# Patient Record
Sex: FEMALE | Race: WHITE | NOT HISPANIC OR LATINO | Employment: OTHER | URBAN - METROPOLITAN AREA
[De-identification: names, ages, dates, MRNs, and addresses within clinical notes are randomized per-mention and may not be internally consistent; named-entity substitution may affect disease eponyms.]

---

## 2017-02-22 ENCOUNTER — TELEPHONE (OUTPATIENT)
Dept: FAMILY MEDICINE | Facility: CLINIC | Age: 57
End: 2017-02-22

## 2017-02-22 NOTE — TELEPHONE ENCOUNTER
S-(situation): Temp at 101. Taking ibuprofen. Ears hurt, Head stuffed, achy all over. Sx started yesterday. Somewhat of a sore throat. Main sx is hacking cough. No shortness of breath or chest pain.     B-(background): She had to fly yesterday , had cold sx before and then felt worse after trip. Did have flu shot this year.   No chronic lung or immune system problems  Has not travelled outside the country in the last 3 weeks.   No one else in her household sick.     A-(assessment): likely viral and self limiting , possible flu    R-(recommendations): Option given to pt of UC this evening If this is flu starting Tamiflu early may be of help. However home treatment not unreasonable. Rest, fluids, tea with honey, tylenol or ibuprofen, guiafenesin.    If symptoms worsen or change to call back. If after hours to UC/ER.  Emperatriz Fernandez RN

## 2017-03-03 ENCOUNTER — TELEPHONE (OUTPATIENT)
Dept: FAMILY MEDICINE | Facility: CLINIC | Age: 57
End: 2017-03-03

## 2017-03-03 NOTE — TELEPHONE ENCOUNTER
Unavailable to leave voicemail, its full. Will send [employer] message to complete the Questionaire. Pt is active as stated on chart. Thanks.    Maria D Clay MA

## 2017-03-23 ENCOUNTER — OFFICE VISIT (OUTPATIENT)
Dept: FAMILY MEDICINE | Facility: CLINIC | Age: 57
End: 2017-03-23
Payer: COMMERCIAL

## 2017-03-23 VITALS
DIASTOLIC BLOOD PRESSURE: 72 MMHG | HEART RATE: 79 BPM | TEMPERATURE: 98.8 F | WEIGHT: 192 LBS | SYSTOLIC BLOOD PRESSURE: 114 MMHG | BODY MASS INDEX: 32.78 KG/M2 | OXYGEN SATURATION: 99 % | HEIGHT: 64 IN

## 2017-03-23 DIAGNOSIS — R10.13 ABDOMINAL PAIN, EPIGASTRIC: Primary | ICD-10-CM

## 2017-03-23 DIAGNOSIS — R00.2 PALPITATIONS: ICD-10-CM

## 2017-03-23 PROCEDURE — 99214 OFFICE O/P EST MOD 30 MIN: CPT | Performed by: FAMILY MEDICINE

## 2017-03-23 PROCEDURE — 93000 ELECTROCARDIOGRAM COMPLETE: CPT | Performed by: FAMILY MEDICINE

## 2017-03-23 NOTE — NURSING NOTE
"Chief Complaint   Patient presents with     Abdominal Pain     upset stomach       Initial /72 (BP Location: Left arm, Patient Position: Chair, Cuff Size: Adult Large)  Pulse 79  Temp 98.8  F (37.1  C) (Oral)  Ht 5' 4\" (1.626 m)  Wt 192 lb (87.1 kg)  SpO2 99%  Breastfeeding? No  BMI 32.96 kg/m2 Estimated body mass index is 32.96 kg/(m^2) as calculated from the following:    Height as of this encounter: 5' 4\" (1.626 m).    Weight as of this encounter: 192 lb (87.1 kg).  Medication Reconciliation: complete     Maria D Clay MA      "

## 2017-03-23 NOTE — PATIENT INSTRUCTIONS
Try to cut down on coffee and other stomach triggers.      Drink more water.    Take the prescription omeprazole for a month.    If still having symptoms Follow-Up with me in a month.    If having any worsening symptoms (chest pain, shortness of breath, dizziness, palpitations, etc) let me know sooner.

## 2017-03-23 NOTE — MR AVS SNAPSHOT
After Visit Summary   3/23/2017    Gisela Pak    MRN: [id number]           Patient Information     Date Of Birth          1960        Visit Information        Provider Department      3/23/2017 3:40 PM Mel Beavers MD [facility]        Today's Diagnoses     Palpitations    -  1    Abdominal pain, epigastric          Care Instructions    Try to cut down on coffee and other stomach triggers.      Drink more water.    Take the prescription omeprazole for a month.    If still having symptoms Follow-Up with me in a month.    If having any worsening symptoms (chest pain, shortness of breath, dizziness, palpitations, etc) let me know sooner.        Follow-ups after your visit        Who to contact     If you have questions or need follow up information about today's clinic visit or your schedule please contact [facility] [place]note 2[facility] directly at [phone].  Normal or non-critical lab and imaging results will be communicated to you by MyChart, letter or phone within 4 business days after the clinic has received the results. If you do not hear from us within 7 days, please contact the clinic through [employer] or phone. If you have a critical or abnormal lab result, we will notify you by phone as soon as possible.  Submit refill requests through [employer] or call your pharmacy and they will forward the refill request to us. Please allow 3 business days for your refill to be completed.          Additional Information About Your Visit        MyChart Information     [employer] gives you secure access to your electronic health record. If you see a primary care provider, you can also send messages to your care team and make appointments. If you have questions, please call your primary care clinic.  If you do not have a primary care provider, please call [phone] and they will assist you.        Care EveryWhere ID     This is your Care EveryWhere ID. This could be used by other  "organizations to access your [facility] medical records  [id number]        Your Vitals Were     Pulse Temperature Height Pulse Oximetry Breastfeeding? BMI (Body Mass Index)    79 98.8  F (37.1  C) (Oral) 5' 4\" (1.626 m) 99% No 32.96 kg/m2       Blood Pressure from Last 3 Encounters:   03/23/17 114/72   11/23/16 112/76   07/07/16 131/74    Weight from Last 3 Encounters:   03/23/17 192 lb (87.1 kg)   07/07/16 180 lb (81.6 kg)   06/29/16 187 lb (84.8 kg)              We Performed the Following     EKG 12-lead complete w/read - Clinics          Today's Medication Changes          These changes are accurate as of: 3/23/17  5:15 PM.  If you have any questions, ask your nurse or doctor.               Start taking these medicines.        Dose/Directions    omeprazole 20 MG CR capsule   Commonly known as:  priLOSEC   Used for:  Abdominal pain, epigastric   Started by:  Mel Beavers MD        Dose:  20 mg   Take 1 capsule (20 mg) by mouth daily   Quantity:  30 capsule   Refills:  0            Where to get your medicines      These medications were sent to [facility] [place] [id number]  [address] [facility] [address]     Phone:  [phone]     omeprazole 20 MG CR capsule                Primary Care Provider Office Phone # Fax #    Mel Beavers [phone]       [place] [id number]  [facility] [address]        Thank you!     Thank you for choosing [facility]  for your care. Our goal is always to provide you with excellent care. Hearing back from our patients is one way we can continue to improve our services. Please take a few minutes to complete the written survey that you may receive in the mail after your visit with us. Thank you!             Your Updated Medication List - Protect others around you: Learn how to safely use, store and throw away your medicines at www.disposemymeds.org.          This list is accurate as " of: 3/23/17  5:15 PM.  Always use your most recent med list.                   Brand Name Dispense Instructions for use    Butalbital-Acetaminophen  MG Tabs per tablet    PHRENILIN    30 tablet    Take 1-2 tablets by mouth every 6 hours as needed for headaches       clonazePAM 0.5 MG tablet    klonoPIN    30 tablet    Take 0.5-1 tablets (0.25-0.5 mg) by mouth nightly as needed for anxiety       OMEGA-3 CF 1000 MG Caps      1 tab daily       omeprazole 20 MG CR capsule    priLOSEC    30 capsule    Take 1 capsule (20 mg) by mouth daily       VITAMIN D PO      Reported on 3/23/2017

## 2017-03-23 NOTE — PROGRESS NOTES
"  SUBJECTIVE:                                                    Gisela Pak is a 57 year old female who presents to clinic today for the following health issues:      Upset stomach      Duration: 1 month    Description (location/character/radiation): right in the middle of the stomach, radiates to jaw    Intensity:  moderate    Accompanying signs and symptoms: Radiate to jaw    History (similar episodes/previous evaluation): Years and years ago with stomach 30 years ago    Precipitating or alleviating factors: eating    Therapies tried and outcome: omeprazole one in the am and pm,   and pepto pink pill     Outcome: not helping.     Onset while out east visiting daughter in [place].  \"burning\" sensation in chest  Belching  Nausea  No acid brash  Left jaw pain  Worse after eating  Pepto helped after a while  Some RUQ pain, radiating to back  Took omeprazole 10 BID x 1 week - not much relief  Worse after coffee - drinks 3 cups of coffee/day  Overeating this winter and gaining weight.    Some fluttering in heart - lasts seconds.  Some dizziness x 3 months - timing not associated with palpitations.  Mild cough - recent URI.      Problem list and histories reviewed & adjusted, as indicated.  Additional history: as documented    Patient Active Problem List   Diagnosis     Disturbance of skin sensation     Tachycardia     Insomnia     Mixed urge and stress incontinence     Generalized anxiety disorder     HYPERLIPIDEMIA LDL GOAL <160     Classical migraine     Varicose veins of legs     Ganglion of tendon sheath     Lumbago     Hip pain, left     Past Surgical History:   Procedure Laterality Date     COLONOSCOPY  2/5/02    ZAK, Dr. Solomon     COLONOSCOPY  7/9/12    WNL, Dr. Sharma, rpt 10 yrs.     UPPER GI ENDOSCOPY  3/28/02    HH, gastritis, reflux esophagitis, neg bx, Dr. Solomon       Social History   Substance Use Topics     Smoking status: Never Smoker     Smokeless tobacco: Never Used      Comment: a little in " "college     Alcohol use No      Comment: very rare     Family History   Problem Relation Age of Onset     CEREBROVASCULAR DISEASE Maternal Grandmother      Colon Cancer Maternal Grandmother      CEREBROVASCULAR DISEASE Maternal Grandfather      VAMSHI. Paternal Grandfather      VAMSHI. Paternal Grandmother      Breast Cancer Paternal Grandmother      CEREBROVASCULAR DISEASE Mother      Eye Disorder Father      \"going blind\"     Coronary Artery Disease Father      CAB at 78     Breast Cancer Paternal Aunt      Breast Cancer Maternal Aunt      x3 aunts     Colon Cancer Maternal Uncle          BP Readings from Last 3 Encounters:   03/23/17 114/72   11/23/16 112/76   07/07/16 131/74    Wt Readings from Last 3 Encounters:   03/23/17 192 lb (87.1 kg)   07/07/16 180 lb (81.6 kg)   06/29/16 187 lb (84.8 kg)          Reviewed and updated as needed this visit by clinical staff  Tobacco  Allergies  Meds  Problems  Med Hx  Surg Hx  Fam Hx  Soc Hx        Reviewed and updated as needed this visit by Provider  Allergies  Meds  Problems         ROS:  RESP: POSITIVE for some shortness of breath while recently resuming exercise  GI: NEGATIVE for vomiting, POSITIVE for constipation    OBJECTIVE:                                                    /72 (BP Location: Left arm, Patient Position: Chair, Cuff Size: Adult Large)  Pulse 79  Temp 98.8  F (37.1  C) (Oral)  Ht 5' 4\" (1.626 m)  Wt 192 lb (87.1 kg)  SpO2 99%  Breastfeeding? No  BMI 32.96 kg/m2  Body mass index is 32.96 kg/(m^2).  GEN:  no apparent distress  EYES: PERRL, conjunctivae and sclerae clear  NECK:  Supple without adenopathy, mass, or thyromegaly  LUNGS:  normal respiratory effort, and lungs clear to auscultation bilaterally - no rales, rhonchi or wheezes  CV: regular rate and rhythm, normal S1 S2, no S3 or S4 and no murmur, click or rub  ABD:  soft, mild epigastric tenderness, no mass, no hepatosplenomegaly, no hernias   SKIN:  normal to inspection " and palpation, no rashes or abnormal-appearing lesions     Diagnostic Test Results:  EKG: Normal Sinus Rhythm, normal axis, no acute ST/T changes c/w ischemia, no LVH by voltage criteria, Right Bundle Branch Block, unchanged from previous tracing by my interpretation      ASSESSMENT/PLAN:                                                        1. Abdominal pain, epigastric  Most likely GI etiology given constellation of symptoms and history of HH/esophagitis.  We'll treat empirically with omeprazole for 1 month.  I also discussed lifestyle changes to minimize dyspepsia/GERD.  Discussed that if symptoms persist further diagnostic workup may include EGD or possible RUQ ultrasound.    - omeprazole (PRILOSEC) 20 MG CR capsule; Take 1 capsule (20 mg) by mouth daily  Dispense: 30 capsule; Refill: 0    2. Palpitations  Also left jaw pain.  EKG is unchanged.  Consider further workup.    - EKG 12-lead complete w/read - Clinics     FUTURE APPOINTMENTS:       - Follow-up visit in 1 month      Patient Instructions   Try to cut down on coffee and other stomach triggers.      Drink more water.    Take the prescription omeprazole for a month.    If still having symptoms Follow-Up with me in a month.    If having any worsening symptoms (chest pain, shortness of breath, dizziness, palpitations, etc) let me know sooner.      Mel Beavers MD  [facility]

## 2017-05-18 DIAGNOSIS — F51.01 PRIMARY INSOMNIA: ICD-10-CM

## 2017-05-18 DIAGNOSIS — F41.1 GENERALIZED ANXIETY DISORDER: ICD-10-CM

## 2017-05-18 DIAGNOSIS — G43.109 MIGRAINE WITH AURA AND WITHOUT STATUS MIGRAINOSUS, NOT INTRACTABLE: ICD-10-CM

## 2017-05-18 RX ORDER — BUTALBITAL/ACETAMINOPHEN 50MG-325MG
TABLET ORAL
Qty: 30 TABLET | Refills: 0 | Status: CANCELLED | OUTPATIENT
Start: 2017-05-18

## 2017-05-18 NOTE — TELEPHONE ENCOUNTER
clonazePAM (KLONOPIN) 0.5 MG tablet      Last Written Prescription Date:  11/23/16  Last Fill Quantity: 30,   # refills: 0  Last Office Visit with [facility] or [facility] prescribing provider: 3/23/17  Future Office visit:       Butalbital-Acetaminophen (PHRENILIN)  MG TABS      Last Written Prescription Date:  12/14/15  Last Fill Quantity: 30,   # refills: 0  Last Office Visit with [facility] or [facility] prescribing provider: 3/23/17  Future Office visit:       Routing refill request to provider for review/approval because:  Drug not on the [facility], [facility] or [facility] refill protocol or controlled substance

## 2017-05-19 NOTE — TELEPHONE ENCOUNTER
Routing to reception pool -- please reach out to patient to schedule OV to address these refills.    Thank you  KEVIN GoncalvesN, RN  [facility]

## 2017-05-24 ENCOUNTER — TELEPHONE (OUTPATIENT)
Dept: FAMILY MEDICINE | Facility: CLINIC | Age: 57
End: 2017-05-24

## 2017-05-24 DIAGNOSIS — G43.109 MIGRAINE WITH AURA AND WITHOUT STATUS MIGRAINOSUS, NOT INTRACTABLE: ICD-10-CM

## 2017-05-24 RX ORDER — BUTALBITAL/ACETAMINOPHEN 50MG-325MG
1-2 TABLET ORAL EVERY 6 HOURS PRN
Qty: 30 TABLET | Refills: 0 | Status: SHIPPED | OUTPATIENT
Start: 2017-05-24 | End: 2017-12-01

## 2017-05-24 NOTE — TELEPHONE ENCOUNTER
The patient is upset that she is required to come in for an office visit to get a refill of Butalbital-Acetaminophen (PHRENILIN)  MG TABS and clonazePAM (KLONOPIN) 0.5 MG tablet.  The patient stated that she did have an office visit in 3/2017 and doesn't feel it appropriate that she needs to come in again.  Please follow up with the patient.

## 2017-05-24 NOTE — TELEPHONE ENCOUNTER
--Please see previous msg from patient in this encounter.  --Can I call patient and confirm that it is [facility] policy for face to face visits for controlled meds?  --I do not find plan or CSA.  --There is a 5/18/17 encounter that is still open with medication pended.    Hanna Valencia, KEVINN, RN

## 2017-05-24 NOTE — TELEPHONE ENCOUNTER
Yes, she's been informed of this before regarding clonazepam.  I will fill the butalbital as I had previously discussed with her.  Please fax to her pharmacy.  Script is in [facility].   I will do a CSA when I see her.   Mel Beavers MD.      11/10/2016 refill encounter for clonazepam:  Conversation: Refill Request   (Newest Message First)   Maria Antonia De La Rosa, EBEN        11/10/16 4:55 PM   Note      Patient called back and was informed of need for office visit per clinic policy regarding controlled substances.     Patient verbalized understanding and stated she will think about an office visit as she uses this medication rarely for migraines.     LAURA Nation, RN                11/23/2016 office visit for clonazepam:    ASSESSMENT/PLAN:               1. Primary insomnia  2. Generalized anxiety disorder  Improved control.  Uses klonopin sparingly - no more than #30 over 6 months.  Understands that she must be seen in clinic for refills.    - clonazePAM (KLONOPIN) 0.5 MG tablet; Take 0.5-1 tablets (0.25-0.5 mg) by mouth nightly as needed for anxiety  Dispense: 30 tablet; Refill: 0     3. Migraine with aura and without status migrainosus, not intractable  stable/well controlled.  Control has improved significantly since menopause.  Occasionally uses butalbital - still has about 1/3 of #30 supply she received a year ago.  Discussed that with such infrequent use I am ok with refilling prn by phone request.

## 2017-05-30 RX ORDER — CLONAZEPAM 0.5 MG/1
TABLET ORAL
Qty: 30 TABLET | Refills: 0 | OUTPATIENT
Start: 2017-05-30

## 2017-06-04 NOTE — PROGRESS NOTES
SUBJECTIVE:                                                    Gisela Pak is a 57 year old female who presents to clinic today for the following health issues:       Anxiety Follow-Up    Status since last visit: No change    Other associated symptoms:None    Complicating factors:   Significant life event: No   Current substance abuse: None  Depression symptoms: Yes-  sometimes    Uses clonazepam sparingly - #30 last about 6 months.    KIRSTIN-7 SCORE 6/29/2016 11/23/2016 6/5/2017   Total Score - - -   Total Score 15 6 8     PHQ-9 SCORE 6/29/2016 11/23/2016 6/5/2017   Total Score - - -   Total Score 13 3 7         GAD7     Burning tongue  x a year  Just the tongue - not whole mouth.  Mild-moderate - mostly just a nuisance.  Has not mentioned to dentist.  No dental issues.  No associated dry mouth.  Sister has oral lichen planus        Amount of exercise or physical activity: 2-3 days/week for an average of 30-45 minutes    Problems taking medications regularly: No    Medication side effects: none    Diet: regular (no restrictions)        Problem list and histories reviewed & adjusted, as indicated.  Additional history: as documented    Patient Active Problem List   Diagnosis     Disturbance of skin sensation     Tachycardia     Insomnia     Mixed urge and stress incontinence     Generalized anxiety disorder     HYPERLIPIDEMIA LDL GOAL <160     Classical migraine     Varicose veins of legs     Ganglion of tendon sheath     Lumbago     Hip pain, left     Past Surgical History:   Procedure Laterality Date     COLONOSCOPY  2/5/02    ZAK, Dr. Solomon     COLONOSCOPY  7/9/12    WNL, Dr. Sharma, rpt 10 yrs.     UPPER GI ENDOSCOPY  3/28/02    HH, gastritis, reflux esophagitis, neg bx, Dr. Solomon       Social History   Substance Use Topics     Smoking status: Never Smoker     Smokeless tobacco: Never Used      Comment: a little in college     Alcohol use No      Comment: very rare     Family History   Problem Relation  "Age of Onset     CEREBROVASCULAR DISEASE Maternal Grandmother      Colon Cancer Maternal Grandmother      CEREBROVASCULAR DISEASE Maternal Grandfather      C.A.D. Paternal Grandfather      C.A.D. Paternal Grandmother      Breast Cancer Paternal Grandmother      CEREBROVASCULAR DISEASE Mother      Other - See Comments Mother 80     churg adia syndrome     Eye Disorder Father      \"going blind\"     Coronary Artery Disease Father      CAB at 78     Breast Cancer Paternal Aunt      Breast Cancer Maternal Aunt      x3 aunts     Colon Cancer Maternal Uncle          Current Outpatient Prescriptions   Medication Sig Dispense Refill     clonazePAM (KLONOPIN) 0.5 MG tablet Take 0.5-1 tablets (0.25-0.5 mg) by mouth nightly as needed for anxiety 30 tablet 0     Butalbital-Acetaminophen (PHRENILIN)  MG TABS per tablet Take 1-2 tablets by mouth every 6 hours as needed for headaches 30 tablet 0     omeprazole (PRILOSEC) 20 MG CR capsule Take 1 capsule (20 mg) by mouth daily 30 capsule 0     VITAMIN D OR Reported on 3/23/2017       OMEGA-3 CF 1000 MG OR CAPS 1 tab daily  0     [DISCONTINUED] clonazePAM (KLONOPIN) 0.5 MG tablet Take 0.5-1 tablets (0.25-0.5 mg) by mouth nightly as needed for anxiety 30 tablet 0     Allergies   Allergen Reactions     Lexapro [Escitalopram] Palpitations and Nausea     Recent Labs   Lab Test  07/07/16   0903  06/06/16   0811   03/05/12   0838  01/17/11   0953  07/30/09   0944   LDL   --   125*   --   167*  159*  133*   HDL   --   78   --   70  64  52   TRIG   --   96   --   78  117  167*   ALT   --   32   --    --    --   21   CR  0.75  0.82   < >  0.87  0.89  0.82   GFRESTIMATED  81  72   < >  69  67  74   GFRESTBLACK  >90   GFR Calc    87   < >  83  82  >90   POTASSIUM  3.9  3.9   < >  4.3  4.3  4.6    < > = values in this interval not displayed.      BP Readings from Last 3 Encounters:   06/05/17 (!) 130/25   03/23/17 114/72   11/23/16 112/76    Wt Readings from Last 3 " "Encounters:   06/05/17 194 lb 12 oz (88.3 kg)   03/23/17 192 lb (87.1 kg)   07/07/16 180 lb (81.6 kg)         Reviewed and updated as needed this visit by clinical staff  Tobacco  Allergies  Med Hx  Surg Hx  Fam Hx  Soc Hx        ROS:  NEURO: POSITIVE for numbness/tingling of hands and feet  PSYCH: POSITIVE for irritability    OBJECTIVE:                                                    BP (!) 130/25 (BP Location: Left arm, Patient Position: Chair, Cuff Size: Adult Large)  Pulse 87  Temp 98.7  F (37.1  C) (Oral)  Resp 16  Ht 5' 4\" (1.626 m)  Wt 194 lb 12 oz (88.3 kg)  SpO2 98%  Breastfeeding? No  BMI 33.43 kg/m2  Body mass index is 33.43 kg/(m^2).  GEN:  no apparent distress  ENT: oral cavity appears normal with no lesions, tongue appears normal, moist mucus membranes and pharynx with normal landmarks  PSYCH:    Appearance: appropriately and casually dressed    Attitude: cooperative    Speech:  normal rate, rhythm, and tone    Thought Form: organized    Thought Content: no evidence of psychotic thought    Mood: anxious    Affect: mood congruent    Insight: good        ASSESSMENT/PLAN:                                                        1. Primary insomnia  2. Generalized anxiety disorder  Reviewed that clonazepam is a benzo which is a controlled substance.  Reviewed clinic's refill policy regarding controlled substances - must be refilled in person at face-to-face visits.  We did do a formal controlled substance agreement today.  Discussed that I am not concerned about her minimal use of this.  I do have concerns about uncontrolled depression and she is considering a trial of antidepressant.    - clonazePAM (KLONOPIN) 0.5 MG tablet; Take 0.5-1 tablets (0.25-0.5 mg) by mouth nightly as needed for anxiety  Dispense: 30 tablet; Refill: 0    3. Burning tongue  Will start workup (which also includes workup for peripheral neuropathy).  Recommended she see ENT if labs all normal and symptoms persist.    - " Vitamin B12  - Iron and iron binding capacity  - Folate  - Zinc  - Vitamin B6  - TSH with free T4 reflex  - Basic metabolic panel  - OTOLARYNGOLOGY REFERRAL       Mel Beavers MD  [facility]

## 2017-06-05 ENCOUNTER — OFFICE VISIT (OUTPATIENT)
Dept: FAMILY MEDICINE | Facility: CLINIC | Age: 57
End: 2017-06-05
Payer: COMMERCIAL

## 2017-06-05 VITALS
TEMPERATURE: 98.7 F | RESPIRATION RATE: 16 BRPM | WEIGHT: 194.75 LBS | DIASTOLIC BLOOD PRESSURE: 25 MMHG | OXYGEN SATURATION: 98 % | SYSTOLIC BLOOD PRESSURE: 130 MMHG | BODY MASS INDEX: 33.25 KG/M2 | HEART RATE: 87 BPM | HEIGHT: 64 IN

## 2017-06-05 DIAGNOSIS — K14.6 BURNING TONGUE: ICD-10-CM

## 2017-06-05 DIAGNOSIS — F41.1 GENERALIZED ANXIETY DISORDER: ICD-10-CM

## 2017-06-05 DIAGNOSIS — F51.01 PRIMARY INSOMNIA: Primary | ICD-10-CM

## 2017-06-05 PROCEDURE — 84207 ASSAY OF VITAMIN B-6: CPT | Mod: 90 | Performed by: FAMILY MEDICINE

## 2017-06-05 PROCEDURE — 84443 ASSAY THYROID STIM HORMONE: CPT | Performed by: FAMILY MEDICINE

## 2017-06-05 PROCEDURE — 82746 ASSAY OF FOLIC ACID SERUM: CPT | Performed by: FAMILY MEDICINE

## 2017-06-05 PROCEDURE — 80048 BASIC METABOLIC PNL TOTAL CA: CPT | Performed by: FAMILY MEDICINE

## 2017-06-05 PROCEDURE — 83540 ASSAY OF IRON: CPT | Performed by: FAMILY MEDICINE

## 2017-06-05 PROCEDURE — 84630 ASSAY OF ZINC: CPT | Mod: 90 | Performed by: FAMILY MEDICINE

## 2017-06-05 PROCEDURE — 99000 SPECIMEN HANDLING OFFICE-LAB: CPT | Performed by: FAMILY MEDICINE

## 2017-06-05 PROCEDURE — 82607 VITAMIN B-12: CPT | Performed by: FAMILY MEDICINE

## 2017-06-05 PROCEDURE — 36415 COLL VENOUS BLD VENIPUNCTURE: CPT | Performed by: FAMILY MEDICINE

## 2017-06-05 PROCEDURE — 99214 OFFICE O/P EST MOD 30 MIN: CPT | Performed by: FAMILY MEDICINE

## 2017-06-05 PROCEDURE — 83550 IRON BINDING TEST: CPT | Performed by: FAMILY MEDICINE

## 2017-06-05 RX ORDER — CLONAZEPAM 0.5 MG/1
0.25-0.5 TABLET ORAL
Qty: 30 TABLET | Refills: 0 | Status: SHIPPED | OUTPATIENT
Start: 2017-06-05 | End: 2018-01-15

## 2017-06-05 ASSESSMENT — ANXIETY QUESTIONNAIRES
GAD7 TOTAL SCORE: 8
6. BECOMING EASILY ANNOYED OR IRRITABLE: MORE THAN HALF THE DAYS
2. NOT BEING ABLE TO STOP OR CONTROL WORRYING: SEVERAL DAYS
7. FEELING AFRAID AS IF SOMETHING AWFUL MIGHT HAPPEN: MORE THAN HALF THE DAYS
5. BEING SO RESTLESS THAT IT IS HARD TO SIT STILL: NOT AT ALL
3. WORRYING TOO MUCH ABOUT DIFFERENT THINGS: SEVERAL DAYS
1. FEELING NERVOUS, ANXIOUS, OR ON EDGE: SEVERAL DAYS

## 2017-06-05 ASSESSMENT — PATIENT HEALTH QUESTIONNAIRE - PHQ9: 5. POOR APPETITE OR OVEREATING: SEVERAL DAYS

## 2017-06-05 NOTE — MR AVS SNAPSHOT
After Visit Summary   6/5/2017    Gisela Pak    MRN: [id number]           Patient Information     Date Of Birth          1960        Visit Information        Provider Department      6/5/2017 5:20 PM Mel Beavers MD [facility]        Today's Diagnoses     Burning tongue    -  1    Primary insomnia        Generalized anxiety disorder           Follow-ups after your visit        Additional Services     OTOLARYNGOLOGY REFERRAL       Your provider has referred you to:   [facility]: [facility] ([facility] - [place] [phone]    [facility]: [facility] [phone]       Please be aware that coverage of these services is subject to the terms and limitations of your health insurance plan.  Call member services at your health plan with any benefit or coverage questions.      Please bring the following with you to your appointment:    (1) Any X-Rays, CTs or MRIs which have been performed.  Contact the facility where they were done to arrange for  prior to your scheduled appointment.   (2) List of current medications  (3) This referral request   (4) Any documents/labs given to you for this referral                  Who to contact     If you have questions or need follow up information about today's clinic visit or your schedule please contact [facility] directly at [phone].  Normal or non-critical lab and imaging results will be communicated to you by MyChart, letter or phone within 4 business days after the clinic has received the results. If you do not hear from us within 7 days, please contact the clinic through MyChart or phone. If you have a critical or abnormal lab result, we will notify you by phone as soon as possible.  Submit refill requests through Dealentra or call your pharmacy and they will forward the refill request to us. Please allow 3 business days for your refill to  "be completed.          Additional Information About Your Visit        ESTmobhart Information     NileGuide gives you secure access to your electronic health record. If you see a primary care provider, you can also send messages to your care team and make appointments. If you have questions, please call your primary care clinic.  If you do not have a primary care provider, please call [phone] and they will assist you.        Care EveryWhere ID     This is your Care EveryWhere ID. This could be used by other organizations to access your [place] medical records  [id number]        Your Vitals Were     Pulse Temperature Respirations Height Pulse Oximetry Breastfeeding?    87 98.7  F (37.1  C) (Oral) 16 5' 4\" (1.626 m) 98% No    BMI (Body Mass Index)                   33.43 kg/m2            Blood Pressure from Last 3 Encounters:   06/05/17 (!) 130/25   03/23/17 114/72   11/23/16 112/76    Weight from Last 3 Encounters:   06/05/17 194 lb 12 oz (88.3 kg)   03/23/17 192 lb (87.1 kg)   07/07/16 180 lb (81.6 kg)              We Performed the Following     Basic metabolic panel     Folate     Iron and iron binding capacity     OTOLARYNGOLOGY REFERRAL     TSH with free T4 reflex     Vitamin B12     Vitamin B6     Zinc          Where to get your medicines      Some of these will need a paper prescription and others can be bought over the counter.  Ask your nurse if you have questions.     Bring a paper prescription for each of these medications     clonazePAM 0.5 MG tablet          Primary Care Provider Office Phone # Fax #    Mel Beavers [phone]       [facility] [address] [facility] [address]        Thank you!     Thank you for choosing [facility]  for your care. Our goal is always to provide you with excellent care. Hearing back from our patients is one way we can continue to improve our services. Please take a few minutes to complete the written survey that you " may receive in the mail after your visit with us. Thank you!             Your Updated Medication List - Protect others around you: Learn how to safely use, store and throw away your medicines at www.disposemymeds.org.          This list is accurate as of: 6/5/17  6:12 PM.  Always use your most recent med list.                   Brand Name Dispense Instructions for use    Butalbital-Acetaminophen  MG Tabs per tablet    PHRENILIN    30 tablet    Take 1-2 tablets by mouth every 6 hours as needed for headaches       clonazePAM 0.5 MG tablet    klonoPIN    30 tablet    Take 0.5-1 tablets (0.25-0.5 mg) by mouth nightly as needed for anxiety       OMEGA-3 CF 1000 MG Caps      1 tab daily       omeprazole 20 MG CR capsule    priLOSEC    30 capsule    Take 1 capsule (20 mg) by mouth daily       VITAMIN D PO      Reported on 3/23/2017

## 2017-06-05 NOTE — NURSING NOTE
"Chief Complaint   Patient presents with     Anxiety       Initial BP (!) 130/25 (BP Location: Left arm, Patient Position: Chair, Cuff Size: Adult Large)  Pulse 87  Temp 98.7  F (37.1  C) (Oral)  Resp 16  Ht 5' 4\" (1.626 m)  Wt 194 lb 12 oz (88.3 kg)  SpO2 98%  Breastfeeding? No  BMI 33.43 kg/m2 Estimated body mass index is 33.43 kg/(m^2) as calculated from the following:    Height as of this encounter: 5' 4\" (1.626 m).    Weight as of this encounter: 194 lb 12 oz (88.3 kg).  Medication Reconciliation: complete     Maria D Clay MA    "

## 2017-06-06 LAB
ANION GAP SERPL CALCULATED.3IONS-SCNC: 12 MMOL/L (ref 3–14)
BUN SERPL-MCNC: 22 MG/DL (ref 7–30)
CALCIUM SERPL-MCNC: 8.7 MG/DL (ref 8.5–10.1)
CHLORIDE SERPL-SCNC: 109 MMOL/L (ref 94–109)
CO2 SERPL-SCNC: 20 MMOL/L (ref 20–32)
CREAT SERPL-MCNC: 0.73 MG/DL (ref 0.52–1.04)
FOLATE SERPL-MCNC: 13.2 NG/ML
GFR SERPL CREATININE-BSD FRML MDRD: 83 ML/MIN/1.7M2
GLUCOSE SERPL-MCNC: 93 MG/DL (ref 70–99)
IRON SATN MFR SERPL: 18 % (ref 15–46)
IRON SERPL-MCNC: 50 UG/DL (ref 35–180)
POTASSIUM SERPL-SCNC: 4 MMOL/L (ref 3.4–5.3)
SODIUM SERPL-SCNC: 141 MMOL/L (ref 133–144)
TIBC SERPL-MCNC: 273 UG/DL (ref 240–430)
TSH SERPL DL<=0.005 MIU/L-ACNC: 1.18 MU/L (ref 0.4–4)
VIT B12 SERPL-MCNC: 445 PG/ML (ref 193–986)

## 2017-06-06 ASSESSMENT — PATIENT HEALTH QUESTIONNAIRE - PHQ9: SUM OF ALL RESPONSES TO PHQ QUESTIONS 1-9: 7

## 2017-06-06 ASSESSMENT — ANXIETY QUESTIONNAIRES: GAD7 TOTAL SCORE: 8

## 2017-06-08 LAB
VIT B6 SERPL-MCNC: 49.3 NG/ML
ZINC SERPL-MCNC: 75 UG/ML

## 2017-06-09 NOTE — PROGRESS NOTES
Gray Higgins,  This all looks normal.  If you continue to have burning tongue symptoms I think you should schedule an appointment with ENT as we discussed.    I also agree that you should consider trying another daily anti-depressant/anti-anxiety med to see if that helps with anxiety/sleep. Please schedule an appointment with me whenever you'd like to discuss those options further.    Mel Beavers MD

## 2017-08-16 ENCOUNTER — OFFICE VISIT (OUTPATIENT)
Dept: URGENT CARE | Facility: URGENT CARE | Age: 57
End: 2017-08-16
Payer: COMMERCIAL

## 2017-08-16 VITALS
HEIGHT: 64 IN | SYSTOLIC BLOOD PRESSURE: 118 MMHG | OXYGEN SATURATION: 97 % | WEIGHT: 190 LBS | DIASTOLIC BLOOD PRESSURE: 80 MMHG | TEMPERATURE: 97.9 F | BODY MASS INDEX: 32.44 KG/M2 | HEART RATE: 71 BPM

## 2017-08-16 DIAGNOSIS — R30.0 DYSURIA: Primary | ICD-10-CM

## 2017-08-16 LAB
ALBUMIN UR-MCNC: NEGATIVE MG/DL
APPEARANCE UR: CLEAR
BILIRUB UR QL STRIP: NEGATIVE
COLOR UR AUTO: YELLOW
GLUCOSE UR STRIP-MCNC: NEGATIVE MG/DL
HGB UR QL STRIP: ABNORMAL
KETONES UR STRIP-MCNC: NEGATIVE MG/DL
LEUKOCYTE ESTERASE UR QL STRIP: NEGATIVE
NITRATE UR QL: NEGATIVE
PH UR STRIP: 5.5 PH (ref 5–7)
RBC #/AREA URNS AUTO: NORMAL /HPF
SOURCE: ABNORMAL
SP GR UR STRIP: 1.02 (ref 1–1.03)
SPECIMEN SOURCE: NORMAL
UROBILINOGEN UR STRIP-ACNC: 0.2 EU/DL (ref 0.2–1)
WBC #/AREA URNS AUTO: NORMAL /HPF
WET PREP SPEC: NORMAL

## 2017-08-16 PROCEDURE — 81001 URINALYSIS AUTO W/SCOPE: CPT | Performed by: PHYSICIAN ASSISTANT

## 2017-08-16 PROCEDURE — 87210 SMEAR WET MOUNT SALINE/INK: CPT | Performed by: PHYSICIAN ASSISTANT

## 2017-08-16 PROCEDURE — 99213 OFFICE O/P EST LOW 20 MIN: CPT | Performed by: PHYSICIAN ASSISTANT

## 2017-08-16 NOTE — MR AVS SNAPSHOT
"              After Visit Summary   8/16/2017    Gisela Pak    MRN: [id number]           Patient Information     Date Of Birth          1960        Visit Information        Provider Department      8/16/2017 8:35 PM Maria Luisa Gatica PA-C [facility]        Today's Diagnoses     Dysuria    -  1       Follow-ups after your visit        Who to contact     If you have questions or need follow up information about today's clinic visit or your schedule please contact [facility] directly at [phone].  Normal or non-critical lab and imaging results will be communicated to you by [place] PUDhart, letter or phone within 4 business days after the clinic has received the results. If you do not hear from us within 7 days, please contact the clinic through Pixleet or phone. If you have a critical or abnormal lab result, we will notify you by phone as soon as possible.  Submit refill requests through [employer] or call your pharmacy and they will forward the refill request to us. Please allow 3 business days for your refill to be completed.          Additional Information About Your Visit        MyChart Information     C[employer] gives you secure access to your electronic health record. If you see a primary care provider, you can also send messages to your care team and make appointments. If you have questions, please call your primary care clinic.  If you do not have a primary care provider, please call [phone] and they will assist you.        Care EveryWhere ID     This is your Care EveryWhere ID. This could be used by other organizations to access your [place] medical records  [id number]        Your Vitals Were     Pulse Temperature Height Last Period Pulse Oximetry BMI (Body Mass Index)    71 97.9  F (36.6  C) (Oral) 5' 4\" (1.626 m) 09/14/2011 97% 32.61 kg/m2       Blood Pressure from Last 3 Encounters:   08/16/17 118/80   06/05/17 (!) 130/25   03/23/17 114/72    " Weight from Last 3 Encounters:   08/16/17 190 lb (86.2 kg)   06/05/17 194 lb 12 oz (88.3 kg)   03/23/17 192 lb (87.1 kg)              We Performed the Following     *UA reflex to Microscopic and Culture ([facility] (except [place] and [place])     Urine Microscopic     Wet prep        Primary Care Provider Office Phone # Fax #    Mel Beavers [phone]       [address]  [facility] [address]        Equal Access to Services     [facility] CenterISABEL : Hadii aad ku hadasho Soomaali, waaxda luqadaha, qaybta kaalmada adeegyada, waxmj walter haykiara duran . So [facility] [phone].    ATENCIÓN: Si habla español, tiene a craig disposición servicios gratuitos de asistencia lingüística. [facility] [phone].    We comply with applicable federal civil rights laws and [place] laws. We do not discriminate on the basis of race, color, national origin, age, disability sex, sexual orientation or gender identity.            Thank you!     Thank you for choosing [facility]  for your care. Our goal is always to provide you with excellent care. Hearing back from our patients is one way we can continue to improve our services. Please take a few minutes to complete the written survey that you may receive in the mail after your visit with us. Thank you!             Your Updated Medication List - Protect others around you: Learn how to safely use, store and throw away your medicines at www.disposemymeds.org.          This list is accurate as of: 8/16/17 11:24 PM.  Always use your most recent med list.                   Brand Name Dispense Instructions for use Diagnosis    Butalbital-Acetaminophen  MG Tabs per tablet    PHRENILIN    30 tablet    Take 1-2 tablets by mouth every 6 hours as needed for headaches    Migraine with aura and without status migrainosus, not intractable       clonazePAM 0.5 MG tablet    klonoPIN    30 tablet    Take 0.5-1 tablets  (0.25-0.5 mg) by mouth nightly as needed for anxiety    Primary insomnia, Generalized anxiety disorder       OMEGA-3 CF 1000 MG Caps      1 tab daily        omeprazole 20 MG CR capsule    priLOSEC    30 capsule    Take 1 capsule (20 mg) by mouth daily    Abdominal pain, epigastric       VITAMIN D PO      Reported on 3/23/2017    Well woman exam with routine gynecological exam

## 2017-08-17 NOTE — PROGRESS NOTES
HPI:  Gisela is a 58 yo female who presents for dysuria, frequency, and suprapubic pressure and discomfort x 7-10 days.  Denies vaginal d/c but does report some vaginal itching.  Denies blood in urine, fever, or flank pain.    Reports has not had a PAP for approx. 3 years.     ROS:  See HPI    PE:  Vitals & nursing notes reviewed.  B/P: 118/80, T: 97.9, P: 71, R: Data Unavailable  Constitutional:  Alert, well nourished, well-developed, NAD  Lungs:  CTA, no wheezes, rhonchi, or rales  CV:  RRR,  no murmur appreciated  Abdomen:  Soft, NTTP, No HSM, No CVA tenderness, (+) bowel sounds,    Labs:  Wet prep:  Negative  UA:  Trace blood  UA Micro:  WBC: 0-2,  RBC 0-2    ASSESSMENT:  (R30.0) Dysuria  (primary encounter diagnosis)  Comment: Etiology unclear.  Wet prep negative. UA micro unremarkable.  Plan:  FU with PCP or OBGYN.

## 2017-08-17 NOTE — NURSING NOTE
"Chief Complaint   Patient presents with     Urgent Care     Pt in clinic to have eval for dysuria.     Dysuria       Initial /80  Pulse 71  Temp 97.9  F (36.6  C) (Oral)  Ht 5' 4\" (1.626 m)  Wt 190 lb (86.2 kg)  LMP 09/14/2011  SpO2 97%  BMI 32.61 kg/m2 Estimated body mass index is 32.61 kg/(m^2) as calculated from the following:    Height as of this encounter: 5' 4\" (1.626 m).    Weight as of this encounter: 190 lb (86.2 kg).  Medication Reconciliation: complete   Starr Cruz/ MA    "

## 2017-11-29 ENCOUNTER — OFFICE VISIT (OUTPATIENT)
Dept: FAMILY MEDICINE | Facility: CLINIC | Age: 57
End: 2017-11-29
Payer: COMMERCIAL

## 2017-11-29 VITALS
WEIGHT: 186 LBS | BODY MASS INDEX: 31.93 KG/M2 | OXYGEN SATURATION: 99 % | RESPIRATION RATE: 16 BRPM | TEMPERATURE: 98.2 F | SYSTOLIC BLOOD PRESSURE: 122 MMHG | HEART RATE: 72 BPM | DIASTOLIC BLOOD PRESSURE: 82 MMHG

## 2017-11-29 DIAGNOSIS — S23.41XA SPRAIN OF COSTAL CARTILAGE, INITIAL ENCOUNTER: ICD-10-CM

## 2017-11-29 DIAGNOSIS — J20.9 ACUTE BRONCHITIS, UNSPECIFIED ORGANISM: Primary | ICD-10-CM

## 2017-11-29 PROCEDURE — 99214 OFFICE O/P EST MOD 30 MIN: CPT | Performed by: PHYSICIAN ASSISTANT

## 2017-11-29 RX ORDER — BENZONATATE 100 MG/1
100 CAPSULE ORAL 3 TIMES DAILY PRN
Qty: 16 CAPSULE | Refills: 0 | Status: SHIPPED | OUTPATIENT
Start: 2017-11-29 | End: 2018-01-15

## 2017-11-29 ASSESSMENT — ENCOUNTER SYMPTOMS
CHILLS: 0
FOCAL WEAKNESS: 0
HEADACHES: 0
FEVER: 0
VOMITING: 0
SHORTNESS OF BREATH: 0
MYALGIAS: 1
ABDOMINAL PAIN: 0
NAUSEA: 0
DIARRHEA: 0
COUGH: 1

## 2017-11-29 NOTE — PROGRESS NOTES
HPI    SUBJECTIVE:   Gisela Pak is a 57 year old female who presents to clinic today for the following health issues:    Pt comes in clinic today with pain under the left breast x 2.5 weeks. She states that the pain is at a 5/10 at its worst. She states she has had a cough for 1 month and feels like she could have strained a muscle from all of the coughing. Notes malaise, fatigue, congestion, & cough for first 2 weeks of illness; now just the cough which is improving. Pain is constant but worse with coughing or movement. It is aggravated by wearing a bra. She has tried applying a rub she got from her massage therapist without improvement. Also tried Tylenol without relief, and ibuprofen helps but upsets her stomach. Denies rash, warmth, erythema, breast mass, nipple discharge, CP, SOB, hemoptysis, N/V/D, dark/bloody stool, constipation, or urinary sx.    Additional complaints:None    Chart Review:  PHQ-9 SCORE 6/29/2016 11/23/2016 6/5/2017   Total Score - - -   Total Score 13 3 7     KIRSTIN-7 SCORE 6/29/2016 11/23/2016 6/5/2017   Total Score - - -   Total Score 15 6 8     Patient Active Problem List   Diagnosis     Disturbance of skin sensation     Tachycardia     Insomnia     Mixed urge and stress incontinence     Generalized anxiety disorder     HYPERLIPIDEMIA LDL GOAL <160     Classical migraine     Varicose veins of legs     Ganglion of tendon sheath     Lumbago     Hip pain, left     Past Surgical History:   Procedure Laterality Date     COLONOSCOPY  2/5/02    ZAK, Dr. Solomon     COLONOSCOPY  7/9/12    WNL, Dr. Sharma, rpt 10 yrs.     UPPER GI ENDOSCOPY  3/28/02    HH, gastritis, reflux esophagitis, neg bx, Dr. Solomon     Family History   Problem Relation Age of Onset     CEREBROVASCULAR DISEASE Maternal Grandmother      Colon Cancer Maternal Grandmother      CEREBROVASCULAR DISEASE Maternal Grandfather      C.A.D. Paternal Grandfather      C.A.D. Paternal Grandmother      Breast Cancer Paternal  "Grandmother      CEREBROVASCULAR DISEASE Mother      Other - See Comments Mother 80     churg adia syndrome     Eye Disorder Father      \"going blind\"     Coronary Artery Disease Father      CAB at 78     Breast Cancer Paternal Aunt      Breast Cancer Maternal Aunt      x3 aunts     Colon Cancer Maternal Uncle       Social History   Substance Use Topics     Smoking status: Never Smoker     Smokeless tobacco: Never Used      Comment: a little in college     Alcohol use No      Comment: very rare        Problem list, Medication list, Allergies, Medical/Social/Surg hx reviewed in [facility], updated as appropriate.      Review of Systems   Constitutional: Negative for chills and fever.   Respiratory: Positive for cough. Negative for shortness of breath.    Cardiovascular: Negative for chest pain.   Gastrointestinal: Negative for abdominal pain, diarrhea, nausea and vomiting.   Musculoskeletal: Positive for myalgias.   Skin: Negative for rash.   Neurological: Negative for focal weakness and headaches.   All other systems reviewed and are negative.        Physical Exam   Constitutional: She is oriented to person, place, and time and well-developed, well-nourished, and in no distress.   HENT:   Head: Normocephalic and atraumatic.   Right Ear: Tympanic membrane, external ear and ear canal normal.   Left Ear: External ear and ear canal normal.   Nose: Nose normal.   Mouth/Throat: Uvula is midline, oropharynx is clear and moist and mucous membranes are normal.   Cardiovascular: Normal rate, regular rhythm and normal heart sounds.    Pulmonary/Chest: Effort normal and breath sounds normal. She exhibits bony tenderness.       Musculoskeletal: Normal range of motion.   Neurological: She is alert and oriented to person, place, and time. Gait normal.   Skin: Skin is warm and dry.   Nursing note and vitals reviewed.    Vital Signs  /82  Pulse 72  Temp 98.2  F (36.8  C) (Tympanic)  Resp 16  Wt 186 lb (84.4 kg)  LMP " 09/14/2011  SpO2 99%  BMI 31.93 kg/m2   Body mass index is 31.93 kg/(m^2).    Diagnostic Test Results:  none     ASSESSMENT/PLAN:                                                        ICD-10-CM    1. Acute bronchitis, unspecified organism J20.9 benzonatate (TESSALON) 100 MG capsule   2. Sprain of costal cartilage, initial encounter S23.41XA diclofenac (VOLTAREN) 1 % GEL topical gel     DISCONTINUED: diclofenac (VOLTAREN) 1 % GEL topical gel     Lungs CTAB, afebrile. Pt with acute bronchitis that is improving, so likely viral in etiology. Pain c/w sprain of ribs secondary to coughing. Will Rx cough suppressant and Voltaren gel as pt does not tolerate PO NSAIDs and Tylenol is not effective.    I have discussed any lab or imaging results, the patient's diagnosis, and my plan of treatment with the patient and/or family. Patient is aware to come back in if with worsening symptoms or if no relief despite treatment plan.  Patient voiced understanding and had no further questions.       Follow Up: Return if symptoms worsen or fail to improve.    DIEGO DeanA, PA-C  [facility]

## 2017-11-29 NOTE — MR AVS SNAPSHOT
After Visit Summary   11/29/2017    Gisela Pak    MRN: [id number]           Patient Information     Date Of Birth          1960        Visit Information        Provider Department      11/29/2017 8:20 AM Dorene Champagne PA-C [facility]        Today's Diagnoses     Acute bronchitis, unspecified organism    -  1    Sprain of costal cartilage, initial encounter           Follow-ups after your visit        Follow-up notes from your care team     Return if symptoms worsen or fail to improve.      Who to contact     If you have questions or need follow up information about today's clinic visit or your schedule please contact [facility] directly at [phone].  Normal or non-critical lab and imaging results will be communicated to you by MIG Chinahart, letter or phone within 4 business days after the clinic has received the results. If you do not hear from us within 7 days, please contact the clinic through MIG Chinahart or phone. If you have a critical or abnormal lab result, we will notify you by phone as soon as possible.  Submit refill requests through Start[employer]ak or call your pharmacy and they will forward the refill request to us. Please allow 3 business days for your refill to be completed.          Additional Information About Your Visit        MyChart Information     Start[employer] Freak gives you secure access to your electronic health record. If you see a primary care provider, you can also send messages to your care team and make appointments. If you have questions, please call your primary care clinic.  If you do not have a primary care provider, please call [phone] and they will assist you.        Care EveryWhere ID     This is your Care EveryWhere ID. This could be used by other organizations to access your [place] medical records  [id number]        Your Vitals Were     Pulse Temperature Respirations Last Period Pulse Oximetry BMI (Body Mass Index)    72 98.2  F  (36.8  C) (Tympanic) 16 09/14/2011 99% 31.93 kg/m2       Blood Pressure from Last 3 Encounters:   11/29/17 122/82   08/16/17 118/80   06/05/17 (!) 130/25    Weight from Last 3 Encounters:   11/29/17 186 lb (84.4 kg)   08/16/17 190 lb (86.2 kg)   06/05/17 194 lb 12 oz (88.3 kg)              Today, you had the following     No orders found for display         Today's Medication Changes          These changes are accurate as of: 11/29/17  9:00 AM.  If you have any questions, ask your nurse or doctor.               Start taking these medicines.        Dose/Directions    benzonatate 100 MG capsule   Commonly known as:  TESSALON   Used for:  Acute bronchitis, unspecified organism   Started by:  Dorene Champagne PA-C        Dose:  100 mg   Take 1 capsule (100 mg) by mouth 3 times daily as needed for cough   Quantity:  16 capsule   Refills:  0       diclofenac 1 % Gel topical gel   Commonly known as:  VOLTAREN   Used for:  Sprain of costal cartilage, initial encounter   Started by:  Dorene Champagne PA-C        Dose:  2 g   Place 2 g onto the skin 4 times daily Do not apply to broken, infected, or inflamed skin. Avoid heat or occlusive dressings.   Quantity:  100 g   Refills:  1            Where to get your medicines      These medications were sent to [facility] [address]  [address] [facility] [address]     Phone:  [phone]     benzonatate 100 MG capsule    diclofenac 1 % Gel topical gel                Primary Care Provider Office Phone # Fax #    Mel Beavers [phone]       [address]  [facility] [address]        Equal Access to Services     NATALIO PALACIOS AH: Neymar Villarreal, gosia bray, abimbola jin. [facility] [phone].    ATENCIÓN: Si habla español, tiene a craig disposición servicios gratuitos de asistencia lingüística. Llame al [phone].    We  comply with applicable federal civil rights laws and [place] laws. We do not discriminate on the basis of race, color, national origin, age, disability, sex, sexual orientation, or gender identity.            Thank you!     Thank you for choosing [facility]  for your care. Our goal is always to provide you with excellent care. Hearing back from our patients is one way we can continue to improve our services. Please take a few minutes to complete the written survey that you may receive in the mail after your visit with us. Thank you!             Your Updated Medication List - Protect others around you: Learn how to safely use, store and throw away your medicines at www.disposemymeds.org.          This list is accurate as of: 11/29/17  9:00 AM.  Always use your most recent med list.                   Brand Name Dispense Instructions for use Diagnosis    benzonatate 100 MG capsule    TESSALON    16 capsule    Take 1 capsule (100 mg) by mouth 3 times daily as needed for cough    Acute bronchitis, unspecified organism       Butalbital-Acetaminophen  MG Tabs per tablet    PHRENILIN    30 tablet    Take 1-2 tablets by mouth every 6 hours as needed for headaches    Migraine with aura and without status migrainosus, not intractable       clonazePAM 0.5 MG tablet    klonoPIN    30 tablet    Take 0.5-1 tablets (0.25-0.5 mg) by mouth nightly as needed for anxiety    Primary insomnia, Generalized anxiety disorder       diclofenac 1 % Gel topical gel    VOLTAREN    100 g    Place 2 g onto the skin 4 times daily Do not apply to broken, infected, or inflamed skin. Avoid heat or occlusive dressings.    Sprain of costal cartilage, initial encounter       OMEGA-3 CF 1000 MG Caps      1 tab daily        omeprazole 20 MG CR capsule    priLOSEC    30 capsule    Take 1 capsule (20 mg) by mouth daily    Abdominal pain, epigastric       VITAMIN D PO      Reported on 3/23/2017    Well woman exam with routine  gynecological exam

## 2017-11-29 NOTE — PROGRESS NOTES
"  SUBJECTIVE:   Gisela Pak is a 57 year old female who presents to clinic today for the following health issues:      Pt comes in clinic today with pain under the left breast she states that the pains at a 6/10. She states she has had a bad cold and feels like could have strained a muscle from all of the coughing .    {additional problems for provider to add:281699}    Problem list and histories reviewed & adjusted, as indicated.  Additional history: {NONE - AS DOCUMENTED:486359::\"as documented\"}    {HIST REVIEW/ LINKS 2:889470}    Reviewed and updated as needed this visit by clinical staff       Reviewed and updated as needed this visit by Provider         {PROVIDER CHARTING PREFERENCE:761612}    "

## 2017-12-01 DIAGNOSIS — G43.109 MIGRAINE WITH AURA AND WITHOUT STATUS MIGRAINOSUS, NOT INTRACTABLE: ICD-10-CM

## 2017-12-01 NOTE — TELEPHONE ENCOUNTER
Medication Detail      Disp Refills Start End PAMELA   Butalbital-Acetaminophen (PHRENILIN)  MG TABS per tablet 30 tablet 0 5/24/2017  No   Sig: Take 1-2 tablets by mouth every 6 hours as needed for headaches   Patient not taking: Reported on 11/29/2017        Class: Local Print   Route: Oral   Order: [id number]       Last Office Visit: 11/29/2017    Future Office visit:       Routing refill request to provider for review/approval because:  Drug not on the [facility], P or [facility] refill protocol or controlled substance

## 2017-12-04 RX ORDER — BUTALBITAL/ACETAMINOPHEN 50MG-325MG
TABLET ORAL
Qty: 30 TABLET | Refills: 0 | Status: SHIPPED | OUTPATIENT
Start: 2017-12-04 | End: 2018-08-01

## 2018-01-02 ENCOUNTER — ALLIED HEALTH/NURSE VISIT (OUTPATIENT)
Dept: NURSING | Facility: CLINIC | Age: 58
End: 2018-01-02
Payer: COMMERCIAL

## 2018-01-02 DIAGNOSIS — Z23 NEED FOR PROPHYLACTIC VACCINATION AND INOCULATION AGAINST INFLUENZA: Primary | ICD-10-CM

## 2018-01-02 PROCEDURE — 99207 ZZC NO CHARGE NURSE ONLY: CPT

## 2018-01-02 PROCEDURE — 90686 IIV4 VACC NO PRSV 0.5 ML IM: CPT

## 2018-01-02 PROCEDURE — 90471 IMMUNIZATION ADMIN: CPT

## 2018-01-02 NOTE — PROGRESS NOTES

## 2018-01-02 NOTE — MR AVS SNAPSHOT
After Visit Summary   1/2/2018    Gisela Pak    MRN: [id number]           Patient Information     Date Of Birth          1960        Visit Information        Provider Department      1/2/2018 8:30 AM  MEDICAL ASSISTANT [place] [facility]nt[facility]        Today's Diagnoses     Need for prophylactic vaccination and inoculation against influenza    -  1       Follow-ups after your visit        Who to contact     If you have questions or need follow up information about today's clinic visit or your schedule please [facility] directly at [phone].  Normal or non-critical lab and imaging results will be communicated to you by YelloYellohart, letter or phone within 4 business days after the clinic has received the results. If you do not hear from us within 7 days, please contact the clinic through Weddingfult or phone. If you have a critical or abnormal lab result, we will notify you by phone as soon as possible.  Submit refill requests through Incuvo or call your pharmacy and they will forward the refill request to us. Please allow 3 business days for your refill to be completed.          Additional Information About Your Visit        MyChart Information     Incuvo gives you secure access to your electronic health record. If you see a primary care provider, you can also send messages to your care team and make appointments. If you have questions, please call your primary care clinic.  If you do not have a primary care provider, please call [phone] and they will assist you.        Care EveryWhere ID     This is your Care EveryWhere ID. This could be used by other organizations to access your [place] medical records  [id number]        Your Vitals Were     Last Period                   09/14/2011            Blood Pressure from Last 3 Encounters:   11/29/17 122/82   08/16/17 118/80   06/05/17 (!) 130/25    Weight from Last 3 Encounters:   11/29/17 186 lb (84.4 kg)   08/16/17  190 lb (86.2 kg)   06/05/17 194 lb 12 oz (88.3 kg)              We Performed the Following     FLU VAC, SPLIT VIRUS IM > 3 YO (QUADRIVALENT) [88460]     Vaccine Administration, Initial [20717]        Primary Care Provider Office Phone # Fax #    Mel Beavers [phone]       [address]  [facility] [address]        Equal Access to Services     NATALIO PALACIOS : Hadii aad ku hadasho Soomaali, waaxda luqadaha, qaybta kaalmada adeegyada, waxay idiin hayaan adeeg kharash lajanell . So [facility] [phone].    ATENCIÓN: Si stefania bourgeois, tiene a craig disposición servicios gratuitos de asistencia lingüística. Llame al [phone].    We comply with applicable federal civil rights laws and [place] laws. We do not discriminate on the basis of race, color, national origin, age, disability, sex, sexual orientation, or gender identity.            Thank you!     Thank you for choosing [facility]  for your care. Our goal is always to provide you with excellent care. Hearing back from our patients is one way we can continue to improve our services. Please take a few minutes to complete the written survey that you may receive in the mail after your visit with us. Thank you!             Your Updated Medication List - Protect others around you: Learn how to safely use, store and throw away your medicines at www.disposemymeds.org.          This list is accurate as of: 1/2/18  8:36 AM.  Always use your most recent med list.                   Brand Name Dispense Instructions for use Diagnosis    benzonatate 100 MG capsule    TESSALON    16 capsule    Take 1 capsule (100 mg) by mouth 3 times daily as needed for cough    Acute bronchitis, unspecified organism       Butalbital-Acetaminophen  MG Tabs per tablet    PHRENILIN    30 tablet    TAKE ONE OR TWO TABLETS BY MOUTH EVERY SIX HOURS AS NEEDED for headaches    Migraine with aura and without status migrainosus, not intractable       clonazePAM  0.5 MG tablet    klonoPIN    30 tablet    Take 0.5-1 tablets (0.25-0.5 mg) by mouth nightly as needed for anxiety    Primary insomnia, Generalized anxiety disorder       diclofenac 1 % Gel topical gel    VOLTAREN    100 g    Place 2 g onto the skin 4 times daily Do not apply to broken, infected, or inflamed skin. Avoid heat or occlusive dressings.    Sprain of costal cartilage, initial encounter       OMEGA-3 CF 1000 MG Caps      1 tab daily        omeprazole 20 MG CR capsule    priLOSEC    30 capsule    Take 1 capsule (20 mg) by mouth daily    Abdominal pain, epigastric       VITAMIN D PO      Reported on 3/23/2017    Well woman exam with routine gynecological exam

## 2018-01-05 ENCOUNTER — TELEPHONE (OUTPATIENT)
Dept: FAMILY MEDICINE | Facility: CLINIC | Age: 58
End: 2018-01-05

## 2018-01-05 DIAGNOSIS — F51.01 PRIMARY INSOMNIA: ICD-10-CM

## 2018-01-05 DIAGNOSIS — F41.1 GENERALIZED ANXIETY DISORDER: ICD-10-CM

## 2018-01-05 RX ORDER — CLONAZEPAM 0.5 MG/1
0.25-0.5 TABLET ORAL
Qty: 30 TABLET | Refills: 0 | Status: CANCELLED | OUTPATIENT
Start: 2018-01-05

## 2018-01-05 NOTE — TELEPHONE ENCOUNTER
Team coordinators-Please contact patient and inform her Clonazepam will be addressed within office visit.    Thank you!  KEVIN VillafanaN, RN

## 2018-01-05 NOTE — TELEPHONE ENCOUNTER
"Feels like this needs and ov - otherwise can wait until pcp in office on Monday.  Thanks  Melanie \"Kulwinder\" MALICK Lewis   "

## 2018-01-05 NOTE — TELEPHONE ENCOUNTER
Patient would like a refill of clonazePAM (KLONOPIN) 0.5 MG tablet, she last filled in June 2017.   She has scheduled her physical for 1/15/18.    Please send to [facility]    Call patient at [phone] to advise if/when rx is sent.

## 2018-01-05 NOTE — TELEPHONE ENCOUNTER
"Routing refill request to provider for review/approval because:  Drug not on the [facility] refill protocol     Last refill was on 6/5/17 for #30.    Per 6/5/17 office visit note:  \"2. Generalized anxiety disorder  Reviewed that clonazepam is a benzo which is a controlled substance.  Reviewed clinic's refill policy regarding controlled substances - must be refilled in person at face-to-face visits.  We did do a formal controlled substance agreement today.  Discussed that I am not concerned about her minimal use of this.  I do have concerns about uncontrolled depression and she is considering a trial of antidepressant.    - clonazePAM (KLONOPIN) 0.5 MG tablet; Take 0.5-1 tablets (0.25-0.5 mg) by mouth nightly as needed for anxiety  Dispense: 30 tablet; Refill: 0\"    Covering providers-Please review.  Writer planned on having patient informed of clinic policy regarding controlled substances.    Thank you!  KEVIN VillafanaN, RN    "

## 2018-01-15 ENCOUNTER — OFFICE VISIT (OUTPATIENT)
Dept: FAMILY MEDICINE | Facility: CLINIC | Age: 58
End: 2018-01-15
Payer: COMMERCIAL

## 2018-01-15 VITALS
BODY MASS INDEX: 31.84 KG/M2 | RESPIRATION RATE: 14 BRPM | HEART RATE: 70 BPM | TEMPERATURE: 98.1 F | SYSTOLIC BLOOD PRESSURE: 125 MMHG | OXYGEN SATURATION: 99 % | HEIGHT: 64 IN | DIASTOLIC BLOOD PRESSURE: 78 MMHG | WEIGHT: 186.5 LBS

## 2018-01-15 DIAGNOSIS — Z78.0 ASYMPTOMATIC POSTMENOPAUSAL STATUS: ICD-10-CM

## 2018-01-15 DIAGNOSIS — L72.3 SEBACEOUS CYST: ICD-10-CM

## 2018-01-15 DIAGNOSIS — F41.1 GENERALIZED ANXIETY DISORDER: ICD-10-CM

## 2018-01-15 DIAGNOSIS — Z13.220 LIPID SCREENING: ICD-10-CM

## 2018-01-15 DIAGNOSIS — F51.01 PRIMARY INSOMNIA: ICD-10-CM

## 2018-01-15 DIAGNOSIS — K21.9 GASTROESOPHAGEAL REFLUX DISEASE, ESOPHAGITIS PRESENCE NOT SPECIFIED: Primary | ICD-10-CM

## 2018-01-15 PROCEDURE — 99214 OFFICE O/P EST MOD 30 MIN: CPT | Performed by: FAMILY MEDICINE

## 2018-01-15 RX ORDER — CLONAZEPAM 0.5 MG/1
0.25-0.5 TABLET ORAL
Qty: 30 TABLET | Refills: 0 | Status: SHIPPED | OUTPATIENT
Start: 2018-01-15 | End: 2018-08-01

## 2018-01-15 ASSESSMENT — PATIENT HEALTH QUESTIONNAIRE - PHQ9
SUM OF ALL RESPONSES TO PHQ QUESTIONS 1-9: 5
5. POOR APPETITE OR OVEREATING: SEVERAL DAYS

## 2018-01-15 ASSESSMENT — ANXIETY QUESTIONNAIRES
5. BEING SO RESTLESS THAT IT IS HARD TO SIT STILL: SEVERAL DAYS
2. NOT BEING ABLE TO STOP OR CONTROL WORRYING: SEVERAL DAYS
7. FEELING AFRAID AS IF SOMETHING AWFUL MIGHT HAPPEN: SEVERAL DAYS
3. WORRYING TOO MUCH ABOUT DIFFERENT THINGS: SEVERAL DAYS
6. BECOMING EASILY ANNOYED OR IRRITABLE: NOT AT ALL
1. FEELING NERVOUS, ANXIOUS, OR ON EDGE: SEVERAL DAYS
GAD7 TOTAL SCORE: 6

## 2018-01-15 NOTE — MR AVS SNAPSHOT
After Visit Summary   1/15/2018    Gisela Pak    MRN: [id number]           Patient Information     Date Of Birth          1960        Visit Information        Provider Department      1/15/2018 2:20 PM Mel Beavers MD [facility]        Today's Diagnoses     Gastroesophageal reflux disease, esophagitis presence not specified    -  1    Primary insomnia        Generalized anxiety disorder        Asymptomatic postmenopausal status        Lipid screening        Sebaceous cyst          Care Instructions    Schedule a fasting lab-only appointment.  Fast for 10 hours prior to labs: nothing to eat or drink except plain water and your pills for ten hours prior to appointment.  In addition, avoid fatty foods and alcohol for 24 hours prior to appointment.    Schedule a DEXA (bone density) scan at [facility] at [phone].      You can alternate between ranitidine (ZANTAC) an H2 blocker and omeprazole (PRILOSEC) a proton pump inhibitor.  The H2 blockers are safer long term.              Follow-ups after your visit        Future tests that were ordered for you today     Open Future Orders        Priority Expected Expires Ordered    Lipid panel reflex to direct LDL Fasting Routine 1/15/2018 1/15/2019 1/15/2018    DX Hip/Pelvis/Spine Routine  1/15/2019 1/15/2018            Who to contact     If you have questions or need follow up information about today's clinic visit or your schedule please contact [facility] directly at [phone].  Normal or non-critical lab and imaging results will be communicated to you by MyChart, letter or phone within 4 business days after the clinic has received the results. If you do not hear from us within 7 days, please contact the clinic through MyChart or phone. If you have a critical or abnormal lab result, we will notify you by phone as soon as possible.  Submit refill requests through MoneyMenttor or call your pharmacy and  "they will forward the refill request to us. Please allow 3 business days for your refill to be completed.          Additional Information About Your Visit        Palyon Medicalhart Information     CadenceMD gives you secure access to your electronic health record. If you see a primary care provider, you can also send messages to your care team and make appointments. If you have questions, please call your primary care clinic.  If you do not have a primary care provider, please call [phone] and they will assist you.        Care EveryWhere ID     This is your Care EveryWhere ID. This could be used by other organizations to access your [place] medical records  [id number]        Your Vitals Were     Pulse Temperature Respirations Height Last Period Pulse Oximetry    70 98.1  F (36.7  C) (Oral) 14 5' 3.5\" (1.613 m) 09/14/2011 99%    BMI (Body Mass Index)                   32.52 kg/m2            Blood Pressure from Last 3 Encounters:   01/15/18 125/78   11/29/17 122/82   08/16/17 118/80    Weight from Last 3 Encounters:   01/15/18 186 lb 8 oz (84.6 kg)   11/29/17 186 lb (84.4 kg)   08/16/17 190 lb (86.2 kg)                 Today's Medication Changes          These changes are accurate as of: 1/15/18  3:31 PM.  If you have any questions, ask your nurse or doctor.               Start taking these medicines.        Dose/Directions    ranitidine 150 MG tablet   Commonly known as:  ZANTAC   Used for:  Gastroesophageal reflux disease, esophagitis presence not specified   Started by:  Mel Beavers MD        Dose:  150 mg   Take 1 tablet (150 mg) by mouth 2 times daily as needed for heartburn   Quantity:  60 tablet   Refills:  1         Stop taking these medicines if you haven't already. Please contact your care team if you have questions.     benzonatate 100 MG capsule   Commonly known as:  TESSALON   Stopped by:  Mel Beavers MD           diclofenac 1 % Gel topical gel   Commonly known as:  VOLTAREN   Stopped by:  " Mel Beavers MD                Where to get your medicines      These medications were sent to [facility] [place], [place] - [address]  [address], [facility] [address]     Phone:  [phone]     omeprazole 20 MG CR capsule    ranitidine 150 MG tablet         Some of these will need a paper prescription and others can be bought over the counter.  Ask your nurse if you have questions.     Bring a paper prescription for each of these medications     clonazePAM 0.5 MG tablet                Primary Care Provider Office Phone # Fax #    Mel Beavers [phone]       [address]  [facility] [address]        Equal Access to Services     NATALIO PALACIOS : Neymar Villarreal, wasalinasda katarina, qaybta kaalmada jakub, abimbola crocker. So [facility] [phone].    ATENCIÓN: Si habla español, tiene a craig disposición servicios gratuitos de asistencia lingüística. Llame al [phone].    We comply with applicable federal civil rights laws and [place] laws. We do not discriminate on the basis of race, color, national origin, age, disability, sex, sexual orientation, or gender identity.            Thank you!     Thank you for choosing [facility]  for your care. Our goal is always to provide you with excellent care. Hearing back from our patients is one way we can continue to improve our services. Please take a few minutes to complete the written survey that you may receive in the mail after your visit with us. Thank you!             Your Updated Medication List - Protect others around you: Learn how to safely use, store and throw away your medicines at www.disposemymeds.org.          This list is accurate as of: 1/15/18  3:31 PM.  Always use your most recent med list.                   Brand Name Dispense Instructions for use Diagnosis    Butalbital-Acetaminophen  MG Tabs per tablet    PHRENILIN    30 tablet     TAKE ONE OR TWO TABLETS BY MOUTH EVERY SIX HOURS AS NEEDED for headaches    Migraine with aura and without status migrainosus, not intractable       clonazePAM 0.5 MG tablet    klonoPIN    30 tablet    Take 0.5-1 tablets (0.25-0.5 mg) by mouth nightly as needed for anxiety    Primary insomnia, Generalized anxiety disorder       OMEGA-3 CF 1000 MG Caps      1 tab daily        omeprazole 20 MG CR capsule    priLOSEC    30 capsule    Take 1 capsule (20 mg) by mouth daily    Gastroesophageal reflux disease, esophagitis presence not specified       ranitidine 150 MG tablet    ZANTAC    60 tablet    Take 1 tablet (150 mg) by mouth 2 times daily as needed for heartburn    Gastroesophageal reflux disease, esophagitis presence not specified       VITAMIN D PO      Reported on 3/23/2017    Well woman exam with routine gynecological exam

## 2018-01-15 NOTE — PROGRESS NOTES
SUBJECTIVE:  Gisela Pak, a 57 year old female, is here to discuss the following issues:       Migraine Follow-Up    Headaches symptoms:  Improved since menopause - mostly just in fall and spring now.  She gets  headache with nausea - no photo- or phono-sensitivity     Frequency: 20-30 per year     Duration of headaches: days    Able to do normal daily activities/work with migraines: Yes    Rescue/Relief medication: butalbital-acetaminophen               Effectiveness: good relief    Preventative medication: None    Neurologic complications: No new stroke-like symptoms, loss of vision or speech, numbness or weakness    In the past 4 weeks, how often have you gone to Urgent Care or the emergency room because of your headaches?  0      Anxiety Follow-Up  She continues to take clonazepam as needed (mostly related to travel) with no noted side effects.    She also used recently when her mother .  She describes her mood as stable.    PHQ-9 SCORE 2016 2017 1/15/2018   Total Score - - -   Total Score 3 7 5     KIRSTIN-7 SCORE 2016 2017 1/15/2018   Total Score - - -   Total Score 6 8 6       GERD Follow-Up  Current/recent symptoms: nausea, heartburn, belching    Heartburn?: Yes     Acid taste in throat?: No    Belching?: Yes    Difficult or painful swallowing?: No  She gets this intermittently and has used prilosec episodically.  Symptoms have been worse for the past couple months.  She started OTC omeprazole a week ago but she has concerns about taking this due to side effect concerns.       Also: Lump on abdomen  Feels like it is just under the skin.  Changes in size (grows and shrinks)  Sometimes tender to the touch.  Never red or swollen.      Problem list and histories reviewed & updated, as indicated.  Patient Active Problem List   Diagnosis     Disturbance of skin sensation     Tachycardia     Insomnia     Mixed urge and stress incontinence     Generalized anxiety disorder     Classical  "migraine     Varicose veins of legs     Ganglion of tendon sheath     Lumbago     Hip pain, left       BP Readings from Last 3 Encounters:   01/15/18 125/78   11/29/17 122/82   08/16/17 118/80    Wt Readings from Last 3 Encounters:   01/15/18 186 lb 8 oz (84.6 kg)   11/29/17 186 lb (84.4 kg)   08/16/17 190 lb (86.2 kg)          ROS:  ENT: NEGATIVE for hoarseness  RESP: NEGATIVE for cough       OBJECTIVE:    /78  Pulse 70  Temp 98.1  F (36.7  C) (Oral)  Resp 14  Ht 5' 3.5\" (1.613 m)  Wt 186 lb 8 oz (84.6 kg)  LMP 09/14/2011  SpO2 99%  BMI 32.52 kg/m2  GEN:  no apparent distress  ABD:  soft, mild epigastric/LUQ tenderness to palpation, no mass, no hepatosplenomegaly, no hernias  SKIN:  Small (lentil-sized) firm cutaneous lump of epigastric region of abdomen - no redness    ASSESSMENT/PLAN:  1. Gastroesophageal reflux disease, esophagitis presence not specified  With periodic flares.  Discussed H2 blockers vs PPI's - she'll keep both on hand with goal of using minimally.  We have previously discussed lifestyle changes for management.    - omeprazole (PRILOSEC) 20 MG CR capsule; Take 1 capsule (20 mg) by mouth daily  Dispense: 30 capsule; Refill: 0  - ranitidine (ZANTAC) 150 MG tablet; Take 1 tablet (150 mg) by mouth 2 times daily as needed for heartburn  Dispense: 60 tablet; Refill: 1    2. Primary insomnia  3. Generalized anxiety disorder  stable/well controlled   - clonazePAM (KLONOPIN) 0.5 MG tablet; Take 0.5-1 tablets (0.25-0.5 mg) by mouth nightly as needed for anxiety  Dispense: 30 tablet; Refill: 0    4. Asymptomatic postmenopausal status  Mother with osteoporosis - we'll screen now  - DX Hip/Pelvis/Spine; Future    5. Lipid screening  - Lipid panel reflex to direct LDL Fasting; Future    6. Sebaceous cyst  reassured     Return to Clinic in 6 months    Patient Instructions   Schedule a fasting lab-only appointment.  Fast for 10 hours prior to labs: nothing to eat or drink except plain water and " your pills for ten hours prior to appointment.  In addition, avoid fatty foods and alcohol for 24 hours prior to appointment.    Schedule a DEXA (bone density) scan at [facility] at [phone].      You can alternate between ranitidine (ZANTAC) an H2 blocker and omeprazole (PRILOSEC) a proton pump inhibitor.  The H2 blockers are safer long term.      Mel Beavers MD   [facility]

## 2018-01-15 NOTE — PATIENT INSTRUCTIONS
Schedule a fasting lab-only appointment.  Fast for 10 hours prior to labs: nothing to eat or drink except plain water and your pills for ten hours prior to appointment.  In addition, avoid fatty foods and alcohol for 24 hours prior to appointment.    Schedule a DEXA (bone density) scan at [facility] at [phone].      You can alternate between ranitidine (ZANTAC) an H2 blocker and omeprazole (PRILOSEC) a proton pump inhibitor.  The H2 blockers are safer long term.

## 2018-01-16 ASSESSMENT — ANXIETY QUESTIONNAIRES: GAD7 TOTAL SCORE: 6

## 2018-03-15 ENCOUNTER — OFFICE VISIT (OUTPATIENT)
Dept: FAMILY MEDICINE | Facility: CLINIC | Age: 58
End: 2018-03-15
Payer: COMMERCIAL

## 2018-03-15 ENCOUNTER — ALLIED HEALTH/NURSE VISIT (OUTPATIENT)
Dept: NURSING | Facility: CLINIC | Age: 58
End: 2018-03-15
Payer: COMMERCIAL

## 2018-03-15 VITALS
WEIGHT: 187 LBS | DIASTOLIC BLOOD PRESSURE: 73 MMHG | HEART RATE: 65 BPM | BODY MASS INDEX: 31.92 KG/M2 | RESPIRATION RATE: 15 BRPM | SYSTOLIC BLOOD PRESSURE: 107 MMHG | TEMPERATURE: 97.8 F | HEIGHT: 64 IN

## 2018-03-15 VITALS — HEART RATE: 66 BPM | SYSTOLIC BLOOD PRESSURE: 122 MMHG | DIASTOLIC BLOOD PRESSURE: 78 MMHG

## 2018-03-15 DIAGNOSIS — E78.00 PURE HYPERCHOLESTEROLEMIA: ICD-10-CM

## 2018-03-15 DIAGNOSIS — R00.2 PALPITATIONS: Primary | ICD-10-CM

## 2018-03-15 DIAGNOSIS — I10 BENIGN ESSENTIAL HYPERTENSION: Primary | ICD-10-CM

## 2018-03-15 DIAGNOSIS — R68.89 TEMPERATURE INTOLERANCE: ICD-10-CM

## 2018-03-15 DIAGNOSIS — Z13.220 LIPID SCREENING: ICD-10-CM

## 2018-03-15 LAB
BASOPHILS # BLD AUTO: 0 10E9/L (ref 0–0.2)
BASOPHILS NFR BLD AUTO: 0.4 %
CHOLEST SERPL-MCNC: 261 MG/DL
DIFFERENTIAL METHOD BLD: NORMAL
EOSINOPHIL # BLD AUTO: 0.1 10E9/L (ref 0–0.7)
EOSINOPHIL NFR BLD AUTO: 1.9 %
ERYTHROCYTE [DISTWIDTH] IN BLOOD BY AUTOMATED COUNT: 12.7 % (ref 10–15)
HCT VFR BLD AUTO: 39.5 % (ref 35–47)
HDLC SERPL-MCNC: 85 MG/DL
HGB BLD-MCNC: 13.3 G/DL (ref 11.7–15.7)
LDLC SERPL CALC-MCNC: 158 MG/DL
LYMPHOCYTES # BLD AUTO: 2 10E9/L (ref 0.8–5.3)
LYMPHOCYTES NFR BLD AUTO: 27.7 %
MCH RBC QN AUTO: 31.8 PG (ref 26.5–33)
MCHC RBC AUTO-ENTMCNC: 33.7 G/DL (ref 31.5–36.5)
MCV RBC AUTO: 95 FL (ref 78–100)
MONOCYTES # BLD AUTO: 0.6 10E9/L (ref 0–1.3)
MONOCYTES NFR BLD AUTO: 8 %
NEUTROPHILS # BLD AUTO: 4.5 10E9/L (ref 1.6–8.3)
NEUTROPHILS NFR BLD AUTO: 62 %
NONHDLC SERPL-MCNC: 176 MG/DL
PLATELET # BLD AUTO: 247 10E9/L (ref 150–450)
RBC # BLD AUTO: 4.18 10E12/L (ref 3.8–5.2)
TRIGL SERPL-MCNC: 91 MG/DL
WBC # BLD AUTO: 7.3 10E9/L (ref 4–11)

## 2018-03-15 PROCEDURE — 80048 BASIC METABOLIC PNL TOTAL CA: CPT | Performed by: FAMILY MEDICINE

## 2018-03-15 PROCEDURE — 84443 ASSAY THYROID STIM HORMONE: CPT | Performed by: FAMILY MEDICINE

## 2018-03-15 PROCEDURE — 93000 ELECTROCARDIOGRAM COMPLETE: CPT | Performed by: FAMILY MEDICINE

## 2018-03-15 PROCEDURE — 99214 OFFICE O/P EST MOD 30 MIN: CPT | Performed by: FAMILY MEDICINE

## 2018-03-15 PROCEDURE — 99207 ZZC NO CHARGE NURSE ONLY: CPT

## 2018-03-15 PROCEDURE — 85025 COMPLETE CBC W/AUTO DIFF WBC: CPT | Performed by: FAMILY MEDICINE

## 2018-03-15 PROCEDURE — 80061 LIPID PANEL: CPT | Performed by: FAMILY MEDICINE

## 2018-03-15 PROCEDURE — 36415 COLL VENOUS BLD VENIPUNCTURE: CPT | Performed by: FAMILY MEDICINE

## 2018-03-15 NOTE — MR AVS SNAPSHOT
After Visit Summary   3/15/2018    Gisela Pak    MRN: [id number]           Patient Information     Date Of Birth          1960        Visit Information        Provider Department      3/15/2018 3:20 PM Mel Beavers MD [facility]        Today's Diagnoses     Temperature intolerance    -  1    Palpitations          Care Instructions    Results for orders placed or performed in visit on 03/15/18   Lipid panel reflex to direct LDL Fasting   Result Value Ref Range    Cholesterol 261 (H) <200 mg/dL    Triglycerides 91 <150 mg/dL    HDL Cholesterol 85 >49 mg/dL    LDL Cholesterol Calculated 158 (H) <100 mg/dL    Non HDL Cholesterol 176 (H) <130 mg/dL        If your palpitations worsen or become associated with chest pain, dizziness, shortness of breath let us know.  Otherwise observe.  We'll consider cardiology consult at [facility] in April.          Follow-ups after your visit        Who to contact     If you have questions or need follow up information about today's clinic visit or your schedule please contact [facility] directly at [phone].  Normal or non-critical lab and imaging results will be communicated to you by Kitara Mediahart, letter or phone within 4 business days after the clinic has received the results. If you do not hear from us within 7 days, please contact the clinic through [email or web address] or phone. If you have a critical or abnormal lab result, we will notify you by phone as soon as possible.  Submit refill requests through Grid2020 or call your pharmacy and they will forward the refill request to us. Please allow 3 business days for your refill to be completed.          Additional Information About Your Visit        Kitara Mediahart Information     Grid2020 gives you secure access to your electronic health record. If you see a primary care provider, you can also send messages to your care team and make appointments. If you have questions, please  "call your primary care clinic.  If you do not have a primary care provider, please call [phone] and they will assist you.        Care EveryWhere ID     This is your Care EveryWhere ID. This could be used by other organizations to access your [place] medical records  [id number]        Your Vitals Were     Pulse Temperature Respirations Height Last Period BMI (Body Mass Index)    65 97.8  F (36.6  C) (Oral) 15 5' 3.5\" (1.613 m) 09/14/2011 32.61 kg/m2       Blood Pressure from Last 3 Encounters:   03/15/18 107/73   03/15/18 122/78   01/15/18 125/78    Weight from Last 3 Encounters:   03/15/18 187 lb (84.8 kg)   01/15/18 186 lb 8 oz (84.6 kg)   11/29/17 186 lb (84.4 kg)              We Performed the Following     Basic metabolic panel     CBC with platelets differential     EKG 12-lead complete w/read - Clinics     EKG 12-lead, tracing only     EKG 12-lead, tracing only     TSH with free T4 reflex          Today's Medication Changes          These changes are accurate as of 3/15/18  4:30 PM.  If you have any questions, ask your nurse or doctor.               Stop taking these medicines if you haven't already. Please contact your care team if you have questions.     OMEGA-3 CF 1000 MG Caps   Stopped by:  Mel Beavers MD           omeprazole 20 MG CR capsule   Commonly known as:  priLOSEC   Stopped by:  Mel Beavers MD           ranitidine 150 MG tablet   Commonly known as:  ZANTAC   Stopped by:  Mel Beavers MD           VITAMIN D PO   Stopped by:  Mel Beavers MD                    Primary Care Provider Office Phone # Fax #    Mel Beavers [phone] [address] [place] [address]        Equal Access to Services     REINALDO PALACIOS : Neymar Villarreal, wachance luqadaha, qaybta kaalmada jakub, abimbola crocker. So [facility] [phone].    ATENCIÓN: Si habla español, tiene a craig disposición servicios gratuitos de " asistencia lingüística. Milena al [phone].    We comply with applicable federal civil rights laws and [place] laws. We do not discriminate on the basis of race, color, national origin, age, disability, sex, sexual orientation, or gender identity.            Thank you!     Thank you for choosing [facility]  for your care. Our goal is always to provide you with excellent care. Hearing back from our patients is one way we can continue to improve our services. Please take a few minutes to complete the written survey that you may receive in the mail after your visit with us. Thank you!             Your Updated Medication List - Protect others around you: Learn how to safely use, store and throw away your medicines at www.disposemymeds.org.          This list is accurate as of 3/15/18  4:30 PM.  Always use your most recent med list.                   Brand Name Dispense Instructions for use Diagnosis    Butalbital-Acetaminophen  MG Tabs per tablet    PHRENILIN    30 tablet    TAKE ONE OR TWO TABLETS BY MOUTH EVERY SIX HOURS AS NEEDED for headaches    Migraine with aura and without status migrainosus, not intractable       clonazePAM 0.5 MG tablet    klonoPIN    30 tablet    Take 0.5-1 tablets (0.25-0.5 mg) by mouth nightly as needed for anxiety    Primary insomnia, Generalized anxiety disorder

## 2018-03-15 NOTE — PROGRESS NOTES
"  SUBJECTIVE:   Gisela Pak is a 57 year old female who presents to clinic today for the following health issues:      Heart palpitations   Has had this in the past but it has been happening more often for past 2 weeks.  Feels a catch, then several quick beats..  Can occur at rest or with exertion.  Occurring daily for a couple weeks.  Similar to symptoms same time last year  Drinks coffee but not more than usual.  No associated chest pain or shortness of breath.  Some brief dizziness that is not associated with the palpitations.    Temperature dysregulation - sweaty or cold.  Does worry about her heart due to FHX CAD.  Had fasting lipids drawn this morning.      Problem list and histories reviewed & adjusted, as indicated.  Additional history: as documented    BP Readings from Last 3 Encounters:   03/15/18 107/73   03/15/18 122/78   01/15/18 125/78    Wt Readings from Last 3 Encounters:   03/15/18 187 lb (84.8 kg)   01/15/18 186 lb 8 oz (84.6 kg)   11/29/17 186 lb (84.4 kg)           Reviewed and updated as needed this visit by clinical staff  Tobacco  Allergies  Meds  Problems       Reviewed and updated as needed this visit by Provider  Meds  Problems         ROS:  GI: NEGATIVE for abdominal pain or nausea.  PSYCH: NEGATIVE for increase in anxiety or stress    OBJECTIVE:     /73  Pulse 65  Temp 97.8  F (36.6  C) (Oral)  Resp 15  Ht 5' 3.5\" (1.613 m)  Wt 187 lb (84.8 kg)  LMP 09/14/2011  BMI 32.61 kg/m2  Body mass index is 32.61 kg/(m^2).  GEN:  no apparent distress  EYES: conjunctivae and sclerae clear  NECK:  Supple without adenopathy, mass, or thyromegaly  LUNGS:  normal respiratory effort, and lungs clear to auscultation bilaterally - no rales, rhonchi or wheezes  CV: regular rate and rhythm, normal S1 S2, no S3 or S4 and no murmur, click or rub  ABD:  soft, nontender, no mass, no hepatosplenomegaly, no hernias     Diagnostic Test Results:  EKG: NSR with RBBB, normal axis, no acute ST/T " changes c/w ischemia, no LVH by voltage criteria, unchanged from previous tracings     Results for orders placed or performed in visit on 03/15/18   CBC with platelets differential   Result Value Ref Range    WBC 7.3 4.0 - 11.0 10e9/L    RBC Count 4.18 3.8 - 5.2 10e12/L    Hemoglobin 13.3 11.7 - 15.7 g/dL    Hematocrit 39.5 35.0 - 47.0 %    MCV 95 78 - 100 fl    MCH 31.8 26.5 - 33.0 pg    MCHC 33.7 31.5 - 36.5 g/dL    RDW 12.7 10.0 - 15.0 %    Platelet Count 247 150 - 450 10e9/L    Diff Method Automated Method     % Neutrophils 62.0 %    % Lymphocytes 27.7 %    % Monocytes 8.0 %    % Eosinophils 1.9 %    % Basophils 0.4 %    Absolute Neutrophil 4.5 1.6 - 8.3 10e9/L    Absolute Lymphocytes 2.0 0.8 - 5.3 10e9/L    Absolute Monocytes 0.6 0.0 - 1.3 10e9/L    Absolute Eosinophils 0.1 0.0 - 0.7 10e9/L    Absolute Basophils 0.0 0.0 - 0.2 10e9/L        ASSESSMENT/PLAN:       1. Palpitations  Unclear etiology/diagnosis with uncertain prognosis requiring further workup - labs and EKG today.  Most likely benign.  Discussed worrisome signs and symptoms and she will monitor.  If symptoms persist/worsen or if she develops associated symptoms will consider event monitor or referral to cardiology.    - TSH with free T4 reflex  - EKG 12-lead complete w/read - Clinics  - CBC with platelets differential  - Basic metabolic panel  - EKG 12-lead, tracing only  - EKG 12-lead, tracing only    2. Temperature intolerance  - TSH with free T4 reflex    3. Pure hypercholesterolemia  Reviewed lipid results from this morning.  Total cholesterol and LDL (bad) cholesterol have increased since last checked in June 2017.  She will work on diet/exercise.  The 10-year ASCVD risk score (Sofy TRACIE Jr, et al., 2013) is: 1.4%    Values used to calculate the score:      Age: 57 years      Sex: Female      Is Non- : No      Diabetic: No      Tobacco smoker: No      Systolic Blood Pressure: 107 mmHg      Is BP treated: No      HDL  Cholesterol: 85 mg/dL      Total Cholesterol: 261 mg/dL        Patient Instructions     Results for orders placed or performed in visit on 03/15/18   Lipid panel reflex to direct LDL Fasting   Result Value Ref Range    Cholesterol 261 (H) <200 mg/dL    Triglycerides 91 <150 mg/dL    HDL Cholesterol 85 >49 mg/dL    LDL Cholesterol Calculated 158 (H) <100 mg/dL    Non HDL Cholesterol 176 (H) <130 mg/dL        If your palpitations worsen or become associated with chest pain, dizziness, shortness of breath let us know.  Otherwise observe.  We'll consider cardiology consult at [facility] in April.       Mel Beavers MD  [facility]

## 2018-03-15 NOTE — PATIENT INSTRUCTIONS
Results for orders placed or performed in visit on 03/15/18   Lipid panel reflex to direct LDL Fasting   Result Value Ref Range    Cholesterol 261 (H) <200 mg/dL    Triglycerides 91 <150 mg/dL    HDL Cholesterol 85 >49 mg/dL    LDL Cholesterol Calculated 158 (H) <100 mg/dL    Non HDL Cholesterol 176 (H) <130 mg/dL        If your palpitations worsen or become associated with chest pain, dizziness, shortness of breath let us know.  Otherwise observe.  We'll consider cardiology consult at [facility] in April.

## 2018-03-15 NOTE — MR AVS SNAPSHOT
After Visit Summary   3/15/2018    Gisela Pak    MRN: [id number]           Patient Information     Date Of Birth          1960        Visit Information        Provider Department      3/15/2018 8:00 AM  [facility]        Today's Diagnoses     Benign essential hypertension    -  1       Follow-ups after your visit        Follow-up notes from your care team     Return for BP Recheck.      Your next 10 appointments already scheduled     Mar 15, 2018  3:20 PM CDT   Office Visit with Mel Beavers MD   [facility] ([facility])    [address] [place] [address] [phone]           Bring a current list of meds and any records pertaining to this visit. For Physicals, please bring immunization records and any forms needing to be filled out. Please arrive 10 minutes early to complete paperwork.              Who to contact     If you have questions or need follow up information about today's clinic visit or your schedule please contact [facility] directly at [phone].  Normal or non-critical lab and imaging results will be communicated to you by Anaphorehart, letter or phone within 4 business days after the clinic has received the results. If you do not hear from us within 7 days, please contact the clinic through Needlet or phone. If you have a critical or abnormal lab result, we will notify you by phone as soon as possible.  Submit refill requests through [employer] or call your pharmacy and they will forward the refill request to us. Please allow 3 business days for your refill to be completed.          Additional Information About Your Visit        MyChart Information     [employer] gives you secure access to your electronic health record. If you see a primary care provider, you can also send messages to your care team and make appointments. If you have questions, please call your primary care  clinic.  If you do not have a primary care provider, please call [phone] and they will assist you.        Care EveryWhere ID     This is your Care EveryWhere ID. This could be used by other organizations to access your [facility] medical records  [id number]        Your Vitals Were     Pulse Last Period                66 09/14/2011           Blood Pressure from Last 3 Encounters:   03/15/18 122/78   01/15/18 125/78   11/29/17 122/82    Weight from Last 3 Encounters:   01/15/18 186 lb 8 oz (84.6 kg)   11/29/17 186 lb (84.4 kg)   08/16/17 190 lb (86.2 kg)              Today, you had the following     No orders found for display       Primary Care Provider Office Phone # Fax #    Mel Beavers [phone] [address]  [facility] [address]        Equal Access to Services     REINALDO [facility] : Hadii aad ku hadasho Soomaali, waaxda luqadaha, qaybta kaalmada adeegyada, waxay anhin hayfridan aleyda duran . So [facility] [phone].    ATENCIÓN: Si habla español, tiene a craig disposición servicios gratuitos de asistencia lingüística. Ashleighradha al [phone].    We comply with applicable federal civil rights laws and [place] laws. We do not discriminate on the basis of race, color, national origin, age, disability, sex, sexual orientation, or gender identity.            Thank you!     Thank you for choosing [facility]  for your care. Our goal is always to provide you with excellent care. Hearing back from our patients is one way we can continue to improve our services. Please take a few minutes to complete the written survey that you may receive in the mail after your visit with us. Thank you!             Your Updated Medication List - Protect others around you: Learn how to safely use, store and throw away your medicines at www.disposemymeds.org.          This list is accurate as of 3/15/18  8:42 AM.  Always use your most recent med list.                   Brand Name Dispense  Instructions for use Diagnosis    Butalbital-Acetaminophen  MG Tabs per tablet    PHRENILIN    30 tablet    TAKE ONE OR TWO TABLETS BY MOUTH EVERY SIX HOURS AS NEEDED for headaches    Migraine with aura and without status migrainosus, not intractable       clonazePAM 0.5 MG tablet    klonoPIN    30 tablet    Take 0.5-1 tablets (0.25-0.5 mg) by mouth nightly as needed for anxiety    Primary insomnia, Generalized anxiety disorder       OMEGA-3 CF 1000 MG Caps      1 tab daily        omeprazole 20 MG CR capsule    priLOSEC    30 capsule    Take 1 capsule (20 mg) by mouth daily    Gastroesophageal reflux disease, esophagitis presence not specified       ranitidine 150 MG tablet    ZANTAC    60 tablet    Take 1 tablet (150 mg) by mouth 2 times daily as needed for heartburn    Gastroesophageal reflux disease, esophagitis presence not specified       VITAMIN D PO      Reported on 3/23/2017    Well woman exam with routine gynecological exam

## 2018-03-15 NOTE — NURSING NOTE
"Chief Complaint   Patient presents with     Allied Health Visit     bp       Initial /78 (BP Location: Left arm, Patient Position: Chair, Cuff Size: Adult Large)  Pulse 66  LMP 09/14/2011 Estimated body mass index is 32.52 kg/(m^2) as calculated from the following:    Height as of 1/15/18: 5' 3.5\" (1.613 m).    Weight as of 1/15/18: 186 lb 8 oz (84.6 kg).  Medication Reconciliation: unable or not appropriate to perform     Pt Blood Pressure was in Normal Range No Action Require.    Myles Lin MA     "

## 2018-03-15 NOTE — NURSING NOTE
"Chief Complaint   Patient presents with     Palpitations     x 2 weeks       Initial /73  Pulse 65  Temp 97.8  F (36.6  C) (Oral)  Resp 15  Ht 5' 3.5\" (1.613 m)  Wt 187 lb (84.8 kg)  LMP 09/14/2011  BMI 32.61 kg/m2 Estimated body mass index is 32.61 kg/(m^2) as calculated from the following:    Height as of this encounter: 5' 3.5\" (1.613 m).    Weight as of this encounter: 187 lb (84.8 kg).  Medication Reconciliation: complete  "

## 2018-03-16 LAB
ANION GAP SERPL CALCULATED.3IONS-SCNC: 12 MMOL/L (ref 3–14)
BUN SERPL-MCNC: 19 MG/DL (ref 7–30)
CALCIUM SERPL-MCNC: 9 MG/DL (ref 8.5–10.1)
CHLORIDE SERPL-SCNC: 106 MMOL/L (ref 94–109)
CO2 SERPL-SCNC: 22 MMOL/L (ref 20–32)
CREAT SERPL-MCNC: 0.76 MG/DL (ref 0.52–1.04)
GFR SERPL CREATININE-BSD FRML MDRD: 78 ML/MIN/1.7M2
GLUCOSE SERPL-MCNC: 76 MG/DL (ref 70–99)
POTASSIUM SERPL-SCNC: 3.9 MMOL/L (ref 3.4–5.3)
SODIUM SERPL-SCNC: 140 MMOL/L (ref 133–144)
TSH SERPL DL<=0.005 MIU/L-ACNC: 1.04 MU/L (ref 0.4–4)

## 2018-03-16 NOTE — PROGRESS NOTES
NIKOLE Waters - TSH (thyroid function test) and basic metabolic panel results (blood salts, blood sugar, and kidney function) just came back and are normal too.  Monitor your palpitations and let me know if they persist, worsen, or become associated with other symptoms (like chest pain, dizziness, shortness of breath, nausea).     Mel Beavers MD

## 2018-07-12 ENCOUNTER — TRANSFERRED RECORDS (OUTPATIENT)
Dept: HEALTH INFORMATION MANAGEMENT | Facility: CLINIC | Age: 58
End: 2018-07-12

## 2018-07-29 NOTE — PROGRESS NOTES
SUBJECTIVE:  Gisela Pak, a 58 year old female, is here to discuss the following issues:     Migraine follow-up   Headaches symptoms:  Stable   Frequency: few a month   Triggers: barometric pressure changes  Rescue/Relief medication:ibuprofen (Advil, Motrin) and if that fails she takes Butalbital              Effectiveness: moderate relief  She uses the butalbital sparingly - last received #30 tabs 8 months ago.  Preventative medication: None  Recently was at high altitude and noticed this really triggered her migraines.    Was at [facility] in [place] at 11,000 FT    Anxiety/Insomnia follow-up   Uses clonazepam prn - sparingly.    Last got #30 tabs 7 months ago and still has a few left.  Feels this is effective and primarily uses it for anxiety.  PHQ-9 SCORE 6/5/2017 1/15/2018 8/1/2018   Total Score - - -   Total Score MyChart - - 5 (Mild depression)   Total Score 7 5 5      KIRSTIN-7 SCORE 6/5/2017 1/15/2018 8/1/2018   Total Score - - -   Total Score - - 6 (mild anxiety)   Total Score 8 6 6        Varicose veins of RLE  These have worsened and are sometimes painful, especially near her medial ankle.  Associated with swelling of ankle.  Dermatologist recommended that she wear compression stocking or see vascular surgeon    Bilateral hip pain and thigh pain  Saw Dr. Dahl in April 2016 for left thigh and lateral knee pain and he was concerned about her spine.  He referred her for lumbar MRI but she did not schedule it.    Now having bilateral hip and thigh pain and has an upcoming appointment with Sports Med.      Muscle spasms of left thigh  Intermittent.  Sometimes very severe pain  Had a couple episodes a few weeks ago.    Problem list and histories reviewed & updated, as indicated.  Patient Active Problem List   Diagnosis     Disturbance of skin sensation     Tachycardia     Insomnia     Mixed urge and stress incontinence     Generalized anxiety disorder     Classical migraine     Varicose veins of legs      Ganglion of tendon sheath     Lumbago     Hip pain, left     Palpitations     Pure hypercholesterolemia       BP Readings from Last 3 Encounters:   08/01/18 114/77   03/15/18 107/73   03/15/18 122/78    Wt Readings from Last 3 Encounters:   08/01/18 188 lb (85.3 kg)   03/15/18 187 lb (84.8 kg)   01/15/18 186 lb 8 oz (84.6 kg)           ROS:  RESP: NEGATIVE for shortness of breath  CV: NEGATIVE for chest pain    OBJECTIVE:    /77 (BP Location: Right arm, Patient Position: Chair, Cuff Size: Adult Large)  Pulse 56  Temp 98.9  F (37.2  C) (Oral)  Resp 14  Wt 188 lb (85.3 kg)  LMP 09/14/2011  SpO2 97%  BMI 32.78 kg/m2  GENERAL: No apparent distress   HEAD: Normocephalic.  EYES:  No discharge or erythema.   NOSE: Normal without discharge.  SKIN: Clear. No significant rash, abnormal pigmentation or lesions.  She does have prominent varicose veins of right lower extremity with trace ankle edema  NEUROLOGIC: No focal findings. Cranial nerves grossly intact. Normal gait, strength and tone    PSYCH:    Appearance: appropriately and casually dressed    Eye Contact: good  Behavior: calm  Attitude: cooperative    Speech:  normal rate, rhythm, and tone    Thought Form: organized    Thought Content: no evidence of psychotic thought    Mood: euthymic and anxious    Affect: mood congruent    Insight: good     ASSESSMENT/PLAN:  1. Migraine with aura and without status migrainosus, not intractable  stable/well controlled   - Butalbital-Acetaminophen (PHRENILIN)  MG TABS per tablet; TAKE ONE OR TWO TABLETS BY MOUTH EVERY SIX HOURS AS NEEDED for headaches  Dispense: 30 tablet; Refill: 0    2. Varicose veins of right lower extremity  Symptomatic.  Discussed that if these progress she is at risk for venous stasis ulcers of medial ankle.    - VASCULAR SURGERY REFERRAL    3. Primary insomnia  4. Generalized anxiety disorder  stable/well controlled   - clonazePAM (KLONOPIN) 0.5 MG tablet; Take 0.5-1 tablets (0.25-0.5 mg) by  mouth nightly as needed for anxiety  Dispense: 30 tablet; Refill: 0     Return to Clinic in 6 months.       Mel Beavers MD   [facility]

## 2018-08-01 ENCOUNTER — OFFICE VISIT (OUTPATIENT)
Dept: FAMILY MEDICINE | Facility: CLINIC | Age: 58
End: 2018-08-01
Payer: COMMERCIAL

## 2018-08-01 ENCOUNTER — TELEPHONE (OUTPATIENT)
Dept: OTHER | Facility: CLINIC | Age: 58
End: 2018-08-01

## 2018-08-01 VITALS
HEART RATE: 56 BPM | TEMPERATURE: 98.9 F | DIASTOLIC BLOOD PRESSURE: 77 MMHG | RESPIRATION RATE: 14 BRPM | WEIGHT: 188 LBS | OXYGEN SATURATION: 97 % | BODY MASS INDEX: 32.78 KG/M2 | SYSTOLIC BLOOD PRESSURE: 114 MMHG

## 2018-08-01 DIAGNOSIS — I83.91 VARICOSE VEINS OF RIGHT LOWER EXTREMITY: ICD-10-CM

## 2018-08-01 DIAGNOSIS — F41.1 GENERALIZED ANXIETY DISORDER: ICD-10-CM

## 2018-08-01 DIAGNOSIS — G43.109 MIGRAINE WITH AURA AND WITHOUT STATUS MIGRAINOSUS, NOT INTRACTABLE: Primary | ICD-10-CM

## 2018-08-01 DIAGNOSIS — F51.01 PRIMARY INSOMNIA: ICD-10-CM

## 2018-08-01 PROCEDURE — 99214 OFFICE O/P EST MOD 30 MIN: CPT | Performed by: FAMILY MEDICINE

## 2018-08-01 RX ORDER — BUTALBITAL/ACETAMINOPHEN 50MG-325MG
TABLET ORAL
Qty: 30 TABLET | Refills: 0 | Status: SHIPPED | OUTPATIENT
Start: 2018-08-01 | End: 2019-06-17

## 2018-08-01 RX ORDER — CLONAZEPAM 0.5 MG/1
0.25-0.5 TABLET ORAL
Qty: 30 TABLET | Refills: 0 | Status: SHIPPED | OUTPATIENT
Start: 2018-08-01 | End: 2019-03-25

## 2018-08-01 ASSESSMENT — PATIENT HEALTH QUESTIONNAIRE - PHQ9
SUM OF ALL RESPONSES TO PHQ QUESTIONS 1-9: 5
10. IF YOU CHECKED OFF ANY PROBLEMS, HOW DIFFICULT HAVE THESE PROBLEMS MADE IT FOR YOU TO DO YOUR WORK, TAKE CARE OF THINGS AT HOME, OR GET ALONG WITH OTHER PEOPLE: NOT DIFFICULT AT ALL
SUM OF ALL RESPONSES TO PHQ QUESTIONS 1-9: 5

## 2018-08-01 ASSESSMENT — ANXIETY QUESTIONNAIRES
6. BECOMING EASILY ANNOYED OR IRRITABLE: SEVERAL DAYS
GAD7 TOTAL SCORE: 6
1. FEELING NERVOUS, ANXIOUS, OR ON EDGE: SEVERAL DAYS
4. TROUBLE RELAXING: SEVERAL DAYS
GAD7 TOTAL SCORE: 6
3. WORRYING TOO MUCH ABOUT DIFFERENT THINGS: SEVERAL DAYS
7. FEELING AFRAID AS IF SOMETHING AWFUL MIGHT HAPPEN: SEVERAL DAYS
2. NOT BEING ABLE TO STOP OR CONTROL WORRYING: SEVERAL DAYS
5. BEING SO RESTLESS THAT IT IS HARD TO SIT STILL: NOT AT ALL
7. FEELING AFRAID AS IF SOMETHING AWFUL MIGHT HAPPEN: SEVERAL DAYS
GAD7 TOTAL SCORE: 6

## 2018-08-01 NOTE — MR AVS SNAPSHOT
After Visit Summary   8/1/2018    Gisela Pak    MRN: [id number]           Patient Information     Date Of Birth          1960        Visit Information        Provider Department      8/1/2018 9:40 AM Mel Beavers MD [facility]        Today's Diagnoses     Varicose veins of right lower extremity    -  1    Primary insomnia        Generalized anxiety disorder        Migraine with aura and without status migrainosus, not intractable           Follow-ups after your visit        Additional Services     VASCULAR SURGERY REFERRAL       Your provider has referred you to: [facility]: [facility] [place] - [phone] or Vascular  Services - Varicose Veins & None - Please Order Appropriate Testing   [email or web address]    Please be aware that coverage of these services is subject to the terms and limitations of your health insurance plan.  Call member services at your health plan with any benefit or coverage questions.      Please bring the following with you to your appointment:    (1) Any X-Rays, CTs or MRIs which have been performed.  Contact the facility where they were done to arrange for  prior to your scheduled appointment.    (2) List of current medications   (3) This referral request   (4) Any documents/labs given to you for this referral                  Your next 10 appointments already scheduled     Aug 10, 2018  9:15 AM CDT   MA SCREENING DIGITAL BILATERAL with [id number]   [facility] ([facility])    [address] [place] [address], 2nd Floor  [facility] [address] [phone]           Do not use any powder, lotion or deodorant under your arms or on your breast. If you do, we will ask you to remove it before your exam.  Wear comfortable, two-piece clothing.  If you have any allergies, tell your care team.  Bring any previous mammograms from other facilities or have them mailed to the  "breast center. Three-dimensional (3D) mammograms are available at [place] locations in [place], [place], [place], [place], [facility], [place], [place], and [place]. [facility] locations include [place] and [facility] in [place]. Benefits of 3D mammograms include: - Improved rate of cancer detection - Decreases your chance of having to go back for more tests, which means fewer: - \"False-positive\" results (This means that there is an abnormal area but it isn't cancer.) - Invasive testing procedures, such as a biopsy or surgery - Can provide clearer images of the breast if you have dense breast tissue. 3D mammography is an optional exam that anyone can have with a 2D mammogram. It doesn't replace or take the place of a 2D mammogram. 2D mammograms remain an effective screening test for all women.  Not all insurance companies cover the cost of a 3D mammogram. Check with your insurance.            Aug 14, 2018  3:40 PM CDT   (Arrive by 3:25 PM)   New Patient Visit with Kapil Davies MD   [facility] ([facility])    [address] [address] [phone]            Aug 15, 2018  9:00 AM CDT   DX HIP/PELVIS/SPINE with [id number]   [facility] ([facility])    [address] [place] [address] [phone]           Please do not take any of the following 24 hours prior to the day of your exam: vitamins, calcium tablets, antacids.  If possible, please wear clothes without metal (snaps, zippers). A sweatsuit works well.              Who to contact     If you have questions or need follow up information about today's clinic visit or your schedule please contact [facility] directly at [phone].  Normal or non-critical lab and imaging results will be communicated to you by MyChart, letter or phone within 4 business days after the clinic has received the " results. If you do not hear from us within 7 days, please contact the clinic through [employer] or phone. If you have a critical or abnormal lab result, we will notify you by phone as soon as possible.  Submit refill requests through [employer] or call your pharmacy and they will forward the refill request to us. Please allow 3 business days for your refill to be completed.          Additional Information About Your Visit        [employer] Information     [employer] gives you secure access to your electronic health record. If you see a primary care provider, you can also send messages to your care team and make appointments. If you have questions, please call your primary care clinic.  If you do not have a primary care provider, please call [phone] and they will assist you.        Care EveryWhere ID     This is your Care EveryWhere ID. This could be used by other organizations to access your [facility] medical records  [id number]        Your Vitals Were     Pulse Temperature Respirations Last Period Pulse Oximetry BMI (Body Mass Index)    56 98.9  F (37.2  C) (Oral) 14 09/14/2011 97% 32.78 kg/m2       Blood Pressure from Last 3 Encounters:   08/01/18 114/77   03/15/18 107/73   03/15/18 122/78    Weight from Last 3 Encounters:   08/01/18 188 lb (85.3 kg)   03/15/18 187 lb (84.8 kg)   01/15/18 186 lb 8 oz (84.6 kg)              We Performed the Following     VASCULAR SURGERY REFERRAL          Where to get your medicines      Some of these will need a paper prescription and others can be bought over the counter.  Ask your nurse if you have questions.     Bring a paper prescription for each of these medications     Butalbital-Acetaminophen  MG Tabs per tablet    clonazePAM 0.5 MG tablet          Primary Care Provider Office Phone # Fax #    Mel Beavers [phone] [address] [place] [address]        Equal Access to Services     NATALIO PALACIOS AH: gosia Velazquez  alvaro brayleslie daquanabimbola napoles ah. So [facility] [phone].    ATENCIÓN: Si stefania bourgeois, tiene a craig disposición servicios gratuitos de asistencia lingüística. Ashleighame al [phone].    We comply with applicable federal civil rights laws and [place] laws. We do not discriminate on the basis of race, color, national origin, age, disability, sex, sexual orientation, or gender identity.            Thank you!     Thank you for choosing [facility]  for your care. Our goal is always to provide you with excellent care. Hearing back from our patients is one way we can continue to improve our services. Please take a few minutes to complete the written survey that you may receive in the mail after your visit with us. Thank you!             Your Updated Medication List - Protect others around you: Learn how to safely use, store and throw away your medicines at www.disposemymeds.org.          This list is accurate as of 8/1/18 10:23 AM.  Always use your most recent med list.                   Brand Name Dispense Instructions for use Diagnosis    Butalbital-Acetaminophen  MG Tabs per tablet    PHRENILIN    30 tablet    TAKE ONE OR TWO TABLETS BY MOUTH EVERY SIX HOURS AS NEEDED for headaches    Migraine with aura and without status migrainosus, not intractable       clonazePAM 0.5 MG tablet    klonoPIN    30 tablet    Take 0.5-1 tablets (0.25-0.5 mg) by mouth nightly as needed for anxiety    Primary insomnia, Generalized anxiety disorder

## 2018-08-01 NOTE — TELEPHONE ENCOUNTER
"Referral received via EPIC \"in box\", per  guidelines referral forwarded to [facility].     Elba Aldrich, KEVINN, RN    "

## 2018-08-02 ASSESSMENT — ANXIETY QUESTIONNAIRES: GAD7 TOTAL SCORE: 6

## 2018-08-02 ASSESSMENT — PATIENT HEALTH QUESTIONNAIRE - PHQ9: SUM OF ALL RESPONSES TO PHQ QUESTIONS 1-9: 5

## 2018-08-07 NOTE — TELEPHONE ENCOUNTER
FUTURE VISIT INFORMATION      FUTURE VISIT INFORMATION:    Date: 8/14/18    Time:     Location: [facility]  REFERRAL INFORMATION:    Referring provider:  self    Referring providers clinic:      Reason for visit/diagnosis  L leg injury/ pain from 2 years ago, some records at [facility], appt per pt.    RECORDS REQUESTED FROM:       Clinic name Comments Records Status Imaging Status     internal internal     Care Everywhere Care Everywhere                             RECORDS STATUS

## 2018-08-10 ENCOUNTER — RADIANT APPOINTMENT (OUTPATIENT)
Dept: MAMMOGRAPHY | Facility: CLINIC | Age: 58
End: 2018-08-10
Payer: COMMERCIAL

## 2018-08-10 DIAGNOSIS — Z12.31 VISIT FOR SCREENING MAMMOGRAM: ICD-10-CM

## 2018-08-14 ENCOUNTER — PRE VISIT (OUTPATIENT)
Dept: ORTHOPEDICS | Facility: CLINIC | Age: 58
End: 2018-08-14

## 2018-08-14 ENCOUNTER — OFFICE VISIT (OUTPATIENT)
Dept: ORTHOPEDICS | Facility: CLINIC | Age: 58
End: 2018-08-14
Payer: COMMERCIAL

## 2018-08-14 ENCOUNTER — RADIANT APPOINTMENT (OUTPATIENT)
Dept: GENERAL RADIOLOGY | Facility: CLINIC | Age: 58
End: 2018-08-14
Attending: PREVENTIVE MEDICINE
Payer: COMMERCIAL

## 2018-08-14 VITALS — BODY MASS INDEX: 32.1 KG/M2 | WEIGHT: 188 LBS | HEIGHT: 64 IN

## 2018-08-14 DIAGNOSIS — M54.16 LUMBAR RADICULAR PAIN: Primary | ICD-10-CM

## 2018-08-14 DIAGNOSIS — M54.16 LUMBAR RADICULAR PAIN: ICD-10-CM

## 2018-08-14 RX ORDER — DICLOFENAC SODIUM 75 MG/1
75 TABLET, DELAYED RELEASE ORAL 2 TIMES DAILY PRN
Qty: 30 TABLET | Refills: 1 | Status: SHIPPED | OUTPATIENT
Start: 2018-08-14 | End: 2019-03-25

## 2018-08-14 ASSESSMENT — ENCOUNTER SYMPTOMS
CHILLS: 0
HYPOTENSION: 0
WEAKNESS: 1
NUMBNESS: 0
FEVER: 0
TASTE DISTURBANCE: 0
MUSCLE WEAKNESS: 1
DIARRHEA: 0
BLOATING: 0
HEMOPTYSIS: 0
WEIGHT LOSS: 0
ORTHOPNEA: 0
SEIZURES: 0
MUSCLE CRAMPS: 1
HALLUCINATIONS: 0
SPEECH CHANGE: 0
MYALGIAS: 1
PARALYSIS: 0
SHORTNESS OF BREATH: 0
EXERCISE INTOLERANCE: 0
LOSS OF CONSCIOUSNESS: 0
MEMORY LOSS: 0
TROUBLE SWALLOWING: 0
COUGH DISTURBING SLEEP: 0
LIGHT-HEADEDNESS: 0
LEG PAIN: 1
INCREASED ENERGY: 0
NAIL CHANGES: 1
NECK MASS: 0
RECTAL PAIN: 0
SINUS CONGESTION: 1
WEIGHT GAIN: 1
HEARTBURN: 1
NIGHT SWEATS: 0
ALTERED TEMPERATURE REGULATION: 1
SYNCOPE: 0
TINGLING: 0
SKIN CHANGES: 1
PALPITATIONS: 1
POOR WOUND HEALING: 0
DECREASED APPETITE: 0
DIZZINESS: 1
JOINT SWELLING: 0
NAUSEA: 0
COUGH: 0
SLEEP DISTURBANCES DUE TO BREATHING: 0
SPUTUM PRODUCTION: 0
CONSTIPATION: 0
VOMITING: 0
WHEEZING: 0
SMELL DISTURBANCE: 0
DYSPNEA ON EXERTION: 1
JAUNDICE: 0
HOARSE VOICE: 1
DISTURBANCES IN COORDINATION: 1
BACK PAIN: 0
TREMORS: 0
NECK PAIN: 0
FATIGUE: 1
BLOOD IN STOOL: 0
SORE THROAT: 0
ARTHRALGIAS: 1
SNORES LOUDLY: 1
SINUS PAIN: 1
STIFFNESS: 1
POLYPHAGIA: 0
POLYDIPSIA: 0
BOWEL INCONTINENCE: 0
HEADACHES: 1
POSTURAL DYSPNEA: 0
HYPERTENSION: 0
ABDOMINAL PAIN: 0

## 2018-08-14 NOTE — MR AVS SNAPSHOT
"              After Visit Summary   8/14/2018    Gisela Pak    MRN: [id number]           Patient Information     Date Of Birth          1960        Visit Information        Provider Department      8/14/2018 3:40 PM Kapil Davies MD [facility]        Today's Diagnoses     Lumbar radicular pain    -  1       Follow-ups after your visit        Who to contact     Please call your clinic at [phone] to:    Ask questions about your health    Make or cancel appointments    Discuss your medicines    Learn about your test results    Speak to your doctor            Additional Information About Your Visit        MyChart Information     [employer] gives you secure access to your electronic health record. If you see a primary care provider, you can also send messages to your care team and make appointments. If you have questions, please call your primary care clinic.  If you do not have a primary care provider, please call [phone] and they will assist you.      [employer] is an electronic gateway that provides easy, online access to your medical records. With [employer], you can request a clinic appointment, read your test results, renew a prescription or communicate with your care team.     To access your existing account, please contact your [facility] or call [phone] for assistance.        Care EveryWhere ID     This is your Care EveryWhere ID. This could be used by other organizations to access your [place] medical records  [id number]        Your Vitals Were     Height Last Period BMI (Body Mass Index)             1.613 m (5' 3.5\") 09/14/2011 32.78 kg/m2          Blood Pressure from Last 3 Encounters:   08/01/18 114/77   03/15/18 107/73   03/15/18 122/78    Weight from Last 3 Encounters:   08/14/18 85.3 kg (188 lb)   08/01/18 85.3 kg (188 lb)   03/15/18 84.8 kg (187 lb)                 Today's Medication Changes          These changes are accurate as " of 8/14/18 11:59 PM.  If you have any questions, ask your nurse or doctor.               Start taking these medicines.        Dose/Directions    diclofenac 75 MG EC tablet   Commonly known as:  VOLTAREN   Used for:  Lumbar radicular pain   Started by:  Kapil Davies MD        Dose:  75 mg   Take 1 tablet (75 mg) by mouth 2 times daily as needed for moderate pain   Quantity:  30 tablet   Refills:  1       tiZANidine 4 MG tablet   Commonly known as:  ZANAFLEX   Used for:  Lumbar radicular pain   Started by:  Kapil Davies MD        Dose:  4-8 mg   Take 1-2 tablets (4-8 mg) by mouth nightly as needed   Quantity:  30 tablet   Refills:  1            Where to get your medicines      These medications were sent to [facility][id number] - [place], [place] - [address]  [address] [facility] [address]     Phone:  [phone]     diclofenac 75 MG EC tablet    tiZANidine 4 MG tablet                Primary Care Provider Office Phone # Fax #    Mel Beavers [phone] [address] [facility] [address]        Equal Access to Services     [facility] AH: Hadii pamela cerda hadasho Soomaali, waaxda luqadaha, qaybta kaalmada adeegyada, abimbola duran . So [facility] [phone].    ATENCIÓN: Si habla español, tiene a craig disposición servicios gratuitos de asistencia lingüística. [facility] [phone].    We comply with applicable federal civil rights laws and [place] laws. We do not discriminate on the basis of race, color, national origin, age, disability, sex, sexual orientation, or gender identity.            Thank you!     Thank you for choosing [facility]  for your care. Our goal is always to provide you with excellent care. Hearing back from our patients is one way we can continue to improve our services. Please take a few minutes to complete the written survey that you may receive in the mail after your visit with us. Thank you!              Your Updated Medication List - Protect others around you: Learn how to safely use, store and throw away your medicines at www.disposemymeds.org.          This list is accurate as of 8/14/18 11:59 PM.  Always use your most recent med list.                   Brand Name Dispense Instructions for use Diagnosis    Butalbital-Acetaminophen  MG Tabs per tablet    PHRENILIN    30 tablet    TAKE ONE OR TWO TABLETS BY MOUTH EVERY SIX HOURS AS NEEDED for headaches    Migraine with aura and without status migrainosus, not intractable       clonazePAM 0.5 MG tablet    klonoPIN    30 tablet    Take 0.5-1 tablets (0.25-0.5 mg) by mouth nightly as needed for anxiety    Primary insomnia, Generalized anxiety disorder       diclofenac 75 MG EC tablet    VOLTAREN    30 tablet    Take 1 tablet (75 mg) by mouth 2 times daily as needed for moderate pain    Lumbar radicular pain       tiZANidine 4 MG tablet    ZANAFLEX    30 tablet    Take 1-2 tablets (4-8 mg) by mouth nightly as needed    Lumbar radicular pain

## 2018-08-14 NOTE — PROGRESS NOTES
"HISTORY OF PRESENT ILLNESS  Ms. Pak is a pleasant 58 year old year old female who presents to clinic today with left leg pain  Gisela explains that he has not had mri  Location: low back  Quality:  achy pain    Severity: 5 of 10  Timing: occurs intermittently  Context: occurs while exercising and lifting  Modifying factors:  resting and non-use makes it better, movement and use makes it worse  Associated signs & symptoms: none  Previous similar pain: yes  Exercise: lifting weights and cardiovascular on machine  Additional history: as documented    MEDICAL HISTORY  Patient Active Problem List   Diagnosis     Disturbance of skin sensation     Tachycardia     Insomnia     Mixed urge and stress incontinence     Generalized anxiety disorder     Classical migraine     Varicose veins of legs     Ganglion of tendon sheath     Lumbago     Hip pain, left     Palpitations     Pure hypercholesterolemia       Current Outpatient Prescriptions   Medication Sig Dispense Refill     Butalbital-Acetaminophen (PHRENILIN)  MG TABS per tablet TAKE ONE OR TWO TABLETS BY MOUTH EVERY SIX HOURS AS NEEDED for headaches 30 tablet 0     clonazePAM (KLONOPIN) 0.5 MG tablet Take 0.5-1 tablets (0.25-0.5 mg) by mouth nightly as needed for anxiety 30 tablet 0       Allergies   Allergen Reactions     Lexapro [Escitalopram] Palpitations and Nausea       Family History   Problem Relation Age of Onset     Cerebrovascular Disease Maternal Grandmother      Colon Cancer Maternal Grandmother      Cerebrovascular Disease Maternal Grandfather      C.A.D. Paternal Grandfather      C.A.D. Paternal Grandmother      Breast Cancer Paternal Grandmother      Cerebrovascular Disease Mother      Other - See Comments Mother 80     churg adia syndrome     Eye Disorder Father      \"going blind\"     Coronary Artery Disease Father      CAB at 78     Breast Cancer Paternal Aunt      Breast Cancer Maternal Aunt      x3 aunts     Colon Cancer Maternal Uncle  " "      Additional medical/Social/Surgical histories reviewed in [facility] and updated as appropriate.     REVIEW OF SYSTEMS (8/14/2018)  10 point ROS of systems including Constitutional, Eyes, Respiratory, Cardiovascular, Gastroenterology, Genitourinary, Integumentary, Musculoskeletal, Psychiatric were all negative except for pertinent positives noted in my HPI.     PHYSICAL EXAM  Vitals:    08/14/18 1536   Weight: 85.3 kg (188 lb)   Height: 1.613 m (5' 3.5\")     Vital Signs: Ht 1.613 m (5' 3.5\")  Wt 85.3 kg (188 lb)  LMP 09/14/2011  BMI 32.78 kg/m2 Patient declined being weighed. Body mass index is 32.78 kg/(m^2).    General  - normal appearance, in no obvious distress  CV  - normal peripheral perfusion  Pulm  - normal respiratory pattern, non-labored  Musculoskeletal - lumbar spine  - stance: normal gait without limp, no obvious leg length discrepancy, normal heel and toe walk  - inspection: normal bone and joint alignment, no obvious scoliosis  - palpation: no paravertebral or bony tenderness  - ROM: flexion exacerbates pain, normal extension, sidebending, rotation  - strength: lower extremities 5/5 in all planes  - special tests:  (+) straight leg raise  (+) slump test  Neuro  - patellar and Achilles DTRs 2+ bilaterally, lower extremity sensory deficit throughout L5 distribution, grossly normal coordination, normal muscle tone  Skin  - no ecchymosis, erythema, warmth, or induration, no obvious rash  Psych  - interactive, appropriate, normal mood and affect    ASSESSMENT & PLAN  59 yo male with lumbar disc herniation  Reviewed xrays: shows ddd  orfered mri  F/u after mri  Consider oumar       Kapil Davies MD, [id number]  "

## 2018-08-14 NOTE — LETTER
8/14/2018       RE: Gisela Pak  [address]  [facility] [address]     Dear Colleague,    Thank you for referring your patient, Gisela Pak, to the [facility]. Please see a copy of my visit note below.    HISTORY OF PRESENT ILLNESS  Ms. Pak is a pleasant 58 year old year old female who presents to clinic today with left leg pain  Gisela explains that he has not had mri  Location: low back  Quality:  achy pain    Severity: 5 of 10  Timing: occurs intermittently  Context: occurs while exercising and lifting  Modifying factors:  resting and non-use makes it better, movement and use makes it worse  Associated signs & symptoms: none  Previous similar pain: yes  Exercise: lifting weights and cardiovascular on machine  Additional history: as documented    MEDICAL HISTORY  Patient Active Problem List   Diagnosis     Disturbance of skin sensation     Tachycardia     Insomnia     Mixed urge and stress incontinence     Generalized anxiety disorder     Classical migraine     Varicose veins of legs     Ganglion of tendon sheath     Lumbago     Hip pain, left     Palpitations     Pure hypercholesterolemia       Current Outpatient Prescriptions   Medication Sig Dispense Refill     Butalbital-Acetaminophen (PHRENILIN)  MG TABS per tablet TAKE ONE OR TWO TABLETS BY MOUTH EVERY SIX HOURS AS NEEDED for headaches 30 tablet 0     clonazePAM (KLONOPIN) 0.5 MG tablet Take 0.5-1 tablets (0.25-0.5 mg) by mouth nightly as needed for anxiety 30 tablet 0       Allergies   Allergen Reactions     Lexapro [Escitalopram] Palpitations and Nausea       Family History   Problem Relation Age of Onset     Cerebrovascular Disease Maternal Grandmother      Colon Cancer Maternal Grandmother      Cerebrovascular Disease Maternal Grandfather      VONADAMION. Paternal Grandfather      VAMSHI. Paternal Grandmother      Breast Cancer Paternal Grandmother       "Cerebrovascular Disease Mother      Other - See Comments Mother 80     churg adia syndrome     Eye Disorder Father      \"going blind\"     Coronary Artery Disease Father      CAB at 78     Breast Cancer Paternal Aunt      Breast Cancer Maternal Aunt      x3 aunts     Colon Cancer Maternal Uncle        Additional medical/Social/Surgical histories reviewed in [facility] and updated as appropriate.     REVIEW OF SYSTEMS (8/14/2018)  10 point ROS of systems including Constitutional, Eyes, Respiratory, Cardiovascular, Gastroenterology, Genitourinary, Integumentary, Musculoskeletal, Psychiatric were all negative except for pertinent positives noted in my HPI.     PHYSICAL EXAM  Vitals:    08/14/18 1536   Weight: 85.3 kg (188 lb)   Height: 1.613 m (5' 3.5\")     Vital Signs: Ht 1.613 m (5' 3.5\")  Wt 85.3 kg (188 lb)  LMP 09/14/2011  BMI 32.78 kg/m2 Patient declined being weighed. Body mass index is 32.78 kg/(m^2).    General  - normal appearance, in no obvious distress  CV  - normal peripheral perfusion  Pulm  - normal respiratory pattern, non-labored  Musculoskeletal - lumbar spine  - stance: normal gait without limp, no obvious leg length discrepancy, normal heel and toe walk  - inspection: normal bone and joint alignment, no obvious scoliosis  - palpation: no paravertebral or bony tenderness  - ROM: flexion exacerbates pain, normal extension, sidebending, rotation  - strength: lower extremities 5/5 in all planes  - special tests:  (+) straight leg raise  (+) slump test  Neuro  - patellar and Achilles DTRs 2+ bilaterally, lower extremity sensory deficit throughout L5 distribution, grossly normal coordination, normal muscle tone  Skin  - no ecchymosis, erythema, warmth, or induration, no obvious rash  Psych  - interactive, appropriate, normal mood and affect    ASSESSMENT & PLAN  57 yo male with lumbar disc herniation  Reviewed xrays: shows ddd  orfered mri  F/u after mri  Consider oumar       Again, thank you for allowing me " to participate in the care of your patient.      Sincerely,    Kapil Davies MD

## 2018-08-16 ENCOUNTER — TELEPHONE (OUTPATIENT)
Dept: ORTHOPEDICS | Facility: CLINIC | Age: 58
End: 2018-08-16

## 2018-08-16 NOTE — TELEPHONE ENCOUNTER
Katie from [facility] called and said they have initiated the PA and need the referring Provider to complete it.  [employer] [facility] sent a fax.  Requesting a call to [facility] to complete.   Gisela is scheduled for Saturday.  [phone] if questions.  Thank you.

## 2018-08-21 NOTE — TELEPHONE ENCOUNTER
Ange from [facility] is calling stating that patients insurance is requesting additional clinical information for PA for imaging MR Lumbar Spine to covered. Please contact [phone]- nia ([employer]) Patient is scheduled on 08/23/18 for appointment with [facility].    Please call Ange for any question on once complete [phone]

## 2018-08-22 NOTE — TELEPHONE ENCOUNTER
Ange is calling to follow up on status of Prior Auth. Ange states that patient has appointment tomorrow and needs to know status today. Please advise.

## 2018-08-22 NOTE — TELEPHONE ENCOUNTER
Ange was unable to be reached re: pt's prior auth. The [employer] was contacted to provide further information on the case as per Ange's request.     Familia Fletcher

## 2018-08-23 ENCOUNTER — TELEPHONE (OUTPATIENT)
Dept: ORTHOPEDICS | Facility: CLINIC | Age: 58
End: 2018-08-23

## 2018-08-23 NOTE — TELEPHONE ENCOUNTER
Called back to [facility] re: PA for MR Lumbar Spine. Message is out to Ange of [facility] that PA info was sent to [employer] (see previous phone encounter).    Familia Fletcher

## 2018-08-28 ENCOUNTER — OFFICE VISIT (OUTPATIENT)
Dept: VASCULAR SURGERY | Facility: CLINIC | Age: 58
End: 2018-08-28
Payer: COMMERCIAL

## 2018-08-28 DIAGNOSIS — Z53.9 ERRONEOUS ENCOUNTER--DISREGARD: Primary | ICD-10-CM

## 2018-08-28 PROCEDURE — 99203 OFFICE O/P NEW LOW 30 MIN: CPT | Performed by: SURGERY

## 2018-08-28 NOTE — MR AVS SNAPSHOT
After Visit Summary   8/28/2018    Gisela Pak    MRN: [id number]           Patient Information     Date Of Birth          1960        Visit Information        Provider Department      8/28/2018 3:30 PM Terrell Knight MD [facility]      Today's Diagnoses     ERRONEOUS ENCOUNTER--DISREGARD    -  1       Follow-ups after your visit        Who to contact     If you have questions or need follow up information about today's clinic visit or your schedule please contact [facility] directly at [phone].  Normal or non-critical lab and imaging results will be communicated to you by MyChart, letter or phone within 4 business days after the clinic has received the results. If you do not hear from us within 7 days, please contact the clinic through [employer] or phone. If you have a critical or abnormal lab result, we will notify you by phone as soon as possible.  Submit refill requests through [employer] or call your pharmacy and they will forward the refill request to us. Please allow 3 business days for your refill to be completed.          Additional Information About Your Visit        MyChart Information     [employer] gives you secure access to your electronic health record. If you see a primary care provider, you can also send messages to your care team and make appointments. If you have questions, please call your primary care clinic.  If you do not have a primary care provider, please call [phone] and they will assist you.        Care EveryWhere ID     This is your Care EveryWhere ID. This could be used by other organizations to access your [place] medical records  [id number]        Your Vitals Were     Last Period                   09/14/2011            Blood Pressure from Last 3 Encounters:   08/01/18 114/77   03/15/18 107/73   03/15/18 122/78    Weight from Last 3 Encounters:   08/14/18 85.3 kg  (188 lb)   08/01/18 85.3 kg (188 lb)   03/15/18 84.8 kg (187 lb)              Today, you had the following     No orders found for display       Primary Care Provider Office Phone # Fax #    Mel Beavers [phone] [address]  [facility] [address]        Equal Access to Services     [facility]: Hadii aad ku hadasho Soomaali, waaxda luqadaha, qaybta kaalmada adeegyada, waxay idiin hayaan adeeg ishan lavandanan ah. So [facility] [phone].    ATENCIÓN: Si stefania bourgeois, tiene a craig disposición servicios gratuitos de asistencia lingüística. Milena al [phone].    We comply with applicable federal civil rights laws and [place] laws. We do not discriminate on the basis of race, color, national origin, age, disability, sex, sexual orientation, or gender identity.            Thank you!     Thank you for choosing [facility]  for your care. Our goal is always to provide you with excellent care. Hearing back from our patients is one way we can continue to improve our services. Please take a few minutes to complete the written survey that you may receive in the mail after your visit with us. Thank you!             Your Updated Medication List - Protect others around you: Learn how to safely use, store and throw away your medicines at www.disposemymeds.org.          This list is accurate as of 8/28/18 11:59 PM.  Always use your most recent med list.                   Brand Name Dispense Instructions for use Diagnosis    Butalbital-Acetaminophen  MG Tabs per tablet    PHRENILIN    30 tablet    TAKE ONE OR TWO TABLETS BY MOUTH EVERY SIX HOURS AS NEEDED for headaches    Migraine with aura and without status migrainosus, not intractable       clonazePAM 0.5 MG tablet    klonoPIN    30 tablet    Take 0.5-1 tablets (0.25-0.5 mg) by mouth nightly as needed for anxiety    Primary insomnia, Generalized anxiety disorder       diclofenac 75 MG EC tablet    VOLTAREN    30  tablet    Take 1 tablet (75 mg) by mouth 2 times daily as needed for moderate pain    Lumbar radicular pain       tiZANidine 4 MG tablet    ZANAFLEX    30 tablet    Take 1-2 tablets (4-8 mg) by mouth nightly as needed    Lumbar radicular pain
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
## 2018-09-08 ENCOUNTER — TRANSFERRED RECORDS (OUTPATIENT)
Dept: HEALTH INFORMATION MANAGEMENT | Facility: CLINIC | Age: 58
End: 2018-09-08

## 2018-09-18 ENCOUNTER — OFFICE VISIT (OUTPATIENT)
Dept: VASCULAR SURGERY | Facility: CLINIC | Age: 58
End: 2018-09-18
Payer: COMMERCIAL

## 2018-09-18 ENCOUNTER — APPOINTMENT (OUTPATIENT)
Dept: VASCULAR SURGERY | Facility: CLINIC | Age: 58
End: 2018-09-18
Payer: COMMERCIAL

## 2018-09-18 DIAGNOSIS — Z53.9 ERRONEOUS ENCOUNTER--DISREGARD: Primary | ICD-10-CM

## 2018-09-18 PROCEDURE — 99214 OFFICE O/P EST MOD 30 MIN: CPT | Performed by: SURGERY

## 2018-09-18 PROCEDURE — 93971 EXTREMITY STUDY: CPT | Performed by: SURGERY

## 2018-09-18 NOTE — MR AVS SNAPSHOT
After Visit Summary   9/18/2018    Gisela Pak    MRN: [id number]           Patient Information     Date Of Birth          1960        Visit Information        Provider Department      9/18/2018 3:00 PM Terrell Knight MD [facility]      Today's Diagnoses     ERRONEOUS ENCOUNTER--DISREGARD    -  1       Follow-ups after your visit        Your next 10 appointments already scheduled     Nov 20, 2018  1:30 PM CST   Return Visit with Terrell Knight MD   [facility] ([facility])    [address]  [facility] [address] [phone]              Who to contact     If you have questions or need follow up information about today's clinic visit or your schedule please contact [facility] directly at [phone].  Normal or non-critical lab and imaging results will be communicated to you by MyChart, letter or phone within 4 business days after the clinic has received the results. If you do not hear from us within 7 days, please contact the clinic through [employer] or phone. If you have a critical or abnormal lab result, we will notify you by phone as soon as possible.  Submit refill requests through [employer] or call your pharmacy and they will forward the refill request to us. Please allow 3 business days for your refill to be completed.          Additional Information About Your Visit        MyChart Information     [employer] gives you secure access to your electronic health record. If you see a primary care provider, you can also send messages to your care team and make appointments. If you have questions, please call your primary care clinic.  If you do not have a primary care provider, please call [phone] and they will assist you.        Care EveryWhere ID     This is your Care EveryWhere ID. This could be used by other organizations  to access your [facility] medical records  [id number]        Your Vitals Were     Last Period                   09/14/2011            Blood Pressure from Last 3 Encounters:   08/01/18 114/77   03/15/18 107/73   03/15/18 122/78    Weight from Last 3 Encounters:   08/14/18 85.3 kg (188 lb)   08/01/18 85.3 kg (188 lb)   03/15/18 84.8 kg (187 lb)              Today, you had the following     No orders found for display       Primary Care Provider Office Phone # Fax #    Mel Beavers [phone]       [address]  [facility] [address]        Equal Access to Services     NATALIO PALACIOS : Neymar Villarreal, gosia bray, alvaro call, abimbola crocker. So [facility] [phone].    ATENCIÓN: Si habla español, tiene a craig disposición servicios gratuitos de asistencia lingüística. Llame al [phone].    We comply with applicable federal civil rights laws and [place] laws. We do not discriminate on the basis of race, color, national origin, age, disability, sex, sexual orientation, or gender identity.            Thank you!     Thank you for choosing [facility]  for your care. Our goal is always to provide you with excellent care. Hearing back from our patients is one way we can continue to improve our services. Please take a few minutes to complete the written survey that you may receive in the mail after your visit with us. Thank you!             Your Updated Medication List - Protect others around you: Learn how to safely use, store and throw away your medicines at www.disposemymeds.org.          This list is accurate as of 9/18/18 11:59 PM.  Always use your most recent med list.                   Brand Name Dispense Instructions for use Diagnosis    Butalbital-Acetaminophen  MG Tabs per tablet    PHRENILIN    30 tablet    TAKE ONE OR TWO TABLETS BY MOUTH EVERY SIX HOURS AS NEEDED for headaches    Migraine with aura and  without status migrainosus, not intractable       clonazePAM 0.5 MG tablet    klonoPIN    30 tablet    Take 0.5-1 tablets (0.25-0.5 mg) by mouth nightly as needed for anxiety    Primary insomnia, Generalized anxiety disorder       diclofenac 75 MG EC tablet    VOLTAREN    30 tablet    Take 1 tablet (75 mg) by mouth 2 times daily as needed for moderate pain    Lumbar radicular pain       tiZANidine 4 MG tablet    ZANAFLEX    30 tablet    Take 1-2 tablets (4-8 mg) by mouth nightly as needed    Lumbar radicular pain

## 2018-09-19 NOTE — PROGRESS NOTES
[facility] Office Note    Gisela Pak returns in follow-up of right leg pain, varicose veins and significant pruritus of her right leg.  Please see my consultation of 8/28/2018 for details.  She returned today for right lower extremity venous duplex ultrasound.    Ultrasound of her right lower extremity deep veins reveals no evidence of deep vein thrombosis.  Her right common femoral vein is incompetent but the remaining deep vein valves are competent.  Her right great saphenous vein is incompetent from the saphenofemoral junction to the proximal thigh and again at the level of the knee with reflux time of 8.3 seconds.  The vein measures 4.5 mm in diameter and is the source of 5.1 mm and 4.6 mm incompetent vein branches with reflux times greater than 500 ms from the knee to the ankle.  The right small saphenous vein, anterior accessory saphenous vein are competent.  The Vein of Giacomini is not visualized.  There is a 3.1 mm diameter incompetent perforating vein communicating with the small saphenous vein.    Physical exam  General: Pleasant female in no acute distress.  Extremities: She has varicosities measuring between 4 and 6 mm coursing down the medial right calf, right pretibial area to the distal leg and ankle.  There is a non-erythematous dermatitis/irritation of the skin overlying these veins in the right pretibial area, the area of her intense pruritus.    Impression  CEAP 2 chronic venous insufficiency with a mild dermatitis which should classify her as a CEAP 4 chronic venous insufficiency.  This is likely related to her right great saphenous vein incompetence and sizable incompetent tributaries on her right anterior leg.    We discussed options of continued conservative management with use of compression hose, leg elevation, exercise, dietary measures and oral analgesics on an as-needed basis.  She has been pursuing these measures with compression hose over the last month but is not seeing  significant change in her symptoms.  She will  continue conservative management for an appropriate period of time and then we will reassess her.    If conservative management does not adequately control symptoms, she is a candidate for endovenous ablation of the right great saphenous vein with or without phlebectomies.  Details of procedure including risks of bleeding, infection, nerve injury, scarring, hyperpigmentation, deep vein thrombosis, recanalization of the great saphenous veins were all discussed.  She knows that pursuing delayed phlebectomy or sclerotherapy for the tributaries is also an option for her.  She would lean toward having it all treated at the same setting.  Given her dermatitis, phlebectomies would be considered medically necessary and and covered by insurance.    We will see her in about 2 months in follow-up.    RHIANNON Knight MD

## 2018-09-21 ENCOUNTER — TELEPHONE (OUTPATIENT)
Dept: ORTHOPEDICS | Facility: CLINIC | Age: 58
End: 2018-09-21

## 2018-09-26 NOTE — TELEPHONE ENCOUNTER
[facility]    Phone Message: Second Call    May a detailed message be left on voicemail: yes    Reason for Call: Requesting Results   Name/type of test: MRI LUMBAR SPINE  IMAGES IN PACS NOW  Date of test:  9/8/2018  Was test done at a location other than [facility] (Please fill in the location if not [facility])?: Yes: [facility]    At visit with Dr Davies, she was told just to let him know when MRI was done and he'd review it.  She is checking in to see if he has had a chance to review MRI images and thoughts on plan of care. She does continue to have right leg pain- back pain is doing well. She appreciates his time and care.      Action Taken: Message routed to:  [facility] ([facility] Orthopedics

## 2018-10-08 ENCOUNTER — TELEPHONE (OUTPATIENT)
Dept: FAMILY MEDICINE | Facility: CLINIC | Age: 58
End: 2018-10-08

## 2018-10-08 NOTE — TELEPHONE ENCOUNTER
[facility]    Phone Message    May a detailed message be left on voicemail: yes    Reason for Call: Other: Pt still awaiting results of MRI. It has been more than two weeks and no contact. Please call pt ASAP to discuss.     Action Taken: Message routed to:  [facility] Orthopedics

## 2018-10-08 NOTE — TELEPHONE ENCOUNTER
Reason for Call:  Other call back    Detailed comments: Pt called and would like Dr Beavers to take a look at the MRI. The provider that ordered it wont get back to her. She is in a lot of pain and would like to know what the next steps are. And what the diagnosis was. Dr. Davies was the provider who ordered it but he never got back to her and wont return her phone calls. Please Advise thank you    Phone Number Patient can be reached at: Home number on file [phone] (home)    Best Time: anytime    Can we leave a detailed message on this number? YES    Call taken on 10/8/2018 at 1:15 PM by Adina Martins

## 2018-10-08 NOTE — TELEPHONE ENCOUNTER
Writer called [facility] and spoke with Byron who stated he will make sure staff will contact patient regarding MRI results and follow up plan.    Writer left detailed message for patient informing her [facility] will follow up with her regarding MRI results and follow up plan. Encouraged patient to call orthopedic clinic tomorrow if has not heard from by end of day today.    KEVIN VillafanaN, RN

## 2018-10-23 ENCOUNTER — TRANSFERRED RECORDS (OUTPATIENT)
Dept: HEALTH INFORMATION MANAGEMENT | Facility: CLINIC | Age: 58
End: 2018-10-23

## 2018-12-11 ENCOUNTER — ANCILLARY PROCEDURE (OUTPATIENT)
Dept: BONE DENSITY | Facility: CLINIC | Age: 58
End: 2018-12-11
Attending: FAMILY MEDICINE
Payer: COMMERCIAL

## 2018-12-11 DIAGNOSIS — Z78.0 ASYMPTOMATIC POSTMENOPAUSAL STATUS: ICD-10-CM

## 2018-12-11 PROCEDURE — 77080 DXA BONE DENSITY AXIAL: CPT | Performed by: INTERNAL MEDICINE

## 2018-12-14 PROBLEM — M85.80 OSTEOPENIA: Status: ACTIVE | Noted: 2018-12-14

## 2018-12-14 NOTE — RESULT ENCOUNTER NOTE
Gray Higgins,  The result of your recent DEXA scan is abnormal.  The density of your bones is thinner than expected. This is called osteopenia when there is mild to moderate thinning and osteoporosis when there  is moderate to severe thinning. This may put you at risk for a fracture.    You have mild osteopenia.    To help prevent further loss of bone, the following is recommended:    Take in adequate amounts of Calcium and Vitamin D. These are the building blocks for bones. Most women will need 1500mg of Calcium and 1000 international units of Vitamin D daily.  It is best to get your calcium through your diet; if you get 3 servings of dairy products daily you should not need calcium supplements.      Exercise daily to keep your bones strong. Thirty minutes of moderate walking or other aerobic activity is recommended.    If you are a smoker, make an appointment to discuss smoking cessation.    I do not recommend prescription bone-preserving medication at this time, but we should continue to monitor this with a repeat DEXA scan every 5 years.    If you have any questions or concerns, please schedule an appointment with me to further discuss these results.    Mel Beavers MD

## 2018-12-27 ENCOUNTER — TRANSFERRED RECORDS (OUTPATIENT)
Dept: HEALTH INFORMATION MANAGEMENT | Facility: CLINIC | Age: 58
End: 2018-12-27

## 2019-03-23 NOTE — PROGRESS NOTES
SUBJECTIVE:  Gisela Pak, a 59 year old female, is here to discuss the following issues:     Anxiety/Insomnia Follow-Up    She continues to take clonazepam prn with no problems or noted side effects.  She uses this sparingly.  She last received #30 tabs nearly 9 months ago.     Status since last visit: No change    Other associated symptoms:None    PHQ 6/5/2017 1/15/2018 8/1/2018   PHQ-9 Total Score 7 5 5   Q9: Suicide Ideation Not at all Not at all Not at all     KIRSTIN-7 SCORE 1/15/2018 8/1/2018 3/25/2019   Total Score - - -   Total Score - 6 (mild anxiety) -   Total Score 6 6 9     Left Hip and Thigh Pain Follow-Up   Persistent posterior and lateral thigh pain.  Saw Dr. Dahl who felt pain was arising from back - not hip - and referred her to his spine colleague.  She then saw Dr. Arango who reviewed her lumbar spine MRI from June 2018 and felt the pain was unlikely to be arising from her back.  He did not feel it was radicular and his impression was that the pain was arising from her hip - myofascial pain and left hip flexor tightness.  She became very frustrated by this.       Abdominal Pain  Chronic, dull ache - usually mild  Usually LUQ but sometimes in RUQ  Not usually related to meals but one time after eating a rich meal the RUQ pain radiated into right scapular region  No associated nausea, eructation.  Not positional.    Problem list and histories reviewed & updated, as indicated.  Patient Active Problem List   Diagnosis     Disturbance of skin sensation     Tachycardia     Insomnia     Mixed urge and stress incontinence     Generalized anxiety disorder     Classical migraine     Varicose veins of legs     Ganglion of tendon sheath     Lumbago     Hip pain, left     Palpitations     Pure hypercholesterolemia     Osteopenia       BP Readings from Last 3 Encounters:   03/25/19 118/82   08/01/18 114/77   03/15/18 107/73    Wt Readings from Last 3 Encounters:   03/25/19 86.3 kg (190 lb 4 oz)   08/14/18  "85.3 kg (188 lb)   08/01/18 85.3 kg (188 lb)           ROS:  CONST: NEGATIVE for fevers/chills, unexplained weight loss/gain, and fatigue and POSITIVE for sweats  EYES: NEGATIVE for change in vision  ENT: NEGATIVE for difficulty hearing/tinnitus, and problems with teeth/gums  BREAST: NEGATIVE for breast lump/discharge  CV: NEGATIVE for chest pain/discomfort, leg pain with exercise, and palpitations  RESP: NEGATIVE for cough/wheeze, and difficulty breathing  GI: NEGATIVE for blood in bowel movement, and nausea/vomiting/diarrhea  : NEGATIVE for nighttime urination, leaking urine, unusual vaginal bleeding, vaginal discharge, and concerns about sexual function  SKIN: NEGATIVE for rash or mole change  NEURO: POSITIVE for headaches (stable) and numbness in right upper back (chronic), NEGATIVE for dizziness/lightheadedness, memory loss, and loss of coordination  PSYCH: POSITIVE for anxiety/stress, problems with sleep, and NEGATIVE for depression  HEME: NEGATIVE for unexplained lumps, and easy bruising/bleeding  ENDO: NEGATIVE for excessive thirst or urination  ALL: NEGATIVE for hay fever/allergies     OBJECTIVE:    /82 (BP Location: Right arm, Patient Position: Sitting, Cuff Size: Adult Large)   Pulse 86   Temp 99.1  F (37.3  C) (Oral)   Resp 15   Ht 1.613 m (5' 3.5\")   Wt 86.3 kg (190 lb 4 oz)   LMP 09/14/2011   SpO2 97%   Breastfeeding? No   BMI 33.17 kg/m    GEN:  no apparent distress  EYES: sclerae and conjunctivae clear with no discharge   LUNGS:  normal respiratory effort, and lungs clear to auscultation bilaterally - no rales, rhonchi or wheezes  CV: regular rate and rhythm, normal S1 S2, no S3 or S4 and no murmur, click or rub  ABD:  soft, nontender, no mass, no hepatosplenomegaly, no hernias   SKIN: Clear. No significant rash, abnormal pigmentation or lesions     ASSESSMENT/PLAN:  1. Primary insomnia  2. Generalized anxiety disorder  I renewed her clonazepam and reviewed goal for ongoing sparing " use given risk of tolerance/dependence if used on a regular basis.   - clonazePAM (KLONOPIN) 0.5 MG tablet; Take 0.5-1 tablets (0.25-0.5 mg) by mouth nightly as needed for anxiety  Dispense: 30 tablet; Refill: 0    3. Upper abdominal pain  Unclear etiology with relatively mild symptoms.  I recommended she monitor/track pain pattern including any precipitating or alleviating factors.  Notify me of any worsening.  I am somewhat concerned about possible cholelithiasis given the one episode of post-prandial RUQ pain radiating to right scapula.  I discussed biliary colic and advised her to notify me of any recurrence of this type of pain.      Return to Clinic in 6 months      Mel Beavers MD   [facility]

## 2019-03-25 ENCOUNTER — OFFICE VISIT (OUTPATIENT)
Dept: FAMILY MEDICINE | Facility: CLINIC | Age: 59
End: 2019-03-25
Payer: COMMERCIAL

## 2019-03-25 VITALS
SYSTOLIC BLOOD PRESSURE: 118 MMHG | DIASTOLIC BLOOD PRESSURE: 82 MMHG | OXYGEN SATURATION: 97 % | HEART RATE: 86 BPM | TEMPERATURE: 99.1 F | RESPIRATION RATE: 15 BRPM | HEIGHT: 64 IN | BODY MASS INDEX: 32.48 KG/M2 | WEIGHT: 190.25 LBS

## 2019-03-25 DIAGNOSIS — F41.1 GENERALIZED ANXIETY DISORDER: ICD-10-CM

## 2019-03-25 DIAGNOSIS — R10.10 UPPER ABDOMINAL PAIN: Primary | ICD-10-CM

## 2019-03-25 DIAGNOSIS — F51.01 PRIMARY INSOMNIA: ICD-10-CM

## 2019-03-25 PROCEDURE — 99214 OFFICE O/P EST MOD 30 MIN: CPT | Performed by: FAMILY MEDICINE

## 2019-03-25 RX ORDER — OMEGA-3 FATTY ACIDS/FISH OIL 300-1000MG
CAPSULE ORAL
COMMUNITY
End: 2021-01-12

## 2019-03-25 RX ORDER — CALCIUM CARBONATE 750 MG/1
750 TABLET, CHEWABLE ORAL DAILY
COMMUNITY
End: 2021-01-12

## 2019-03-25 RX ORDER — MULTIVITAMIN WITH IRON
1 TABLET ORAL DAILY
COMMUNITY

## 2019-03-25 RX ORDER — CLONAZEPAM 0.5 MG/1
0.25-0.5 TABLET ORAL
Qty: 30 TABLET | Refills: 0 | Status: SHIPPED | OUTPATIENT
Start: 2019-03-25 | End: 2020-01-23

## 2019-03-25 ASSESSMENT — ANXIETY QUESTIONNAIRES
1. FEELING NERVOUS, ANXIOUS, OR ON EDGE: SEVERAL DAYS
2. NOT BEING ABLE TO STOP OR CONTROL WORRYING: SEVERAL DAYS
IF YOU CHECKED OFF ANY PROBLEMS ON THIS QUESTIONNAIRE, HOW DIFFICULT HAVE THESE PROBLEMS MADE IT FOR YOU TO DO YOUR WORK, TAKE CARE OF THINGS AT HOME, OR GET ALONG WITH OTHER PEOPLE: NOT DIFFICULT AT ALL
5. BEING SO RESTLESS THAT IT IS HARD TO SIT STILL: SEVERAL DAYS
6. BECOMING EASILY ANNOYED OR IRRITABLE: MORE THAN HALF THE DAYS
7. FEELING AFRAID AS IF SOMETHING AWFUL MIGHT HAPPEN: MORE THAN HALF THE DAYS
GAD7 TOTAL SCORE: 9
3. WORRYING TOO MUCH ABOUT DIFFERENT THINGS: SEVERAL DAYS

## 2019-03-25 ASSESSMENT — PATIENT HEALTH QUESTIONNAIRE - PHQ9: 5. POOR APPETITE OR OVEREATING: SEVERAL DAYS

## 2019-03-25 ASSESSMENT — MIFFLIN-ST. JEOR: SCORE: 1420.03

## 2019-03-26 ASSESSMENT — ANXIETY QUESTIONNAIRES: GAD7 TOTAL SCORE: 9

## 2019-05-16 ENCOUNTER — HEALTH MAINTENANCE LETTER (OUTPATIENT)
Age: 59
End: 2019-05-16

## 2019-06-04 ENCOUNTER — OFFICE VISIT (OUTPATIENT)
Dept: FAMILY MEDICINE | Facility: CLINIC | Age: 59
End: 2019-06-04
Payer: COMMERCIAL

## 2019-06-04 VITALS
HEIGHT: 63 IN | WEIGHT: 187.5 LBS | DIASTOLIC BLOOD PRESSURE: 86 MMHG | TEMPERATURE: 99.6 F | SYSTOLIC BLOOD PRESSURE: 132 MMHG | HEART RATE: 90 BPM | OXYGEN SATURATION: 96 % | BODY MASS INDEX: 33.22 KG/M2

## 2019-06-04 DIAGNOSIS — J06.9 UPPER RESPIRATORY TRACT INFECTION, UNSPECIFIED TYPE: Primary | ICD-10-CM

## 2019-06-04 PROCEDURE — 99213 OFFICE O/P EST LOW 20 MIN: CPT | Performed by: FAMILY MEDICINE

## 2019-06-04 ASSESSMENT — MIFFLIN-ST. JEOR: SCORE: 1399.62

## 2019-06-04 NOTE — PATIENT INSTRUCTIONS
-Afrin nasal spray (oxymetazolin) twice per day for 5 days.      - plenty of water.     If symptoms are getting worse - follow up.     Allergy meds - generic version of allegra, zyrtec.

## 2019-06-04 NOTE — PROGRESS NOTES
"Subjective     Gisela Pak is a 59 year old female who presents to clinic today for the following health issues:    HPI   Eye(s) Problem  Onset: 1 day     Description:   Location: right  Pain: no   Redness: no     Accompanying Signs & Symptoms:  Discharge/mattering: no   Swelling: YES  Visual changes: YES  Fever: no   Nasal Congestion: YES  Bothered by bright lights: YES    History:   Trauma: no   Foreign body exposure: no     Precipitating factors:   Wearing contacts: no     Alleviating factors:  Improved by: Nothing     Therapies Tried and outcome: ice no help     Last time similar issue and had shingles.  First symptoms was swelling on left side at that time.   No throbbing pain right now which was present last time.   No contacts.   Started with cold symptoms and throat discomfort.       Social History     Occupational History     Occupation: law   Tobacco Use     Smoking status: Never Smoker     Smokeless tobacco: Never Used     Tobacco comment: a little in college   Substance and Sexual Activity     Alcohol use: Yes     Alcohol/week: 0.0 oz     Comment: very rare     Drug use: No     Sexual activity: Yes     Partners: Male     Birth control/protection: Condom     Allergies   Allergen Reactions     Lexapro [Escitalopram] Palpitations and Nausea     Patient Active Problem List   Diagnosis     Disturbance of skin sensation     Tachycardia     Insomnia     Mixed urge and stress incontinence     Generalized anxiety disorder     Classical migraine     Varicose veins of legs     Ganglion of tendon sheath     Lumbago     Hip pain, left     Palpitations     Pure hypercholesterolemia     Osteopenia     Reviewed medications, social history and  past medical and surgical history.    Review of system: for general, respiratory, CVS, GI and psychiatry negative except for noted above.     EXAM:  /86   Pulse 90   Temp 99.6  F (37.6  C) (Oral)   Ht 1.6 m (5' 3\")   Wt 85 kg (187 lb 8 oz)   LMP 09/14/2011   SpO2 " 96%   BMI 33.21 kg/m    Constitutional: healthy, alert and no distress   Psychiatric: mentation appears normal and affect normal/bright  Cardiovascular: RRR. No murmurs,  Respiratory: negative, Lungs clear. No crackles or wheezing. No tachypnea.   Head: Normocephalic. No masses, lesions, tenderness or abnormalities  Neck: Neck supple. No adenopathy.    ENT: Both TM exam normal, mild frontal sinus tenderness, mild nasal turbinate hypertrophy, throat clear  Both eyes examined: PERRLA.  No conjunctival erythema.  Right upper external eyelid area mild fullness without any skin color changes.    ASSESSMENT / PLAN:  (J06.9) Upper respiratory tract infection, unspecified type  (primary encounter diagnosis)  Comment: Viral versus allergic.  No history of allergy.  Some sinus symptoms.  She has a history of shingles and worried about it.  Current exam and symptoms are not consistent with the same.  Plan: Continue symptomatic treatment.  We discussed about keeping a low threshold and follow-up if swelling is not improving.  Apply cold compression.  She is planning to get shingles vaccine.      Return for follow up - as needed and yearly..      The above note was dictated using voice recognition. Although reviewed after completion, some word and grammatical error may remain .      Patient Instructions   -Afrin nasal spray (oxymetazolin) twice per day for 5 days.      - plenty of water.     If symptoms are getting worse - follow up.     Allergy meds - generic version of allegra, zyrtec.

## 2019-06-17 DIAGNOSIS — G43.109 MIGRAINE WITH AURA AND WITHOUT STATUS MIGRAINOSUS, NOT INTRACTABLE: ICD-10-CM

## 2019-06-17 NOTE — TELEPHONE ENCOUNTER
BUTALBITAL-ACETAMINOPHE  TABS      Last Written Prescription Date:  8/1/2018  Last Fill Quantity: 30 tablet,   # refills: 0  Last Office Visit: 6/4/2019  Future Office visit:       Routing refill request to provider for review/approval because:  Drug not on the [facility], [facility] or [facility] refill protocol or controlled substance

## 2019-06-19 RX ORDER — BUTALBITAL/ACETAMINOPHEN 50MG-325MG
TABLET ORAL
Qty: 30 TABLET | Refills: 0 | Status: SHIPPED | OUTPATIENT
Start: 2019-06-19 | End: 2019-09-04

## 2019-06-19 NOTE — TELEPHONE ENCOUNTER
Walked rx to [facility] pharmacy.  LM to inform pt that this rx is available for  this afternoon.  EBEN Reyna

## 2019-06-19 NOTE — TELEPHONE ENCOUNTER
Patient calling to find out status of this refill.  Has 2 pills left.  Going out of town tomorrow.  Please call when done or  if you have any questions.  OK to LM on VM

## 2019-06-19 NOTE — TELEPHONE ENCOUNTER
I refilled.  Script is in [facility].  Please walk to pharmacy and let her know as she is going to leave town.  Mel Beavers MD

## 2019-06-19 NOTE — TELEPHONE ENCOUNTER
Sending to  PCP.  Would you want an Evist with pt?  8/1/18 was last rx.    Nothing mentioned in last OV dated 6/4/19 (acute) and 3/25/19.  EBEN Reyna

## 2019-08-30 ENCOUNTER — ALLIED HEALTH/NURSE VISIT (OUTPATIENT)
Dept: NURSING | Facility: CLINIC | Age: 59
End: 2019-08-30
Payer: COMMERCIAL

## 2019-08-30 DIAGNOSIS — Z23 NEED FOR TD VACCINE: Primary | ICD-10-CM

## 2019-08-30 PROCEDURE — 90714 TD VACC NO PRESV 7 YRS+ IM: CPT

## 2019-08-30 PROCEDURE — 90471 IMMUNIZATION ADMIN: CPT

## 2019-08-30 NOTE — NURSING NOTE
Prior to immunization administration, verified patients identity using patient s name and date of birth. Please see Immunization Activity for additional information.     Screening Questionnaire for Adult Immunization    Are you sick today?   No   Do you have allergies to medications, food, a vaccine component or latex?   No   Have you ever had a serious reaction after receiving a vaccination?   No   Do you have a long-term health problem with heart disease, lung disease, asthma, kidney disease, metabolic disease (e.g. diabetes), anemia, or other blood disorder?   No   Do you have cancer, leukemia, HIV/AIDS, or any other immune system problem?   No   In the past 3 months, have you taken medications that affect  your immune system, such as prednisone, other steroids, or anticancer drugs; drugs for the treatment of rheumatoid arthritis, Crohn s disease, or psoriasis; or have you had radiation treatments?   No   Have you had a seizure, or a brain or other nervous system problem?   No   During the past year, have you received a transfusion of blood or blood     products, or been given immune (gamma) globulin or antiviral drug?   No   For women: Are you pregnant or is there a chance you could become        pregnant during the next month?   No   Have you received any vaccinations in the past 4 weeks?   Yes     Immunization questionnaire was positive for at least one answer.  Notified [facility].        Per orders of Dr. Rayr injection of Tenvac given by Jenifer Antonio CMA. Patient instructed to remain in clinic for 15 minutes afterwards, and to report any adverse reaction to me immediately.       Screening performed by Jenifer Antonio CMA on 8/30/2019 at 2:18 PM.

## 2019-09-03 ENCOUNTER — ANCILLARY PROCEDURE (OUTPATIENT)
Dept: MAMMOGRAPHY | Facility: CLINIC | Age: 59
End: 2019-09-03
Attending: FAMILY MEDICINE
Payer: COMMERCIAL

## 2019-09-03 DIAGNOSIS — Z12.31 VISIT FOR SCREENING MAMMOGRAM: ICD-10-CM

## 2019-09-04 ENCOUNTER — OFFICE VISIT (OUTPATIENT)
Dept: FAMILY MEDICINE | Facility: CLINIC | Age: 59
End: 2019-09-04
Payer: COMMERCIAL

## 2019-09-04 VITALS
TEMPERATURE: 98.7 F | OXYGEN SATURATION: 99 % | WEIGHT: 183 LBS | SYSTOLIC BLOOD PRESSURE: 124 MMHG | RESPIRATION RATE: 14 BRPM | DIASTOLIC BLOOD PRESSURE: 82 MMHG | HEART RATE: 78 BPM | BODY MASS INDEX: 32.42 KG/M2

## 2019-09-04 DIAGNOSIS — R22.9 LOCALIZED SUPERFICIAL SWELLING, MASS, OR LUMP: Primary | ICD-10-CM

## 2019-09-04 DIAGNOSIS — G43.009 MIGRAINE WITHOUT AURA AND WITHOUT STATUS MIGRAINOSUS, NOT INTRACTABLE: ICD-10-CM

## 2019-09-04 PROCEDURE — 99214 OFFICE O/P EST MOD 30 MIN: CPT | Performed by: FAMILY MEDICINE

## 2019-09-04 RX ORDER — BUTALBITAL, ACETAMINOPHEN AND CAFFEINE 50; 325; 40 MG/1; MG/1; MG/1
1 TABLET ORAL EVERY 4 HOURS PRN
Qty: 30 TABLET | Refills: 0 | Status: SHIPPED | OUTPATIENT
Start: 2019-09-04 | End: 2020-05-22

## 2019-09-04 NOTE — PATIENT INSTRUCTIONS
Monitor the lump and if it increases in size schedule the ultrasound.  Otherwise wait a couple weeks.  If it starts to shrink follow it and see if it resolves.  If it does not shrink or improve after a couple weeks go ahead and schedule the ultrasound.      Call [facility] [phone] to schedule ultrasound.

## 2019-09-04 NOTE — PROGRESS NOTES
Subjective     Gisela Pak is a 59 year old female who presents to clinic today for the following health issues:    HPI   Lump      Duration: x 1 week    Description (location/character/radiation): right clavicle     Intensity:  4/10    Accompanying signs and symptoms: Moves under skin, somewhat tender, no redness    History (similar episodes/previous evaluation): Lumps on legs before    Precipitating or alleviating factors: Sleeping on right side irritates    Therapies tried and outcome: None     She had Adacel injection in right deltoid a week ago.  She noticed the lump overlying her right clavicle around that time.  No breast changes.  She just had normal mammogram yesterday.      BP Readings from Last 3 Encounters:   09/04/19 124/82   06/04/19 132/86   03/25/19 118/82    Wt Readings from Last 3 Encounters:   09/04/19 83 kg (183 lb)   06/04/19 85 kg (187 lb 8 oz)   03/25/19 86.3 kg (190 lb 4 oz)              Reviewed and updated as needed this visit by Provider  Meds  Problems         Review of Systems   Breast/Skin: negative       Objective    /82 (BP Location: Right arm, Patient Position: Sitting, Cuff Size: Adult Regular)   Pulse 78   Temp 98.7  F (37.1  C) (Tympanic)   Resp 14   Wt 83 kg (183 lb)   LMP 09/14/2011   SpO2 99%   Breastfeeding? No   BMI 32.42 kg/m    Body mass index is 32.42 kg/m .  Physical Exam   GEN:  no apparent distress  EYES: sclerae and conjunctivae clear with no discharge   ENT: external ears and nose without lesions or scars   BREASTS: normal without masses, tenderness or nipple discharge and no palpable axillary masses or adenopathy  SKIN: small, firm, mobile subcutaneous lump approximately pea-sized which overlies the middle of her right clavicle.  No erythema or warmth.  LYMPH: No supraclavicular or cervical nodes.      Diagnostic Test Results:  Labs reviewed in Epic        Assessment & Plan     1. Localized superficial swelling, mass, or lump  Possibly a dermal  inclusion cyst but I suspect this may be a reactive node. Given the location we will need to monitor this closely.  I did put in order for soft tissue ultrasound of the area to be scheduled if lump enlarges or does not start to diminish in size in the next 2 weeks.    - US Extremity Non Vascular Right; Future    2. Migraine without aura and without status migrainosus, not intractable  She'd like to go back to butalbital-acetaminophen with codeine.  We discussed that migraine management is best if caffeine is avoided on a regular basis and used only at onset of headaches.  - butalbital-acetaminophen-caffeine (FIORICET/ESGIC) -40 MG tablet; Take 1 tablet by mouth every 4 hours as needed for headaches  Dispense: 30 tablet; Refill: 0        Patient Instructions   Monitor the lump and if it increases in size schedule the ultrasound.  Otherwise wait a couple weeks.  If it starts to shrink follow it and see if it resolves.  If it does not shrink or improve after a couple weeks go ahead and schedule the ultrasound.      Call [facility] Scheduling [phone] to schedule ultrasound.        No follow-ups on file.    Mel Beavers MD  [facility]

## 2019-10-01 ENCOUNTER — HEALTH MAINTENANCE LETTER (OUTPATIENT)
Age: 59
End: 2019-10-01

## 2019-10-28 ENCOUNTER — ALLIED HEALTH/NURSE VISIT (OUTPATIENT)
Dept: NURSING | Facility: CLINIC | Age: 59
End: 2019-10-28
Payer: COMMERCIAL

## 2019-10-28 DIAGNOSIS — Z23 NEED FOR PROPHYLACTIC VACCINATION AND INOCULATION AGAINST INFLUENZA: Primary | ICD-10-CM

## 2019-10-28 PROCEDURE — 90471 IMMUNIZATION ADMIN: CPT

## 2019-10-28 PROCEDURE — 90682 RIV4 VACC RECOMBINANT DNA IM: CPT

## 2020-01-21 NOTE — PROGRESS NOTES
Subjective     Gisela Pak is a 59 year old female who presents to clinic today for the following health issues:    HPI   Anxiety Follow-Up    How are you doing with your anxiety since your last visit? No change    Are you having other symptoms that might be associated with anxiety? No    Have you had a significant life event? No  Father's health is failing.    Are you feeling depressed? No    Do you have any concerns with your use of alcohol or other drugs? No    Social History     Tobacco Use     Smoking status: Never Smoker     Smokeless tobacco: Never Used     Tobacco comment: a little in college   Substance Use Topics     Alcohol use: Yes     Alcohol/week: 0.0 standard drinks     Comment: very rare     Drug use: No     KIRSTIN-7 SCORE 8/1/2018 3/25/2019 1/23/2020   Total Score - - -   Total Score 6 (mild anxiety) - 6 (mild anxiety)   Total Score 6 9 6     PHQ 1/15/2018 8/1/2018 1/23/2020   PHQ-9 Total Score 5 5 4   Q9: Thoughts of better off dead/self-harm past 2 weeks Not at all Not at all Not at all       Migraine     Since your last clinic visit, how have your headaches changed?  No change    How often are you getting headaches or migraines? dependant on weather     Are you able to do normal daily activities when you have a migraine? Yes    Are you taking rescue/relief medications? (Select all that apply) Other: Fioricet    How helpful is your rescue/relief medication?  I get some relief    Are you taking any medications to prevent migraines? (Select all that apply)  No    In the past 4 weeks, how often have you gone to urgent care or the emergency room because of your headaches?  0     She'd like a refill of the fioricet without caffeine.  She likes to have fioricet both with and without caffeine on hand.        How many servings of fruits and vegetables do you eat daily?  2-3    On average, how many sweetened beverages do you drink each day (Examples: soda, juice, sweet tea, etc.  Do NOT count diet or  "artificially sweetened beverages)?   0    How many days per week do you exercise enough to make your heart beat faster? 5    How many minutes a day do you exercise enough to make your heart beat faster? 20 - 29    How many days per week do you miss taking your medication? 0    PROBLEMS TO ADD ON...  Right foot always feels cold.  No numbness/tingling.  Worse in cold temperatures.  No history of injury or frostbite.      BP Readings from Last 3 Encounters:   01/23/20 122/84   09/04/19 124/82   06/04/19 132/86    Wt Readings from Last 3 Encounters:   01/23/20 84.8 kg (187 lb)   09/04/19 83 kg (183 lb)   06/04/19 85 kg (187 lb 8 oz)            Reviewed and updated as needed this visit by Provider  Meds  Problems         Review of Systems   ROS COMP: Constitutional, cardiovascular, neuro systems are negative, except as otherwise noted.      Objective    /84 (BP Location: Left arm, Patient Position: Sitting, Cuff Size: Adult Large)   Pulse 71   Temp 98.4  F (36.9  C) (Oral)   Resp 14   Ht 1.6 m (5' 3\")   Wt 84.8 kg (187 lb)   LMP 09/14/2011   SpO2 96%   Breastfeeding No   BMI 33.13 kg/m    Body mass index is 33.13 kg/m .  Physical Exam   GEN:  no apparent distress  EYES: sclerae and conjunctivae clear with no discharge   CV:  pedal pulses strong and symmetric  SKIN: feet without edema and skin is warm, dry, and intact with no lesions or color change  NEURO:  No focal deficits noted, No facial asymmetry, Equal movement of all 4 extremities  PSYCH:  Appearance/Behavior: patient is appropriately and casually dressed.  Speech:  normal  rate, rhythm, and tone.  Mood/Affect: Bright/congruent.  Insight: good       Diagnostic Test Results:  Labs reviewed in Epic        Assessment & Plan     1. Generalized anxiety disorder  2. Primary insomnia  Uses clonazepam sparingly - last script for #30 has lasted 10 months.    - clonazePAM (KLONOPIN) 0.5 MG tablet; Take 0.5-1 tablets (0.25-0.5 mg) by mouth nightly as needed " for anxiety  Dispense: 30 tablet; Refill: 0    3. Migraine with aura and without status migrainosus, not intractable  stable/well controlled   - Butalbital-Acetaminophen (PHRENILIN)  MG TABS per tablet; TAKE ONE OR TWO TABLETS BY MOUTH EVERY SIX HOURS AS NEEDED FOR HEADACHES  Dispense: 30 tablet; Refill: 0    4. Altered sensation of foot  Unclear etiology.  Reassured patient that vasculature is intact and no other abnormalities are noted on exam.  This could be Raynaud's although it is odd to be unilateral.  It could be neurologic in etiology - a form of dysesthesia or even possibly RSD.  I gave her a referral to Neurology if she'd like to pursue further evaluation.     - NEUROLOGY ADULT REFERRAL        There are no Patient Instructions on file for this visit.    No follow-ups on file.    Mel Beavers MD  [facility]

## 2020-01-23 ENCOUNTER — OFFICE VISIT (OUTPATIENT)
Dept: FAMILY MEDICINE | Facility: CLINIC | Age: 60
End: 2020-01-23
Payer: COMMERCIAL

## 2020-01-23 VITALS
OXYGEN SATURATION: 96 % | HEART RATE: 71 BPM | HEIGHT: 63 IN | WEIGHT: 187 LBS | RESPIRATION RATE: 14 BRPM | TEMPERATURE: 98.4 F | BODY MASS INDEX: 33.13 KG/M2 | DIASTOLIC BLOOD PRESSURE: 84 MMHG | SYSTOLIC BLOOD PRESSURE: 122 MMHG

## 2020-01-23 DIAGNOSIS — G43.109 MIGRAINE WITH AURA AND WITHOUT STATUS MIGRAINOSUS, NOT INTRACTABLE: ICD-10-CM

## 2020-01-23 DIAGNOSIS — R20.9 ALTERED SENSATION OF FOOT: ICD-10-CM

## 2020-01-23 DIAGNOSIS — F41.1 GENERALIZED ANXIETY DISORDER: Primary | ICD-10-CM

## 2020-01-23 DIAGNOSIS — F51.01 PRIMARY INSOMNIA: ICD-10-CM

## 2020-01-23 PROCEDURE — 99214 OFFICE O/P EST MOD 30 MIN: CPT | Performed by: FAMILY MEDICINE

## 2020-01-23 RX ORDER — CLONAZEPAM 0.5 MG/1
0.25-0.5 TABLET ORAL
Qty: 30 TABLET | Refills: 0 | Status: SHIPPED | OUTPATIENT
Start: 2020-01-23 | End: 2020-06-30

## 2020-01-23 RX ORDER — BUTALBITAL/ACETAMINOPHEN 50MG-325MG
TABLET ORAL
Qty: 30 TABLET | Refills: 0 | Status: SHIPPED | OUTPATIENT
Start: 2020-01-23 | End: 2020-10-15

## 2020-01-23 ASSESSMENT — ANXIETY QUESTIONNAIRES
2. NOT BEING ABLE TO STOP OR CONTROL WORRYING: SEVERAL DAYS
7. FEELING AFRAID AS IF SOMETHING AWFUL MIGHT HAPPEN: SEVERAL DAYS
1. FEELING NERVOUS, ANXIOUS, OR ON EDGE: SEVERAL DAYS
GAD7 TOTAL SCORE: 6
5. BEING SO RESTLESS THAT IT IS HARD TO SIT STILL: NOT AT ALL
3. WORRYING TOO MUCH ABOUT DIFFERENT THINGS: SEVERAL DAYS
GAD7 TOTAL SCORE: 6
4. TROUBLE RELAXING: SEVERAL DAYS
7. FEELING AFRAID AS IF SOMETHING AWFUL MIGHT HAPPEN: SEVERAL DAYS
GAD7 TOTAL SCORE: 6
6. BECOMING EASILY ANNOYED OR IRRITABLE: SEVERAL DAYS

## 2020-01-23 ASSESSMENT — PATIENT HEALTH QUESTIONNAIRE - PHQ9
SUM OF ALL RESPONSES TO PHQ QUESTIONS 1-9: 4
SUM OF ALL RESPONSES TO PHQ QUESTIONS 1-9: 4
10. IF YOU CHECKED OFF ANY PROBLEMS, HOW DIFFICULT HAVE THESE PROBLEMS MADE IT FOR YOU TO DO YOUR WORK, TAKE CARE OF THINGS AT HOME, OR GET ALONG WITH OTHER PEOPLE: NOT DIFFICULT AT ALL

## 2020-01-23 ASSESSMENT — MIFFLIN-ST. JEOR: SCORE: 1397.36

## 2020-01-24 ASSESSMENT — PATIENT HEALTH QUESTIONNAIRE - PHQ9: SUM OF ALL RESPONSES TO PHQ QUESTIONS 1-9: 4

## 2020-01-24 ASSESSMENT — ANXIETY QUESTIONNAIRES: GAD7 TOTAL SCORE: 6

## 2020-03-22 ENCOUNTER — HEALTH MAINTENANCE LETTER (OUTPATIENT)
Age: 60
End: 2020-03-22

## 2020-05-21 ENCOUNTER — NURSE TRIAGE (OUTPATIENT)
Dept: NURSING | Facility: CLINIC | Age: 60
End: 2020-05-21

## 2020-05-22 ENCOUNTER — VIRTUAL VISIT (OUTPATIENT)
Dept: FAMILY MEDICINE | Facility: CLINIC | Age: 60
End: 2020-05-22
Payer: COMMERCIAL

## 2020-05-22 VITALS — HEIGHT: 63 IN | BODY MASS INDEX: 31.89 KG/M2 | WEIGHT: 180 LBS

## 2020-05-22 DIAGNOSIS — N39.46 MIXED URGE AND STRESS INCONTINENCE: ICD-10-CM

## 2020-05-22 DIAGNOSIS — R39.15 URINARY URGENCY: Primary | ICD-10-CM

## 2020-05-22 DIAGNOSIS — R35.0 URINARY FREQUENCY: ICD-10-CM

## 2020-05-22 DIAGNOSIS — F41.1 GENERALIZED ANXIETY DISORDER: ICD-10-CM

## 2020-05-22 PROCEDURE — 99213 OFFICE O/P EST LOW 20 MIN: CPT | Mod: TEL | Performed by: FAMILY MEDICINE

## 2020-05-22 ASSESSMENT — MIFFLIN-ST. JEOR: SCORE: 1364.56

## 2020-05-22 NOTE — TELEPHONE ENCOUNTER
"\"I think I have a UTI. My sx started last week, but they come and go. Tonight I did one of those OTC tests and it came back that I have an infection.\" Pt sx are urgency and frequency and some \"mild low back pain\" Pt denies burning with urination, blood in urine, fever or other sx. Triaged and advised a telephone appt in am with PCP. Transferred to scheduling and advised to call back if needed.  Venecia Perez RN [facility]  COVID 19 Nurse Triage Plan/Patient Instructions    Please be aware that novel coronavirus (COVID-19) may be circulating in the community. If you develop symptoms such as fever, cough, or SOB or if you have concerns about the presence of another infection including coronavirus (COVID-19), please contact your health care provider or visit [email or web address].     Disposition/Instructions    Patient to have scheduled Telephone Visit with a provider. Follow System Ambulatory Workflow for COVID 19.     The clinic staff will assist you to schedule an appointment to complete the Telephone Visit with a provider during normal clinic hours.       Call Back If: Your symptoms worsen before you are able to complete your Telephone Visit with a provider.    Thank you for limiting contact with others, wearing a simple mask to cover your cough, practice good hand hygiene habits and accessing our virtual services where possible to limit the spread of this virus.    For more information about COVID19 and options for caring for yourself at home, please visit the CDC website at https://www.cdc.gov/coronavirus/2019-ncov/about/steps-when-sick.html  For more options for care at [facility], please visit our website at [email or web address]    For more information, please use the [place] Department of Health COVID-19 Website: https://www.health.state.[place]/diseases/coronavirus/index.html  [place] Department of Health ([facility]) COVID-19 Hotlines (Interpreters available): "      Health questions: Phone Number: [phone] or [phone] and Hours: 7 a.m. to 7 p.m.    Schools and  questions: Phone Number: [phone] or [phone] and Hours 7 a.m. to 7 p.m.                    Additional Information    Negative: [1] Unable to urinate (or only a few drops) > 4 hours AND     [2] bladder feels very full (e.g., palpable bladder or strong urge to urinate)    Negative: [1] Decreased urination and [2] drinking very little AND [2] dehydration suspected (e.g., dark urine, no urine > 12 hours, very dry mouth, very lightheaded)    Negative: Patient sounds very sick or weak to the triager    Negative: Fever > 100.5 F (38.1 C)    Negative: Side (flank) or lower back pain present    Negative: [1] Can't control passage of urine (i.e., urinary incontinence) AND [2] new onset (< 2 weeks) or worsening    Negative: Urinating more frequently than usual (i.e., frequency)    Bad or foul-smelling urine    Protocols used: URINARY SYMPTOMS-A-AH

## 2020-05-22 NOTE — PROGRESS NOTES
"Gisela Pak is a 60 year old female who is being evaluated via a billable telephone visit.      The patient has been notified of following:     \"This telephone visit will be conducted via a call between you and your physician/provider. We have found that certain health care needs can be provided without the need for a physical exam.  This service lets us provide the care you need with a short phone conversation.  If a prescription is necessary we can send it directly to your pharmacy.  If lab work is needed we can place an order for that and you can then stop by our lab to have the test done at a later time.    Telephone visits are billed at different rates depending on your insurance coverage. During this emergency period, for some insurers they may be billed the same as an in-person visit.  Please reach out to your insurance provider with any questions.    If during the course of the call the physician/provider feels a telephone visit is not appropriate, you will not be charged for this service.\"    Patient has given verbal consent for Telephone visit?  Yes    What phone number would you like to be contacted at? [phone]    How would you like to obtain your AVS? Leatha    Subjective     Gisela Pak is a 60 year old female who presents via phone visit today for the following health issues:    HPI  URINARY TRACT SYMPTOMS      Duration: intermittent 2 weeks     Description  frequency    Intensity:  mild    Accompanying signs and symptoms:  Fever/chills: no   Flank pain no   Nausea and vomiting: YES- nausea  Vaginal symptoms: none  Abdominal/Pelvic Pain: YES- pressure in pelvic    History  History of frequent UTI's: no   History of kidney stones: no   Sexually Active: no   Possibility of pregnancy: No    Precipitating or alleviating factors: None    Therapies tried and outcome: Tylenol   Outcome: unsure    History of migraines on pre-Phrenilin, hyperlipidemia on no meds, KIRSTIN on clonazepam managed by " Dr. Beavers, hx of tachycardia, palpitations, insomnia, osteopenia, paresthesias, lumbago, left hip pain, varicose veins, ganglion cyst, history of microscopic hematuria in the past in 2002 seen by urology declined cystoscopy history of known mixed urge and stress incontinence, prior EGD and colonoscopy, on calcium, magnesium, omega, potassium and probiotic, intolerant to Lexapro, under care of PCP Dr. Beavers,   Seen by PCP on 1/23/2021 and continued clonazepam 0.5 mg given #30 and refilled her migraine medication and referred to neurology for altered sensation in foot but never seen them.  Here today for urinary frequency and urgency off and on past 1 week with mild low back pain no dysuria or fever.  Home test suggestive of a UTI and appointment made by triage.  New to this writer.    Doing well. Put in a call only because did home test possible UTI. Not super uncomfortable in lower abdomen, going more frequently and no new lifestyle changes  Getting it last 1 month. Third time felt this. Is working at home  &drinking too much caffeine. Hesitant to come in for labs. Only because due to covid pandemic. Symptoms mild. Did home test and it was positive for wbc but negative nitrites. Repeated this am and wbc test not as pronounced as last night, urine less dark and nitrites still negative.   No fever  Feels mildly  Cruddy otherwise well  Hesitant to take antibiotic unnecessarily    Patient Active Problem List   Diagnosis     Insomnia     Mixed urge and stress incontinence     Generalized anxiety disorder     Classical migraine     Varicose veins of legs     Ganglion of tendon sheath     Palpitations     Pure hypercholesterolemia     Osteopenia     Past Surgical History:   Procedure Laterality Date     COLONOSCOPY  2/5/02    ZAK, Dr. Solomon     COLONOSCOPY  7/9/12    WNL, Dr. Sharma, rpt 10 yrs.     UPPER GI ENDOSCOPY  3/28/02    HH, gastritis, reflux esophagitis, neg bx, Dr. Solomon       Social History     Tobacco  "Use     Smoking status: Never Smoker     Smokeless tobacco: Never Used     Tobacco comment: a little in college   Substance Use Topics     Alcohol use: Yes     Alcohol/week: 0.0 standard drinks     Comment: very rare     Family History   Problem Relation Age of Onset     Cerebrovascular Disease Maternal Grandmother      Colon Cancer Maternal Grandmother      Cerebrovascular Disease Maternal Grandfather      C.A.D. Paternal Grandfather      C.A.D. Paternal Grandmother      Breast Cancer Paternal Grandmother      Cerebrovascular Disease Mother      Other - See Comments Mother 80        churg adia syndrome     Eye Disorder Father         \"going blind\"     Coronary Artery Disease Father         CAB at 78     Breast Cancer Paternal Aunt      Breast Cancer Maternal Aunt         x3 aunts     Colon Cancer Maternal Uncle          Current Outpatient Medications   Medication Sig Dispense Refill     Butalbital-Acetaminophen (PHRENILIN)  MG TABS per tablet TAKE ONE OR TWO TABLETS BY MOUTH EVERY SIX HOURS AS NEEDED FOR HEADACHES 30 tablet 0     clonazePAM (KLONOPIN) 0.5 MG tablet Take 0.5-1 tablets (0.25-0.5 mg) by mouth nightly as needed for anxiety 30 tablet 0     calcium carbonate 750 MG CHEW Take 750 mg by mouth daily       magnesium 250 MG tablet Take 1 tablet by mouth daily       omega 3 1000 MG CAPS        potassium aminobenzoate 500 MG CAPS capsule        Probiotic Product (PROBIOTIC-10 PO)        Allergies   Allergen Reactions     Lexapro [Escitalopram] Palpitations and Nausea     Recent Labs   Lab Test 03/15/18  1612 03/15/18  0816 06/05/17  1821  06/06/16  0811  03/05/12  0838   LDL  --  158*  --   --  125*  --  167*   HDL  --  85  --   --  78  --  70   TRIG  --  91  --   --  96  --  78   ALT  --   --   --   --  32  --   --    CR 0.76  --  0.73   < > 0.82   < > 0.87   GFRESTIMATED 78  --  83   < > 72   < > 69   GFRESTBLACK >90  --  >90  African American GFR Calc     < > 87   < > 83   POTASSIUM 3.9  --  4.0   < " "> 3.9   < > 4.3   TSH 1.04  --  1.18  --   --   --   --     < > = values in this interval not displayed.      BP Readings from Last 3 Encounters:   01/23/20 122/84   09/04/19 124/82   06/04/19 132/86    Wt Readings from Last 3 Encounters:   05/22/20 81.6 kg (180 lb)   01/23/20 84.8 kg (187 lb)   09/04/19 83 kg (183 lb)                    Reviewed and updated as needed this visit by Provider  Tobacco  Allergies  Meds  Problems  Med Hx  Surg Hx  Fam Hx  Soc Hx          Review of Systems   Constitutional, HEENT, cardiovascular, pulmonary, GI, , musculoskeletal, neuro, skin, endocrine and psych systems are negative, except as otherwise noted.       Objective   Reported vitals:  Ht 1.607 m (5' 3.25\")   Wt 81.6 kg (180 lb)   LMP 09/14/2011   BMI 31.63 kg/m     healthy, alert, no distress and cooperative  PSYCH: Alert and oriented times 3; coherent speech, normal   rate and volume, able to articulate logical thoughts, able   to abstract reason, no tangential thoughts, no hallucinations   or delusions  Her affect is normal and pleasant  RESP: No cough, no audible wheezing, able to talk in full sentences  Remainder of exam unable to be completed due to telephone visits    Diagnostic Test Results:  Labs reviewed in Epic  none         Assessment/Plan:  1. Urinary urgency  Symptoms of urinary frequency and urgency could be suggestive of bladder wall irritation due t, too much caffeine use versus cystitis versus atrophic vaginitis from low estrogen versus a urine infection.  In the absence of fever and nitrites in your home urine test a UTI seems less likely.  Given symptoms are mild and improving and hesitant to come in due to the covid pandemic & would not prescribe antibiotics unnecessarily but recommend to monitor, push fluids, cut back on caffeine, try Tums over-the-counter up to 4 times a day to alkalinize your urine to see if it will make a difference, avoid acidic food and may take Azo over-the-counter 3 " times a day to help with any discomfort.  Avoid tight clothing,.  If condition worsens or you have a fever, you notice blood in your urine or burning or pain call us right away we can get you in with urgent care or do a lab appointment follow-up urine testing.  - UA reflex to Microscopic and Culture; Future  - Wet prep; Future    2. Urinary frequency  - UA reflex to Microscopic and Culture; Future  - Wet prep; Future    3. Mixed urge and stress incontinence  Known hx per chart.    4. Generalized anxiety disorder  Reassured with plan in place      Return in about 4 weeks (around 6/19/2020), or if symptoms worsen or fail to improve.      Phone call duration:  6 minutes    Kelly Baird MD

## 2020-05-22 NOTE — PATIENT INSTRUCTIONS
Symptoms of urinary frequency and urgency could be suggestive of bladder wall irritation due t, too much caffeine use versus cystitis versus atrophic vaginitis from low estrogen versus a urine infection.  In the absence of fever and nitrites in your home urine test a UTI seems less likely.  Given symptoms are mild and improving and hesitant to come in due to the covid pandemic & would not prescribe antibiotics unnecessarily but recommend to monitor, push fluids, cut back on caffeine, try Tums over-the-counter up to 4 times a day to alkalinize your urine to see if it will make a difference, avoid acidic food and may take Azo over-the-counter 3 times a day to help with any discomfort.  Avoid tight clothing,.  If condition worsens or you have a fever, you notice blood in your urine or burning or pain call us right away we can get you in with urgent care or do a lab appointment follow-up urine testing. i'll leave a U And wet prep order on file.

## 2020-06-30 ENCOUNTER — E-VISIT (OUTPATIENT)
Dept: FAMILY MEDICINE | Facility: CLINIC | Age: 60
End: 2020-06-30
Payer: COMMERCIAL

## 2020-06-30 ENCOUNTER — MYC REFILL (OUTPATIENT)
Dept: FAMILY MEDICINE | Facility: CLINIC | Age: 60
End: 2020-06-30

## 2020-06-30 DIAGNOSIS — F51.01 PRIMARY INSOMNIA: ICD-10-CM

## 2020-06-30 DIAGNOSIS — F41.1 GENERALIZED ANXIETY DISORDER: ICD-10-CM

## 2020-06-30 PROCEDURE — 99421 OL DIG E/M SVC 5-10 MIN: CPT | Performed by: FAMILY MEDICINE

## 2020-06-30 RX ORDER — CLONAZEPAM 0.5 MG/1
0.25-0.5 TABLET ORAL
Qty: 30 TABLET | Refills: 0 | Status: SHIPPED | OUTPATIENT
Start: 2020-06-30 | End: 2021-01-12

## 2020-06-30 RX ORDER — CLONAZEPAM 0.5 MG/1
0.25-0.5 TABLET ORAL
Qty: 30 TABLET | Refills: 0 | Status: CANCELLED | OUTPATIENT
Start: 2020-06-30

## 2020-10-13 DIAGNOSIS — G43.109 MIGRAINE WITH AURA AND WITHOUT STATUS MIGRAINOSUS, NOT INTRACTABLE: ICD-10-CM

## 2020-10-15 RX ORDER — BUTALBITAL/ACETAMINOPHEN 50MG-325MG
TABLET ORAL
Qty: 30 TABLET | Refills: 0 | Status: SHIPPED | OUTPATIENT
Start: 2020-10-15 | End: 2021-01-12

## 2020-10-15 RX ORDER — BUTALBITAL, ACETAMINOPHEN AND CAFFEINE 50; 325; 40 MG/1; MG/1; MG/1
CAPSULE ORAL
Qty: 30 CAPSULE | Refills: 0 | Status: SHIPPED | OUTPATIENT
Start: 2020-10-15 | End: 2021-01-12

## 2020-10-15 NOTE — TELEPHONE ENCOUNTER
last visit 6/20  Last refill 1/20 for #30 0 refills   For the phrenilin- but not the esgic  Both are being requested    Thanks!     Aida Wright RN

## 2021-01-08 ENCOUNTER — MYC MEDICAL ADVICE (OUTPATIENT)
Dept: FAMILY MEDICINE | Facility: CLINIC | Age: 61
End: 2021-01-08

## 2021-01-11 NOTE — TELEPHONE ENCOUNTER
Writer responded via UTILICASE.  KEVIN VillafanaN, RN  [facility]

## 2021-01-11 NOTE — PATIENT INSTRUCTIONS
Did you know?   I do telehealth (video) visits exclusively on  and .  I still do in-person visits at [facility] on  and .  You can schedule a video visit for follow-up appointments as well as future appointments for certain conditions.  Please see the below link.  [email or web address]    If you have not already done so,  I encourage you to sign up for [employer] ([email or web address]).  This will allow you to review your results, securely communicate with a provider, and schedule virtual visits as well.    If you are due for a mammogram or colonoscopy please call the [facility] scheduling number: [phone].     To schedule any ordered imaging studies you can call [facility] [phone].

## 2021-01-12 ENCOUNTER — MYC MEDICAL ADVICE (OUTPATIENT)
Dept: FAMILY MEDICINE | Facility: CLINIC | Age: 61
End: 2021-01-12

## 2021-01-12 ENCOUNTER — VIRTUAL VISIT (OUTPATIENT)
Dept: FAMILY MEDICINE | Facility: CLINIC | Age: 61
End: 2021-01-12
Payer: COMMERCIAL

## 2021-01-12 DIAGNOSIS — F51.01 PRIMARY INSOMNIA: ICD-10-CM

## 2021-01-12 DIAGNOSIS — F41.1 GENERALIZED ANXIETY DISORDER: ICD-10-CM

## 2021-01-12 DIAGNOSIS — G43.109 MIGRAINE WITH AURA AND WITHOUT STATUS MIGRAINOSUS, NOT INTRACTABLE: ICD-10-CM

## 2021-01-12 PROCEDURE — 99214 OFFICE O/P EST MOD 30 MIN: CPT | Mod: 95 | Performed by: FAMILY MEDICINE

## 2021-01-12 RX ORDER — BUTALBITAL/ACETAMINOPHEN 50MG-325MG
TABLET ORAL
Qty: 30 TABLET | Refills: 0 | Status: SHIPPED | OUTPATIENT
Start: 2021-01-12 | End: 2022-01-12

## 2021-01-12 RX ORDER — BUTALBITAL, ACETAMINOPHEN AND CAFFEINE 50; 325; 40 MG/1; MG/1; MG/1
CAPSULE ORAL
Qty: 30 CAPSULE | Refills: 0 | Status: SHIPPED | OUTPATIENT
Start: 2021-01-12 | End: 2022-01-12

## 2021-01-12 RX ORDER — CLONAZEPAM 0.5 MG/1
0.25-0.5 TABLET ORAL
Qty: 30 TABLET | Refills: 0 | Status: SHIPPED | OUTPATIENT
Start: 2021-01-12 | End: 2021-07-13

## 2021-01-12 ASSESSMENT — ANXIETY QUESTIONNAIRES
5. BEING SO RESTLESS THAT IT IS HARD TO SIT STILL: NOT AT ALL
7. FEELING AFRAID AS IF SOMETHING AWFUL MIGHT HAPPEN: NOT AT ALL
2. NOT BEING ABLE TO STOP OR CONTROL WORRYING: SEVERAL DAYS
6. BECOMING EASILY ANNOYED OR IRRITABLE: SEVERAL DAYS
4. TROUBLE RELAXING: SEVERAL DAYS
3. WORRYING TOO MUCH ABOUT DIFFERENT THINGS: SEVERAL DAYS
GAD7 TOTAL SCORE: 5
7. FEELING AFRAID AS IF SOMETHING AWFUL MIGHT HAPPEN: NOT AT ALL
GAD7 TOTAL SCORE: 5
1. FEELING NERVOUS, ANXIOUS, OR ON EDGE: SEVERAL DAYS
GAD7 TOTAL SCORE: 5

## 2021-01-12 ASSESSMENT — PATIENT HEALTH QUESTIONNAIRE - PHQ9
SUM OF ALL RESPONSES TO PHQ QUESTIONS 1-9: 3
10. IF YOU CHECKED OFF ANY PROBLEMS, HOW DIFFICULT HAVE THESE PROBLEMS MADE IT FOR YOU TO DO YOUR WORK, TAKE CARE OF THINGS AT HOME, OR GET ALONG WITH OTHER PEOPLE: NOT DIFFICULT AT ALL
SUM OF ALL RESPONSES TO PHQ QUESTIONS 1-9: 3

## 2021-01-12 NOTE — PROGRESS NOTES
Gisela is a 60 year old who is being evaluated via a billable video visit.      How would you like to obtain your AVS? MyChart  If the video visit is dropped, the invitation should be resent by: Text to cell phone: [phone] (M)  Will anyone else be joining your video visit? No      Video Start Time: 2:28 PM  Assessment & Plan     Migraine with aura and without status migrainosus, not intractable  With some change in her headache pattern.  I suggested [facility] and gave referral for that.  For now she'll continue with her current regimen of butalbital+acetaminophen (with and w/o caffeine).  I checked the [place] Prescription Monitoring Program website which showed no concerning activity.   She last filled these both 3 months ago.  - NEUROLOGY ADULT REFERRAL  - Butalbital-Acetaminophen (PHRENILIN)  MG TABS per tablet  Dispense: 30 tablet; Refill: 0  - butalbital-acetaminophen-caffeine (ESGIC) -40 MG CAPS per capsule  Dispense: 30 capsule; Refill: 0    Primary insomnia  Generalized anxiety disorder  She uses clonazepam sparingly.  Last #30 tab supply has lasted over 6 months as confirmed by [place]  website.  - clonazePAM (KLONOPIN) 0.5 MG tablet  Dispense: 30 tablet; Refill: 0         See Patient Instructions    No follow-ups on file.    Mel Beavers MD  [facility]     Gisela is a 60 year old who presents to clinic today for the following health issues     History of Present Illness       Mental Health Follow-up: Patient's depression since last visit has been:  Good  The patient is not having other symptoms associated with depression.  Patient's anxiety since last visit has been:  Medium  The patient is having other symptoms associated with anxiety.  Any significant life events: grief or loss  Patient is feeling anxious or having panic attacks.  Patient has no concerns about alcohol or drug use.     Social History  Tobacco Use    Smoking status:  Never Smoker    Smokeless tobacco: Never Used    Tobacco comment: a little in college  Alcohol use: Yes    Alcohol/week: 0.0 standard drinks    Comment: a glass of wine with dinner every once in a while  Drug use: No      Today's PHQ-9         PHQ-9 Total Score:     (P) 3   PHQ-9 Q9 Thoughts of better off dead/self-harm past 2 weeks :   (P) Not at all   Thoughts of suicide or self harm:      Self-harm Plan:        Self-harm Action:          Safety concerns for self or others:            Answers for HPI/ROS submitted by the patient on 1/12/2021   Chronic problems general questions HPI Form  If you checked off any problems, how difficult have these problems made it for you to do your work, take care of things at home, or get along with other people?: Not difficult at all  PHQ9 TOTAL SCORE: 3  KIRSTIN 7 TOTAL SCORE: 5    Migraine     Since your last clinic visit, how have your headaches changed?  Intermittent, come mostly at night    How often are you getting headaches or migraines? Weather related      Are you able to do normal daily activities when you have a migraine? Yes    Are you taking rescue/relief medications? (Select all that apply) Other    How helpful is your rescue/relief medication?  I get some relief    Are you taking any medications to prevent migraines? (Select all that apply)  Other     In the past 4 weeks, how often have you gone to urgent care or the emergency room because of your headaches?  0  She has noticed some changes in her headaches.  She has had some visual auras and some visual migraines which is unusual for her.  She notes scalp tenderness.  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    Anxiety Follow-Up    How are you doing with your anxiety since your last visit? Worsened due to covid     Are you having other symptoms that might be associated with anxiety? No    Have you had  a significant life event? Grief or Loss during covid      Are you feeling depressed? Yes:  grief  father     Do you have any concerns with your use of alcohol or other drugs? No  No panic attacks since she was in her 20's.  Social History     Tobacco Use     Smoking status: Never Smoker     Smokeless tobacco: Never Used     Tobacco comment: a little in college   Substance Use Topics     Alcohol use: Yes     Alcohol/week: 0.0 standard drinks     Comment: a glass of wine with dinner every once in a while     Drug use: No     KIRSTIN-7 SCORE 3/25/2019 2020 2021   Total Score - - -   Total Score - 6 (mild anxiety) 5 (mild anxiety)   Total Score 9 6 5     PHQ 2018   PHQ-9 Total Score 5 4 3   Q9: Thoughts of better off dead/self-harm past 2 weeks Not at all Not at all Not at all           Review of Systems         Objective           Vitals:  No vitals were obtained today due to virtual visit.    Physical Exam   GENERAL: Healthy, alert and no distress  EYES: Eyes grossly normal to inspection.  No discharge or erythema, or obvious scleral/conjunctival abnormalities.  RESP: No audible wheeze, cough, or visible cyanosis.  No visible retractions or increased work of breathing.    SKIN: Visible skin clear. No significant rash, abnormal pigmentation or lesions.  NEURO: Cranial nerves grossly intact.  Mentation and speech appropriate for age.  PSYCH: Mentation appears normal, affect normal/bright, judgement and insight intact, normal speech and appearance well-groomed.          Video-Visit Details    Type of service:  Video Visit    Video End Time:2:52 PM    Originating Location (pt. Location): Home    Distant Location (provider location):  [facility]     Platform used for Video Visit: [employer]

## 2021-01-13 ASSESSMENT — ANXIETY QUESTIONNAIRES: GAD7 TOTAL SCORE: 5

## 2021-01-13 ASSESSMENT — PATIENT HEALTH QUESTIONNAIRE - PHQ9: SUM OF ALL RESPONSES TO PHQ QUESTIONS 1-9: 3

## 2021-01-15 ENCOUNTER — HEALTH MAINTENANCE LETTER (OUTPATIENT)
Age: 61
End: 2021-01-15

## 2021-03-08 ENCOUNTER — PRE VISIT (OUTPATIENT)
Dept: NEUROLOGY | Facility: CLINIC | Age: 61
End: 2021-03-08

## 2021-03-08 NOTE — TELEPHONE ENCOUNTER
FUTURE VISIT INFORMATION      FUTURE VISIT INFORMATION:    Date: 3/11/2021    Time: 8am    Location: [facility]  REFERRAL INFORMATION:    Referring provider:  Dr. Beavers    Referring providers clinic:  [facility]    Reason for visit/diagnosis  Migraines     RECORDS REQUESTED FROM:       Clinic name Comments Records Status Imaging Status   Internal Dr. Beavers-1/12/2021, 1/23/2020, 9/4/2019    MRI Brain-12/6/2012 Epic PACS

## 2021-03-11 ENCOUNTER — VIRTUAL VISIT (OUTPATIENT)
Dept: NEUROLOGY | Facility: CLINIC | Age: 61
End: 2021-03-11
Attending: FAMILY MEDICINE
Payer: COMMERCIAL

## 2021-03-11 DIAGNOSIS — R51.9 HEADACHE DISORDER: Primary | ICD-10-CM

## 2021-03-11 DIAGNOSIS — F41.9 ANXIETY: ICD-10-CM

## 2021-03-11 DIAGNOSIS — R26.89 BALANCE PROBLEMS: ICD-10-CM

## 2021-03-11 PROCEDURE — 99205 OFFICE O/P NEW HI 60 MIN: CPT | Mod: 95 | Performed by: NURSE PRACTITIONER

## 2021-03-11 RX ORDER — VITAMIN B COMPLEX
TABLET ORAL DAILY
COMMUNITY

## 2021-03-11 RX ORDER — DIAZEPAM 5 MG
5 TABLET ORAL EVERY 6 HOURS PRN
Qty: 1 TABLET | Refills: 0 | Status: SHIPPED | OUTPATIENT
Start: 2021-03-11 | End: 2021-04-12
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
## 2021-03-29 NOTE — PATIENT INSTRUCTIONS
It does not appear to be unreasonable to recommend Brain MRI for worsening headaches for any secondary causes to explain headaches.   Diazepam 5 mg once 30 minutes before MRI. Quantity of one tablet and no refills.  Do not drive for at least 8 hours. Arrange for a .   Decrease butalbital use to no more than 4 days per month due to possible risk of rebound headache with frequent analgesics use.  Limit use of any acute analgesics OTC to no more than 14 days per month.   Stay hydrated  Vitamin B2 200-400 mg daily OTC  Headache prevention is recommended-propranolol trial for migraine headache prevention suggested.   Cefaly or Nerivio antimigraine device   Acupuncture  Mind and Body Medicine approaches -  Follow up after brain MRI to review    Balance problems and tremor-in person visit for exam and images review

## 2021-04-07 ASSESSMENT — ENCOUNTER SYMPTOMS
PALPITATIONS: 0
WEAKNESS: 0
DIARRHEA: 0
HEADACHES: 1
JOINT SWELLING: 0
FEVER: 0
SHORTNESS OF BREATH: 0
HEMATURIA: 0
HEARTBURN: 0
EYE PAIN: 0
SORE THROAT: 0
ARTHRALGIAS: 0
MYALGIAS: 0
CHILLS: 0
HEMATOCHEZIA: 0
FREQUENCY: 0
PARESTHESIAS: 0
NERVOUS/ANXIOUS: 0
DIZZINESS: 0
BREAST MASS: 0
CONSTIPATION: 0
NAUSEA: 0
COUGH: 0
DYSURIA: 0
ABDOMINAL PAIN: 0

## 2021-04-10 ASSESSMENT — ENCOUNTER SYMPTOMS
DIARRHEA: 0
JOINT SWELLING: 0
CHILLS: 0
NERVOUS/ANXIOUS: 0
WEAKNESS: 0
ABDOMINAL PAIN: 0
FEVER: 0
EYE PAIN: 0
HEMATURIA: 0
HEARTBURN: 0
SORE THROAT: 0
COUGH: 0
FREQUENCY: 0
PARESTHESIAS: 0
BREAST MASS: 0
CONSTIPATION: 0
ARTHRALGIAS: 0
PALPITATIONS: 0
HEMATOCHEZIA: 0
HEADACHES: 1
SHORTNESS OF BREATH: 0
DYSURIA: 0
DIZZINESS: 0
NAUSEA: 0
MYALGIAS: 0
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
## 2021-04-10 NOTE — PATIENT INSTRUCTIONS
Did you know?   I do telehealth (video) visits exclusively on Wednesdays.  I still do in-person visits at [facility] ([phone]) on Mondays, Tuesdays and Thursdays.  You can schedule a video visit for follow-up appointments as well as future appointments for certain conditions.  Please see the below link.  [email or web address]    If you have not already done so,  I encourage you to sign up for Snaptee[employer] ([email or web address]).  This will allow you to review your results, securely communicate with your provider care team, and schedule virtual visits as well.    To schedule any ordered imaging studies you can call [facility] at [phone].  If you are scheduling a DEXA (bone density) scan please do NOT schedule this at the [facility].  Please ask that it be done at [facility] in [place].  Other preferred DEXA locations include [place] (at the [facility]), [place], or [place].      Schedule a fasting lab-only appointment.  Fast for 10 hours prior to labs: nothing to eat or drink except plain water and your pills for ten hours prior to appointment.  In addition, avoid fatty foods and alcohol for 24 hours prior to appointment.     Schedule a GYN exam with [facility] at [phone] ([facility] - [address] [place] [address], [address]).  Dr. Brannon and Dr. Cobb see patients at [facility].      Schedule your MRI.  Also start using Flonase (a nasal steroid spray) daily for the next month.  If still feeling dizzy and or having nasal/sinus congestion I'll refer you to ENT.      Preventive Health Recommendations  Female Ages 50 - 64    Yearly exam: See your health care provider every year in order to  o Review health changes.   o Discuss preventive care.    o Review your medicines if your doctor has prescribed any.      Get  a Pap test every three years (unless you have an abnormal result and your provider advises testing more often).    If you get Pap tests with HPV test, you only need to test every 5 years, unless you have an abnormal result.     You do not need a Pap test if your uterus was removed (hysterectomy) and you have not had cancer.    You should be tested each year for STDs (sexually transmitted diseases) if you're at risk.     Have a mammogram every 1 to 2 years.    Have a colonoscopy at age 50, or have a yearly FIT test (stool test). These exams screen for colon cancer.      Have a cholesterol test every 5 years, or more often if advised.    Have a diabetes test (fasting glucose) every three years. If you are at risk for diabetes, you should have this test more often.     If you are at risk for osteoporosis (brittle bone disease), think about having a bone density scan (DEXA).    Shots: Get a flu shot each year. Get a tetanus shot every 10 years.    Nutrition:     Eat at least 5 servings of fruits and vegetables each day.    Eat whole-grain bread, whole-wheat pasta and brown rice instead of white grains and rice.    Get adequate Calcium and Vitamin D.     Lifestyle    Exercise at least 150 minutes a week (30 minutes a day, 5 days a week). This will help you control your weight and prevent disease.    Limit alcohol to one drink per day.    No smoking.     Wear sunscreen to prevent skin cancer.     See your dentist every six months for an exam and cleaning.    See your eye doctor every 1 to 2 years.

## 2021-04-12 ENCOUNTER — OFFICE VISIT (OUTPATIENT)
Dept: FAMILY MEDICINE | Facility: CLINIC | Age: 61
End: 2021-04-12
Payer: COMMERCIAL

## 2021-04-12 VITALS
DIASTOLIC BLOOD PRESSURE: 72 MMHG | RESPIRATION RATE: 16 BRPM | OXYGEN SATURATION: 99 % | BODY MASS INDEX: 31.71 KG/M2 | TEMPERATURE: 97.4 F | SYSTOLIC BLOOD PRESSURE: 126 MMHG | HEIGHT: 63 IN | WEIGHT: 179 LBS | HEART RATE: 67 BPM

## 2021-04-12 DIAGNOSIS — Z13.1 SCREENING FOR DIABETES MELLITUS: ICD-10-CM

## 2021-04-12 DIAGNOSIS — Z13.220 LIPID SCREENING: ICD-10-CM

## 2021-04-12 DIAGNOSIS — Z00.00 ROUTINE GENERAL MEDICAL EXAMINATION AT A HEALTH CARE FACILITY: Primary | ICD-10-CM

## 2021-04-12 DIAGNOSIS — R09.81 NASAL CONGESTION: ICD-10-CM

## 2021-04-12 DIAGNOSIS — Z12.31 ENCOUNTER FOR SCREENING MAMMOGRAM FOR BREAST CANCER: ICD-10-CM

## 2021-04-12 DIAGNOSIS — G43.109 MIGRAINE WITH AURA AND WITHOUT STATUS MIGRAINOSUS, NOT INTRACTABLE: ICD-10-CM

## 2021-04-12 PROCEDURE — 99396 PREV VISIT EST AGE 40-64: CPT | Performed by: FAMILY MEDICINE

## 2021-04-12 PROCEDURE — 99213 OFFICE O/P EST LOW 20 MIN: CPT | Mod: 25 | Performed by: FAMILY MEDICINE

## 2021-04-12 RX ORDER — FLUTICASONE PROPIONATE 50 MCG
1 SPRAY, SUSPENSION (ML) NASAL DAILY
Qty: 16 G | Refills: 11 | Status: SHIPPED | OUTPATIENT
Start: 2021-04-12 | End: 2023-03-29

## 2021-04-12 ASSESSMENT — MIFFLIN-ST. JEOR: SCORE: 1355.03

## 2021-04-19 ENCOUNTER — ANCILLARY PROCEDURE (OUTPATIENT)
Dept: MAMMOGRAPHY | Facility: CLINIC | Age: 61
End: 2021-04-19
Attending: FAMILY MEDICINE
Payer: COMMERCIAL

## 2021-04-19 DIAGNOSIS — Z13.1 SCREENING FOR DIABETES MELLITUS: ICD-10-CM

## 2021-04-19 DIAGNOSIS — Z12.31 ENCOUNTER FOR SCREENING MAMMOGRAM FOR BREAST CANCER: ICD-10-CM

## 2021-04-19 DIAGNOSIS — Z13.220 LIPID SCREENING: ICD-10-CM

## 2021-04-19 PROCEDURE — 36415 COLL VENOUS BLD VENIPUNCTURE: CPT | Performed by: FAMILY MEDICINE

## 2021-04-19 PROCEDURE — 82947 ASSAY GLUCOSE BLOOD QUANT: CPT | Performed by: FAMILY MEDICINE

## 2021-04-19 PROCEDURE — 77063 BREAST TOMOSYNTHESIS BI: CPT | Mod: GC | Performed by: RADIOLOGY

## 2021-04-19 PROCEDURE — 80061 LIPID PANEL: CPT | Performed by: FAMILY MEDICINE

## 2021-04-19 PROCEDURE — 77067 SCR MAMMO BI INCL CAD: CPT | Mod: GC | Performed by: RADIOLOGY

## 2021-04-21 LAB
CHOLEST SERPL-MCNC: 244 MG/DL
GLUCOSE SERPL-MCNC: 67 MG/DL (ref 70–99)
HDLC SERPL-MCNC: 83 MG/DL
LDLC SERPL CALC-MCNC: 141 MG/DL
NONHDLC SERPL-MCNC: 161 MG/DL
TRIGL SERPL-MCNC: 98 MG/DL

## 2021-04-23 NOTE — RESULT ENCOUNTER NOTE
Gray Higgins,  Good job - your lipid panel (cholesterol) results are somewhat better than when we last checked in 2018.  Also, your HDL (good) cholesterol remains nice and high which is a cardiovascular protective factor.      Your fasting blood sugar was slightly low so you definitely don't have diabetes.     Keep eating a healthy, plant-based diet and exercise regularly.      Mel Beavers MD

## 2021-04-27 ENCOUNTER — ANCILLARY PROCEDURE (OUTPATIENT)
Dept: MRI IMAGING | Facility: CLINIC | Age: 61
End: 2021-04-27
Attending: NURSE PRACTITIONER
Payer: COMMERCIAL

## 2021-04-27 DIAGNOSIS — R26.89 BALANCE PROBLEMS: ICD-10-CM

## 2021-04-27 DIAGNOSIS — R51.9 HEADACHE DISORDER: ICD-10-CM

## 2021-04-27 PROCEDURE — 70551 MRI BRAIN STEM W/O DYE: CPT | Performed by: RADIOLOGY

## 2021-04-29 ENCOUNTER — OFFICE VISIT (OUTPATIENT)
Dept: OBGYN | Facility: CLINIC | Age: 61
End: 2021-04-29
Payer: COMMERCIAL

## 2021-04-29 VITALS
TEMPERATURE: 97.3 F | WEIGHT: 179 LBS | BODY MASS INDEX: 31.46 KG/M2 | HEART RATE: 96 BPM | SYSTOLIC BLOOD PRESSURE: 141 MMHG | DIASTOLIC BLOOD PRESSURE: 85 MMHG

## 2021-04-29 DIAGNOSIS — Z12.4 SCREENING FOR MALIGNANT NEOPLASM OF CERVIX: Primary | ICD-10-CM

## 2021-04-29 PROCEDURE — 87624 HPV HI-RISK TYP POOLED RSLT: CPT | Performed by: OBSTETRICS & GYNECOLOGY

## 2021-04-29 PROCEDURE — 99203 OFFICE O/P NEW LOW 30 MIN: CPT | Performed by: OBSTETRICS & GYNECOLOGY

## 2021-04-29 PROCEDURE — G0145 SCR C/V CYTO,THINLAYER,RESCR: HCPCS | Performed by: OBSTETRICS & GYNECOLOGY
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
## 2021-05-04 ENCOUNTER — VIRTUAL VISIT (OUTPATIENT)
Dept: NEUROLOGY | Facility: CLINIC | Age: 61
End: 2021-05-04
Payer: COMMERCIAL

## 2021-05-04 DIAGNOSIS — G43.009 MIGRAINE WITHOUT AURA AND WITHOUT STATUS MIGRAINOSUS, NOT INTRACTABLE: ICD-10-CM

## 2021-05-04 DIAGNOSIS — R51.9 HEADACHE DISORDER: Primary | ICD-10-CM

## 2021-05-04 LAB
COPATH REPORT: NORMAL
PAP: NORMAL

## 2021-05-04 PROCEDURE — 99214 OFFICE O/P EST MOD 30 MIN: CPT | Mod: 95 | Performed by: NURSE PRACTITIONER

## 2021-05-04 NOTE — PROGRESS NOTES
Gisela is a 61 year old who is being evaluated via a billable video visit.      How would you like to obtain your AVS? Mychart  If the video visit is dropped, the invitation should be resent by: [phone]      Video Start Time: 1:53 PM  Video-Visit Details    Type of service:  Video Visit    Video End Time:2:14 PM    Originating Location (pt. Location): Home    Distant Location (provider location):  [facility] NEUROLOG[facility]     Platform used for Video Visit: Sviral    Reason for visit:  Headache follow up     Interval History:  Initial visit on 3/11/2021, see note for details  Brain MRI was requested for worsening headaches and completed on 4/27/2021-images reviewed -chronic changes not explaining patient's headaches.      Today patient reports -One headache since seen and no headaches in weeks. Took ibuprofen once.   Brain MRI reviewed with the patient via screen sharing Alexandre   Patient reports that she would like wait with any further headache treatment and no acute findings at this time pre brain MRI review reassuring.     Plan:  Follow up as needed     PMH, allergies and current prescription medications reviewed    Patient appears alert and no in apparent acute distress,  mentation appears normal, judgement and insight intact, normal speech.      DATA reviewed:  Brain MRI from 4/27/2021  Impression: Essentially normal brain MRI with a few nonspecific areas  of T2 hyperintensity that have slightly progressed since 2012,  potentially related to prior infectious, inflammatory, ischemic, or  demyelinating process.    I discussed all my recommendations with Gisela HERNANDEZ Pasha who verbalizes understanding and comfortable with the plan.  All of patient's questions were answered from the best of my knowledge.  Patient is in agreement with the plan.     37 minutes spent on the date of the encounter doing chart, headaches and images review, documentation and further activities as noted  above    Kassi Fletcher APRN, CNP [facility]  Headache certified  [facility]

## 2021-05-05 LAB
FINAL DIAGNOSIS: NORMAL
HPV HR 12 DNA CVX QL NAA+PROBE: NEGATIVE
HPV16 DNA SPEC QL NAA+PROBE: NEGATIVE
HPV18 DNA SPEC QL NAA+PROBE: NEGATIVE
SPECIMEN DESCRIPTION: NORMAL
SPECIMEN SOURCE CVX/VAG CYTO: NORMAL

## 2021-05-13 ENCOUNTER — TRANSFERRED RECORDS (OUTPATIENT)
Dept: HEALTH INFORMATION MANAGEMENT | Facility: CLINIC | Age: 61
End: 2021-05-13

## 2021-07-13 ENCOUNTER — MYC REFILL (OUTPATIENT)
Dept: FAMILY MEDICINE | Facility: CLINIC | Age: 61
End: 2021-07-13

## 2021-07-13 DIAGNOSIS — F41.1 GENERALIZED ANXIETY DISORDER: ICD-10-CM

## 2021-07-13 DIAGNOSIS — F51.01 PRIMARY INSOMNIA: ICD-10-CM

## 2021-07-15 RX ORDER — CLONAZEPAM 0.5 MG/1
0.25-0.5 TABLET ORAL
Qty: 30 TABLET | Refills: 0 | Status: SHIPPED | OUTPATIENT
Start: 2021-07-15 | End: 2022-01-12

## 2021-07-15 NOTE — TELEPHONE ENCOUNTER
Routing refill request to provider for review/approval because:  Drug not on the [facility] refill protocol       Last Written Prescription Date:  1/12/21  Last Fill Quantity: 30,  # refills: 0   Last office visit: 4/12/2021 with prescribing provider:     Future Office Visit:   Next 5 appointments (look out 90 days)    Jul 16, 2021 10:00 AM  Nurse Only with [facility]  [facility] ([facility] ) [address] [place] [id number] [phone]

## 2021-07-16 ENCOUNTER — ALLIED HEALTH/NURSE VISIT (OUTPATIENT)
Dept: NURSING | Facility: CLINIC | Age: 61
End: 2021-07-16
Payer: COMMERCIAL

## 2021-07-16 VITALS — DIASTOLIC BLOOD PRESSURE: 74 MMHG | HEART RATE: 60 BPM | OXYGEN SATURATION: 97 % | SYSTOLIC BLOOD PRESSURE: 104 MMHG

## 2021-07-16 DIAGNOSIS — Z01.30 BP CHECK: Primary | ICD-10-CM

## 2021-07-16 PROCEDURE — 99207 PR NO CHARGE NURSE ONLY: CPT

## 2021-07-16 NOTE — NURSING NOTE
Gisela Pak is a 61 year old patient who comes in today for a Blood Pressure check.  Initial BP:  /74 (BP Location: Left arm, Patient Position: Sitting, Cuff Size: Adult Large)   Pulse 60   LMP 09/14/2011   SpO2 97%      61  Disposition: follow-up as previously indicated by provider    Dennise Wolf CMA

## 2021-08-12 ENCOUNTER — TRANSFERRED RECORDS (OUTPATIENT)
Dept: HEALTH INFORMATION MANAGEMENT | Facility: CLINIC | Age: 61
End: 2021-08-12

## 2021-08-19 DIAGNOSIS — M79.671 RIGHT FOOT PAIN: Primary | ICD-10-CM

## 2021-09-04 ENCOUNTER — HEALTH MAINTENANCE LETTER (OUTPATIENT)
Age: 61
End: 2021-09-04

## 2021-11-29 ENCOUNTER — IMMUNIZATION (OUTPATIENT)
Dept: NURSING | Facility: CLINIC | Age: 61
End: 2021-11-29
Payer: COMMERCIAL

## 2021-11-29 PROCEDURE — 0064A COVID-19,PF,MODERNA (18+ YRS BOOSTER .25ML): CPT

## 2021-11-29 PROCEDURE — 91306 COVID-19,PF,MODERNA (18+ YRS BOOSTER .25ML): CPT

## 2022-01-11 ASSESSMENT — ANXIETY QUESTIONNAIRES
5. BEING SO RESTLESS THAT IT IS HARD TO SIT STILL: SEVERAL DAYS
7. FEELING AFRAID AS IF SOMETHING AWFUL MIGHT HAPPEN: SEVERAL DAYS
4. TROUBLE RELAXING: MORE THAN HALF THE DAYS
1. FEELING NERVOUS, ANXIOUS, OR ON EDGE: SEVERAL DAYS
GAD7 TOTAL SCORE: 8
6. BECOMING EASILY ANNOYED OR IRRITABLE: SEVERAL DAYS
GAD7 TOTAL SCORE: 8
7. FEELING AFRAID AS IF SOMETHING AWFUL MIGHT HAPPEN: SEVERAL DAYS
GAD7 TOTAL SCORE: 8
2. NOT BEING ABLE TO STOP OR CONTROL WORRYING: SEVERAL DAYS
3. WORRYING TOO MUCH ABOUT DIFFERENT THINGS: SEVERAL DAYS

## 2022-01-11 ASSESSMENT — PATIENT HEALTH QUESTIONNAIRE - PHQ9
SUM OF ALL RESPONSES TO PHQ QUESTIONS 1-9: 10
10. IF YOU CHECKED OFF ANY PROBLEMS, HOW DIFFICULT HAVE THESE PROBLEMS MADE IT FOR YOU TO DO YOUR WORK, TAKE CARE OF THINGS AT HOME, OR GET ALONG WITH OTHER PEOPLE: SOMEWHAT DIFFICULT
SUM OF ALL RESPONSES TO PHQ QUESTIONS 1-9: 10

## 2022-01-12 ENCOUNTER — MYC MEDICAL ADVICE (OUTPATIENT)
Dept: FAMILY MEDICINE | Facility: CLINIC | Age: 62
End: 2022-01-12

## 2022-01-12 ENCOUNTER — VIRTUAL VISIT (OUTPATIENT)
Dept: FAMILY MEDICINE | Facility: CLINIC | Age: 62
End: 2022-01-12
Payer: COMMERCIAL

## 2022-01-12 DIAGNOSIS — R42 DYSEQUILIBRIUM: Primary | ICD-10-CM

## 2022-01-12 DIAGNOSIS — F41.1 GENERALIZED ANXIETY DISORDER: ICD-10-CM

## 2022-01-12 DIAGNOSIS — F51.01 PRIMARY INSOMNIA: ICD-10-CM

## 2022-01-12 PROCEDURE — 99215 OFFICE O/P EST HI 40 MIN: CPT | Mod: 95 | Performed by: FAMILY MEDICINE

## 2022-01-12 PROCEDURE — 96127 BRIEF EMOTIONAL/BEHAV ASSMT: CPT | Mod: 95 | Performed by: FAMILY MEDICINE

## 2022-01-12 RX ORDER — CLONAZEPAM 0.5 MG/1
0.25-0.5 TABLET ORAL
Qty: 30 TABLET | Refills: 0 | Status: SHIPPED | OUTPATIENT
Start: 2022-01-12 | End: 2022-08-22

## 2022-01-12 RX ORDER — FEXOFENADINE HCL 180 MG/1
180 TABLET ORAL DAILY
COMMUNITY
End: 2022-08-22

## 2022-01-12 ASSESSMENT — PATIENT HEALTH QUESTIONNAIRE - PHQ9: SUM OF ALL RESPONSES TO PHQ QUESTIONS 1-9: 10

## 2022-01-12 ASSESSMENT — ANXIETY QUESTIONNAIRES: GAD7 TOTAL SCORE: 8
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
## 2022-03-16 NOTE — PATIENT INSTRUCTIONS
I kindly request that you check your MyChart prior to all appointments with me and complete any assigned questionnaires ahead of time.      FYI:  I do virtual (video) visits exclusively on Wednesdays.  I still do in-person visits at [facility] ([phone]) on Mondays, Tuesdays and Thursdays.  You can schedule a video visit for many conditions.  Please follow this link:  [email or web address]     Distance Of Undermining In Cm (Required): 5.5

## 2022-04-11 ENCOUNTER — ALLIED HEALTH/NURSE VISIT (OUTPATIENT)
Dept: FAMILY MEDICINE | Facility: CLINIC | Age: 62
End: 2022-04-11
Payer: COMMERCIAL

## 2022-04-11 VITALS — SYSTOLIC BLOOD PRESSURE: 115 MMHG | OXYGEN SATURATION: 98 % | DIASTOLIC BLOOD PRESSURE: 79 MMHG | HEART RATE: 87 BPM

## 2022-04-11 DIAGNOSIS — Z01.30 BP CHECK: Primary | ICD-10-CM

## 2022-04-11 PROCEDURE — 99207 PR NO CHARGE NURSE ONLY: CPT

## 2022-04-11 NOTE — PROGRESS NOTES
Pt reports diastolic was running 90's on home machine so wanted to check out.       115/79  in clinic

## 2022-05-24 ENCOUNTER — ANCILLARY PROCEDURE (OUTPATIENT)
Dept: MAMMOGRAPHY | Facility: CLINIC | Age: 62
End: 2022-05-24
Attending: FAMILY MEDICINE
Payer: COMMERCIAL

## 2022-05-24 DIAGNOSIS — Z12.31 VISIT FOR SCREENING MAMMOGRAM: ICD-10-CM

## 2022-05-24 PROCEDURE — 77063 BREAST TOMOSYNTHESIS BI: CPT | Mod: GC | Performed by: RADIOLOGY

## 2022-05-24 PROCEDURE — 77067 SCR MAMMO BI INCL CAD: CPT | Mod: GC | Performed by: RADIOLOGY

## 2022-05-26 NOTE — RESULT ENCOUNTER NOTE
Gray Higgins,  I am happy to see that you completed your mammogram and that your result is normal!  Mel Beavers MD

## 2022-06-01 ENCOUNTER — OFFICE VISIT (OUTPATIENT)
Dept: NEUROLOGY | Facility: CLINIC | Age: 62
End: 2022-06-01
Attending: FAMILY MEDICINE
Payer: COMMERCIAL

## 2022-06-01 VITALS
OXYGEN SATURATION: 99 % | DIASTOLIC BLOOD PRESSURE: 85 MMHG | RESPIRATION RATE: 16 BRPM | HEART RATE: 85 BPM | SYSTOLIC BLOOD PRESSURE: 123 MMHG | BODY MASS INDEX: 31.99 KG/M2 | WEIGHT: 182 LBS

## 2022-06-01 DIAGNOSIS — I87.1 COMPRESSION OF VEIN: Primary | ICD-10-CM

## 2022-06-01 DIAGNOSIS — R51.9 PRESSURE IN HEAD: ICD-10-CM

## 2022-06-01 DIAGNOSIS — M62.838 MUSCLE SPASM: ICD-10-CM

## 2022-06-01 DIAGNOSIS — R42 DYSEQUILIBRIUM: ICD-10-CM

## 2022-06-01 DIAGNOSIS — G54.0 TOS (THORACIC OUTLET SYNDROME): ICD-10-CM

## 2022-06-01 PROCEDURE — 99215 OFFICE O/P EST HI 40 MIN: CPT | Performed by: PSYCHIATRY & NEUROLOGY

## 2022-06-01 NOTE — Clinical Note
6/1/2022       RE: Gisela Pak  [address]  [facility] [id number]     Dear Colleague,    Thank you for referring your patient, Gisela Pak, to the [facility]. Please see a copy of my visit note below.    No notes on file    Again, thank you for allowing me to participate in the care of your patient.      Sincerely,    Alana MARIN Cha, MD
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
## 2022-06-07 ENCOUNTER — TELEPHONE (OUTPATIENT)
Dept: NEUROLOGY | Facility: CLINIC | Age: 62
End: 2022-06-07
Payer: COMMERCIAL

## 2022-06-07 NOTE — TELEPHONE ENCOUNTER
[facility] Call Center    Phone Message    May a detailed message be left on voicemail: yes     Reason for Call: Other: pt calling because she has a CT with contrast scheduled on 6/9, she would like to know if the contrast is necessary and can she have the CT without it. Please call back to advise.    Action Taken: Other: neurology    Travel Screening: Not Applicable

## 2022-06-08 NOTE — TELEPHONE ENCOUNTER
[facility] Call Center    Phone Message    May a detailed message be left on voicemail: yes     Reason for Call: Other: pt returning call to Tamara. Pt says her phone was recently updated and could possibly be sending Tamara calls to [facility]. Maybe try to call from another number to get through to pt.    Action Taken: Other: neurology    Travel Screening: Not Applicable

## 2022-06-08 NOTE — TELEPHONE ENCOUNTER
I returned a call to Gisela and had to leave a voice message.  I asked that she call me back to discuss her request for a CT without contrast.

## 2022-06-08 NOTE — TELEPHONE ENCOUNTER
[facility] Call Center    Phone Message    May a detailed message be left on voicemail: yes     Reason for Call: Other: Pt retirned missed call from EBEN Mcdonald.      Please call Pt back at [phone].    Action Taken: Message routed to:  [facility] Neurology    Travel Screening: Not Applicable

## 2022-06-09 ENCOUNTER — TELEPHONE (OUTPATIENT)
Dept: NEUROLOGY | Facility: CLINIC | Age: 62
End: 2022-06-09

## 2022-06-09 ENCOUNTER — ANCILLARY PROCEDURE (OUTPATIENT)
Dept: ULTRASOUND IMAGING | Facility: CLINIC | Age: 62
End: 2022-06-09
Attending: PSYCHIATRY & NEUROLOGY
Payer: COMMERCIAL

## 2022-06-09 DIAGNOSIS — G54.0 TOS (THORACIC OUTLET SYNDROME): ICD-10-CM

## 2022-06-09 DIAGNOSIS — I87.1 COMPRESSION OF VEIN: ICD-10-CM

## 2022-06-09 DIAGNOSIS — M62.838 MUSCLE SPASM: ICD-10-CM

## 2022-06-09 DIAGNOSIS — R42 DYSEQUILIBRIUM: ICD-10-CM

## 2022-06-09 PROCEDURE — 93970 EXTREMITY STUDY: CPT | Performed by: RADIOLOGY

## 2022-06-09 PROCEDURE — 93930 UPPER EXTREMITY STUDY: CPT | Performed by: RADIOLOGY

## 2022-06-11 ENCOUNTER — HEALTH MAINTENANCE LETTER (OUTPATIENT)
Age: 62
End: 2022-06-11

## 2022-06-23 ENCOUNTER — ANCILLARY PROCEDURE (OUTPATIENT)
Dept: CT IMAGING | Facility: CLINIC | Age: 62
End: 2022-06-23
Attending: PSYCHIATRY & NEUROLOGY
Payer: COMMERCIAL

## 2022-06-23 DIAGNOSIS — R42 DYSEQUILIBRIUM: ICD-10-CM

## 2022-06-23 DIAGNOSIS — I87.1 COMPRESSION OF VEIN: ICD-10-CM

## 2022-06-23 DIAGNOSIS — R51.9 PRESSURE IN HEAD: ICD-10-CM

## 2022-06-23 DIAGNOSIS — G54.0 TOS (THORACIC OUTLET SYNDROME): ICD-10-CM

## 2022-06-23 DIAGNOSIS — M62.838 MUSCLE SPASM: ICD-10-CM

## 2022-06-23 PROCEDURE — 70496 CT ANGIOGRAPHY HEAD: CPT | Mod: GC | Performed by: RADIOLOGY

## 2022-06-23 PROCEDURE — 70498 CT ANGIOGRAPHY NECK: CPT | Mod: GC | Performed by: RADIOLOGY

## 2022-06-23 RX ORDER — IOPAMIDOL 755 MG/ML
68 INJECTION, SOLUTION INTRAVASCULAR ONCE
Status: COMPLETED | OUTPATIENT
Start: 2022-06-23 | End: 2022-06-23

## 2022-06-23 RX ADMIN — IOPAMIDOL 68 ML: 755 INJECTION, SOLUTION INTRAVASCULAR at 10:11

## 2022-07-13 ENCOUNTER — TELEPHONE (OUTPATIENT)
Dept: NEUROLOGY | Facility: CLINIC | Age: 62
End: 2022-07-13

## 2022-07-29 ENCOUNTER — VIRTUAL VISIT (OUTPATIENT)
Dept: NEUROLOGY | Facility: CLINIC | Age: 62
End: 2022-07-29
Payer: COMMERCIAL

## 2022-07-29 DIAGNOSIS — M62.838 MUSCLE SPASM: ICD-10-CM

## 2022-07-29 DIAGNOSIS — I87.1 COMPRESSION OF VEIN: ICD-10-CM

## 2022-07-29 DIAGNOSIS — G54.0 TOS (THORACIC OUTLET SYNDROME): ICD-10-CM

## 2022-07-29 DIAGNOSIS — M24.20 EAGLE'S SYNDROME: Primary | ICD-10-CM

## 2022-07-29 DIAGNOSIS — R51.9 PRESSURE IN HEAD: ICD-10-CM

## 2022-07-29 PROCEDURE — 99215 OFFICE O/P EST HI 40 MIN: CPT | Mod: 95 | Performed by: PSYCHIATRY & NEUROLOGY

## 2022-07-29 NOTE — LETTER
7/29/2022       RE: Gisela Pak  [address]  [facility] [address]     Dear Colleague,    Thank you for referring your patient, Gisela Pak, to the [facility] NEUROLOGY CLINIC [place] at [facility]. Please see a copy of my visit note below.    Follow-up 6-1-22:  Gisela Pak is a 62 year old female with a past medical history of severe head pressure worse being reclined, myofascial pain, and imbalance with veering to the left. The balance issues have been present since Feb 2021. Headaches have been present since her 20's. She had a normal exam on 6-1-22 except for tenderness over the anterior scalenes and sternoclavicular junction, worse on the right side.   Head pressure that wakes her up in the morning and is better with being upright was worrisome for raised intracranial pressure. Given that she has right sided predominant thoracic outlet tenderness, we worked her up for venous compression in the neck and upper shoulder. Non-contrast brain MRI was previously normal.     Interim History 7-29-22:    Ultrasound 6-9-22:  1. RIGHT Subclavian: A. 8.79 velocity ratio from the lateral to mid subclavian vein suggests stenosis.    CTV Head and Neck 6-23-22:  In neutral position, the upper internal jugular veins demonstrate mild narrowing narrowing bilaterally. With neck flexion, there is moderate narrowing of the upper left internal jugular vein. Near complete effacement of the right internal jugular vein in flexion between the digastric and C2 transverse process.    Patient was better in April and May but symptoms have been worse in the last couple of months. Symptoms are worse with weather changes, mostly related with humidity and barometric pressures. She was just in [place] and she was almost asymptomatic. The weather was very even. Going up elevation to 3000 feet, she became nauseated but it didn't make the headache worse. The weather  changes in general can cause head pressure. She does not have a problem with flying. Symptoms can be triggered by forward head flexion.     Pathophysiology reviewed with both internal jugular veins narrowed with forward head flexion and some evidence of right sided subclavian vein narrowing. We discussed the need for ergonomic awareness and the role of physical therapy.     Plan:  1. 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 I've instructed her on how to look for a provider in her area.   2. Physical therapy for the subclavian vein compression. Therapy requested.   3. Follow-up 4 months    DATA:  I personally reviewed the following data.    Last brain imaging:  CTV HEAD NECK W CONTRAST 6/23/2022  History:  62F with severe head pressure, imbalance, questions venous  stenosis, styloid length.; Dysequilibrium; Compression of vein; TOS  (thoracic outlet syndrome); Muscle spasm; Pressure in head    Comparison: 4/27/2021 brain MR, 6/9/2022 ultrasound    Technique: Post intravenous contrast imaging was obtained of the head  and neck with thin sections from the vertex through the lung apices  with a delay for venogram purposes. Patient was imaged in the neutral  position(s). Images were reviewed on the 3D workstation and  manipulated.    Findings:    HEAD:  Head CTV demonstrates patent major dural and deep intracranial venous  sinuses with no evidence of thrombosis.    NECK  Patent major cervical veins with no evidence of thrombosis.     The styloid process  on the RIGHT measures 2.3 cm.   The styloid process on the LEFT measures 2.3 cm.    NEUTRAL POSITION:    The RIGHT upper internal jugular vein, anterior to the C1 transverse  process, demonstrates mild narrowing.     The LEFT upper internal jugular vein, anterior to the C1 transverse  process, demonstrates mild narrowing.     The mid and lower internal jugular veins are unremarkable.    FLEXED  POSITION:  The RIGHT upper internal jugular vein, anterior to the C1 transverse  process, demonstrates moderate narrowing.     The LEFT upper internal jugular vein, anterior to the C1 transverse  process, demonstrates moderate narrowing.     Near complete effacement of the cervical segment of the right internal  jugular as it intersperses between the posterior belly of the  digastric and the transverse process of C2. The remaining mid and  lower internal jugular veins are unremarkable.    The visualized lung apices appear unremarkable.   Impression: Impression:    1.  CTV of the head reveals patent major intracranial veins and dural  venous sinuses with no evidence of thrombus.  2.  CTV of the neck reveals patent major cervical veins with no  evidence of thrombus.   3. In neutral position, the upper internal jugular veins demonstrate  mild narrowing narrowing bilaterally. With neck flexion, there is  moderate narrowing of the upper left internal jugular vein.   4. Near complete effacement of the right internal jugular vein in  flexion between the digastric and C2 transverse process. Findings are  of indeterminate clinical significance.    I have personally reviewed the examination and initial interpretation  and I agree with the findings.    THERESA SAMUEL MD             48-minutes were spent in evaluation and counseling as well as documentation on the date of service. After a review of the patient s situation, this visit was changed from an in-person visit to a video visit to reduce the risk of COVID-19 exposure.          Sincerely,    Alana MARIN Cha, MD

## 2022-07-29 NOTE — PROGRESS NOTES
The patient is being evaluated via a billable video visit.  How would you like to obtain your AVS? MyChart  If the video visit is dropped, the invitation should be resent by: Send to e-mail at: [email or web address]  Will anyone else be joining your video visit? No      Video-Visit Details  Type of service:  Video Visit  Video Start Time:2:10 PM  Video End Time:2:46 PM  Originating Location (pt. Location): Home  Distant Location (provider location):  [facility] NEUROLOGY [facility]   Platform used for Video Visit: [facility]    Follow-up 6-1-22:  Gisela Pak is a 62 year old female with a past medical history of severe head pressure worse being reclined, myofascial pain, and imbalance with veering to the left. The balance issues have been present since Feb 2021. Headaches have been present since her 20's. She had a normal exam on 6-1-22 except for tenderness over the anterior scalenes and sternoclavicular junction, worse on the right side.   Head pressure that wakes her up in the morning and is better with being upright was worrisome for raised intracranial pressure. Given that she has right sided predominant thoracic outlet tenderness, we worked her up for venous compression in the neck and upper shoulder. Non-contrast brain MRI was previously normal.     Interim History 7-29-22:    Ultrasound 6-9-22:  1. RIGHT Subclavian: A. 8.79 velocity ratio from the lateral to mid subclavian vein suggests stenosis.    CTV Head and Neck 6-23-22:  In neutral position, the upper internal jugular veins demonstrate mild narrowing narrowing bilaterally. With neck flexion, there is moderate narrowing of the upper left internal jugular vein. Near complete effacement of the right internal jugular vein in flexion between the digastric and C2 transverse process.    Patient was better in April and May but symptoms have been worse in the last couple of months. Symptoms are worse with weather changes, mostly related with  humidity and barometric pressures. She was just in [place] and she was almost asymptomatic. The weather was very even. Going up elevation to 3000 feet, she became nauseated but it didn't make the headache worse. The weather changes in general can cause head pressure. She does not have a problem with flying. Symptoms can be triggered by forward head flexion.     Pathophysiology reviewed with both internal jugular veins narrowed with forward head flexion and some evidence of right sided subclavian vein narrowing. We discussed the need for ergonomic awareness and the role of physical therapy.     Plan:  1. 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 I've instructed her on how to look for a provider in her area.   2. Physical therapy for the subclavian vein compression. Therapy requested.   3. Follow-up 4 months    DATA:  I personally reviewed the following data.    Last brain imaging:  CTV HEAD NECK W CONTRAST 6/23/2022  History:  62F with severe head pressure, imbalance, questions venous  stenosis, styloid length.; Dysequilibrium; Compression of vein; TOS  (thoracic outlet syndrome); Muscle spasm; Pressure in head    Comparison: 4/27/2021 brain MR, 6/9/2022 ultrasound    Technique: Post intravenous contrast imaging was obtained of the head  and neck with thin sections from the vertex through the lung apices  with a delay for venogram purposes. Patient was imaged in the neutral  position(s). Images were reviewed on the 3D workstation and  manipulated.    Findings:    HEAD:  Head CTV demonstrates patent major dural and deep intracranial venous  sinuses with no evidence of thrombosis.    NECK  Patent major cervical veins with no evidence of thrombosis.     The styloid process  on the RIGHT measures 2.3 cm.   The styloid process on the LEFT measures 2.3 cm.    NEUTRAL POSITION:    The RIGHT upper internal jugular vein, anterior to the C1 transverse  process,  demonstrates mild narrowing.     The LEFT upper internal jugular vein, anterior to the C1 transverse  process, demonstrates mild narrowing.     The mid and lower internal jugular veins are unremarkable.    FLEXED POSITION:  The RIGHT upper internal jugular vein, anterior to the C1 transverse  process, demonstrates moderate narrowing.     The LEFT upper internal jugular vein, anterior to the C1 transverse  process, demonstrates moderate narrowing.     Near complete effacement of the cervical segment of the right internal  jugular as it intersperses between the posterior belly of the  digastric and the transverse process of C2. The remaining mid and  lower internal jugular veins are unremarkable.    The visualized lung apices appear unremarkable.   Impression: Impression:    1.  CTV of the head reveals patent major intracranial veins and dural  venous sinuses with no evidence of thrombus.  2.  CTV of the neck reveals patent major cervical veins with no  evidence of thrombus.   3. In neutral position, the upper internal jugular veins demonstrate  mild narrowing narrowing bilaterally. With neck flexion, there is  moderate narrowing of the upper left internal jugular vein.   4. Near complete effacement of the right internal jugular vein in  flexion between the digastric and C2 transverse process. Findings are  of indeterminate clinical significance.    I have personally reviewed the examination and initial interpretation  and I agree with the findings.    THERESA SAMUEL MD             48-minutes were spent in evaluation and counseling as well as documentation on the date of service. After a review of the patient s situation, this visit was changed from an in-person visit to a video visit to reduce the risk of COVID-19 exposure.

## 2022-07-29 NOTE — Clinical Note
July 29, 2022       TO: Gisela Pak  [address]  [facility] [address]       DearMs.Pasha,    We are writing to inform you of your test results.    {p results letter list:[id number]}    No results found from the In Basket message.    ***

## 2022-07-29 NOTE — LETTER
7/29/2022       RE: Gisela Pak  [address]  [facility] [place] [address]     Dear Colleague,    Thank you for referring your patient, Gisela Pak, to the [facility] [place] at [facility]. Please see a copy of my visit note below.    Follow-up 6-1-22:  Gisela Pak is a 62 year old female with a past medical history of severe head pressure worse being reclined, myofascial pain, and imbalance with veering to the left. The balance issues have been present since Feb 2021. Headaches have been present since her 20's. She had a normal exam on 6-1-22 except for tenderness over the anterior scalenes and sternoclavicular junction, worse on the right side.   Head pressure that wakes her up in the morning and is better with being upright was worrisome for raised intracranial pressure. Given that she has right sided predominant thoracic outlet tenderness, we worked her up for venous compression in the neck and upper shoulder. Non-contrast brain MRI was previously normal.     Interim History 7-29-22:    Ultrasound 6-9-22:  1. RIGHT Subclavian: A. 8.79 velocity ratio from the lateral to mid subclavian vein suggests stenosis.    CTV Head and Neck 6-23-22:  In neutral position, the upper internal jugular veins demonstrate mild narrowing narrowing bilaterally. With neck flexion, there is moderate narrowing of the upper left internal jugular vein. Near complete effacement of the right internal jugular vein in flexion between the digastric and C2 transverse process.    Patient was better in April and May but symptoms have been worse in the last couple of months. Symptoms are worse with weather changes, mostly related with humidity and barometric pressures. She was just in [place] and she was almost asymptomatic. The weather was very even. Going up elevation to 3000 feet, she became nauseated but it didn't make the headache worse. The weather  changes in general can cause head pressure. She does not have a problem with flying. Symptoms can be triggered by forward head flexion.     Pathophysiology reviewed with both internal jugular veins narrowed with forward head flexion and some evidence of right sided subclavian vein narrowing. We discussed the need for ergonomic awareness and the role of physical therapy.     Plan:  1. 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 I've instructed her on how to look for a provider in her area.   2. Physical therapy for the subclavian vein compression. Therapy requested.   3. Follow-up 4 months    DATA:  I personally reviewed the following data.    Last brain imaging:  CTV HEAD NECK W CONTRAST 6/23/2022  History:  62F with severe head pressure, imbalance, questions venous  stenosis, styloid length.; Dysequilibrium; Compression of vein; TOS  (thoracic outlet syndrome); Muscle spasm; Pressure in head    Comparison: 4/27/2021 brain MR, 6/9/2022 ultrasound    Technique: Post intravenous contrast imaging was obtained of the head  and neck with thin sections from the vertex through the lung apices  with a delay for venogram purposes. Patient was imaged in the neutral  position(s). Images were reviewed on the 3D workstation and  manipulated.    Findings:    HEAD:  Head CTV demonstrates patent major dural and deep intracranial venous  sinuses with no evidence of thrombosis.    NECK  Patent major cervical veins with no evidence of thrombosis.     The styloid process  on the RIGHT measures 2.3 cm.   The styloid process on the LEFT measures 2.3 cm.    NEUTRAL POSITION:    The RIGHT upper internal jugular vein, anterior to the C1 transverse  process, demonstrates mild narrowing.     The LEFT upper internal jugular vein, anterior to the C1 transverse  process, demonstrates mild narrowing.     The mid and lower internal jugular veins are unremarkable.    FLEXED  POSITION:  The RIGHT upper internal jugular vein, anterior to the C1 transverse  process, demonstrates moderate narrowing.     The LEFT upper internal jugular vein, anterior to the C1 transverse  process, demonstrates moderate narrowing.     Near complete effacement of the cervical segment of the right internal  jugular as it intersperses between the posterior belly of the  digastric and the transverse process of C2. The remaining mid and  lower internal jugular veins are unremarkable.    The visualized lung apices appear unremarkable.   Impression: Impression:    1.  CTV of the head reveals patent major intracranial veins and dural  venous sinuses with no evidence of thrombus.  2.  CTV of the neck reveals patent major cervical veins with no  evidence of thrombus.   3. In neutral position, the upper internal jugular veins demonstrate  mild narrowing narrowing bilaterally. With neck flexion, there is  moderate narrowing of the upper left internal jugular vein.   4. Near complete effacement of the right internal jugular vein in  flexion between the digastric and C2 transverse process. Findings are  of indeterminate clinical significance.    I have personally reviewed the examination and initial interpretation  and I agree with the findings.    THERESA SAMUEL MD             48-minutes were spent in evaluation and counseling as well as documentation on the date of service. After a review of the patient s situation, this visit was changed from an in-person visit to a video visit to reduce the risk of COVID-19 exposure.        Sincerely,    Alana MARIN Cha, MD

## 2022-08-03 ENCOUNTER — TELEPHONE (OUTPATIENT)
Dept: NEUROLOGY | Facility: CLINIC | Age: 62
End: 2022-08-03

## 2022-08-22 ENCOUNTER — OFFICE VISIT (OUTPATIENT)
Dept: FAMILY MEDICINE | Facility: CLINIC | Age: 62
End: 2022-08-22
Payer: COMMERCIAL

## 2022-08-22 VITALS
SYSTOLIC BLOOD PRESSURE: 124 MMHG | WEIGHT: 176 LBS | DIASTOLIC BLOOD PRESSURE: 84 MMHG | HEIGHT: 63 IN | OXYGEN SATURATION: 99 % | HEART RATE: 77 BPM | BODY MASS INDEX: 31.18 KG/M2 | TEMPERATURE: 98.1 F

## 2022-08-22 DIAGNOSIS — F41.1 GENERALIZED ANXIETY DISORDER: Primary | ICD-10-CM

## 2022-08-22 DIAGNOSIS — R12 HEARTBURN: ICD-10-CM

## 2022-08-22 DIAGNOSIS — F51.01 PRIMARY INSOMNIA: ICD-10-CM

## 2022-08-22 DIAGNOSIS — Z12.11 SCREEN FOR COLON CANCER: ICD-10-CM

## 2022-08-22 PROCEDURE — 99214 OFFICE O/P EST MOD 30 MIN: CPT | Performed by: FAMILY MEDICINE

## 2022-08-22 RX ORDER — TRETINOIN 0.5 MG/G
CREAM TOPICAL
COMMUNITY
Start: 2022-08-04

## 2022-08-22 RX ORDER — CLONAZEPAM 0.5 MG/1
0.25-0.5 TABLET ORAL
Qty: 30 TABLET | Refills: 0 | Status: SHIPPED | OUTPATIENT
Start: 2022-08-22 | End: 2023-11-28

## 2022-08-22 ASSESSMENT — ANXIETY QUESTIONNAIRES
GAD7 TOTAL SCORE: 1
7. FEELING AFRAID AS IF SOMETHING AWFUL MIGHT HAPPEN: NOT AT ALL
1. FEELING NERVOUS, ANXIOUS, OR ON EDGE: NOT AT ALL
6. BECOMING EASILY ANNOYED OR IRRITABLE: NOT AT ALL
2. NOT BEING ABLE TO STOP OR CONTROL WORRYING: NOT AT ALL
5. BEING SO RESTLESS THAT IT IS HARD TO SIT STILL: NOT AT ALL
GAD7 TOTAL SCORE: 1
GAD7 TOTAL SCORE: 1
IF YOU CHECKED OFF ANY PROBLEMS ON THIS QUESTIONNAIRE, HOW DIFFICULT HAVE THESE PROBLEMS MADE IT FOR YOU TO DO YOUR WORK, TAKE CARE OF THINGS AT HOME, OR GET ALONG WITH OTHER PEOPLE: NOT DIFFICULT AT ALL
3. WORRYING TOO MUCH ABOUT DIFFERENT THINGS: NOT AT ALL
7. FEELING AFRAID AS IF SOMETHING AWFUL MIGHT HAPPEN: NOT AT ALL
4. TROUBLE RELAXING: SEVERAL DAYS
8. IF YOU CHECKED OFF ANY PROBLEMS, HOW DIFFICULT HAVE THESE MADE IT FOR YOU TO DO YOUR WORK, TAKE CARE OF THINGS AT HOME, OR GET ALONG WITH OTHER PEOPLE?: NOT DIFFICULT AT ALL

## 2022-08-22 ASSESSMENT — PATIENT HEALTH QUESTIONNAIRE - PHQ9
SUM OF ALL RESPONSES TO PHQ QUESTIONS 1-9: 3
SUM OF ALL RESPONSES TO PHQ QUESTIONS 1-9: 3

## 2022-08-22 NOTE — PROGRESS NOTES
Assessment & Plan     Generalized anxiety disorder  Primary insomnia  Uses benzo sparingly - last received #30 tabs 8 months ago.  I checked the [place] Prescription Monitoring Program website which showed no concerning activity.  She finds travel particularly stressful.    - clonazePAM (KLONOPIN) 0.5 MG tablet  Dispense: 30 tablet; Refill: 0    Heartburn   I recommended H2 blocker OTC.  Discussed GERD precautions - see patient ed material    Screen for colon cancer  - Colonscopy Screening  Referral      Patient Instructions   You can use famotidine (Pepcid) over-the-counter as needed for heartburn.  Avoid proton pump inhibitors which include Prilosec, Nexium, Prevacid.    If you ever want to do talk therapy you can schedule with Ronaldo Diaz, Behavioral Health Consultant at [facility].    If you have MyChart:  1) I kindly request that you check your MyChart prior to all appointments with me and complete any assigned questionnaires ahead of time.    2) You may receive auto-released results from our system before I have the opportunity to review and comment.  Be assured I will review and comment on all of your results as soon as I can.        FYI:  1) I do virtual (video) visits exclusively on Wednesdays.  I still do in-person visits at [facility] ([phone]) on Mondays, Tuesdays and Thursdays.  You can schedule a video visit for many conditions.  Please follow this link:  [email or web address]  2) My schedule has been booking out very far in advance (2 months).  I apologize for the lack of timely access.  If you need to be seen for a chronic condition or preventive (wellness) visit, please be sure to schedule that appointment 2-3 months in advance.  If you have a concern that you feel cannot wait until my next available appointment (such as a hospital follow-up or new symptom of concern) please ask to speak to one of  the [facility] nurses who may be able to access a sooner appointment.    I do ask that all patients who are taking chronic medications for conditions that I am managing schedule an in-person visit with me at least once a year.     To schedule any ordered imaging studies (including mammogram and/or DEXA scan) you can call [facility] Scheduling at [phone].          Return in about 2 months (around 10/22/2022) for routine preventive care, with me (Dr. Beavers), in person - come fasting.    Mel Beavers MD  [facility]        Tania Higgins is a 62 year old, presenting for the following health issues:  Medication Follow-up (Refills and discuss neurology appt. For dizziness)      History of Present Illness       Reason for visit:  Med refill, check in about balance issues    She eats 2-3 servings of fruits and vegetables daily.She consumes 0 sweetened beverage(s) daily.She exercises with enough effort to increase her heart rate 60 or more minutes per day.  She exercises with enough effort to increase her heart rate 7 days per week.     Today's PHQ-9         PHQ-9 Total Score: 3    PHQ-9 Q9 Thoughts of better off dead/self-harm past 2 weeks :   Not at all      Today's KIRSTIN-7 Score: 1       Anxiety Follow-Up    How are you doing with your anxiety since your last visit? No change    Are you having other symptoms that might be associated with anxiety? No    Have you had a significant life event? No     Are you feeling depressed? No    Do you have any concerns with your use of alcohol or other drugs? No    Social History     Tobacco Use     Smoking status: Never Smoker     Smokeless tobacco: Never Used     Tobacco comment: a little in college   Vaping Use     Vaping Use: Never used   Substance Use Topics     Alcohol use: Yes     Alcohol/week: 0.0 standard drinks     Comment: a glass of wine with dinner every once in a while     Drug use: No     KIRSTIN-7 SCORE 1/12/2021  "1/11/2022 8/22/2022   Total Score - - -   Total Score 5 (mild anxiety) 8 (mild anxiety) 1 (minimal anxiety)   Total Score 5 8 1     PHQ 1/12/2021 1/11/2022 8/22/2022   PHQ-9 Total Score 3 10 3   Q9: Thoughts of better off dead/self-harm past 2 weeks Not at all Not at all Not at all     Saw Dr. 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  She was referred to PT for the TOS and to [facility] therapy for atlas alignment.    Intermittent heartburn/reflux.  Wondering about best OTC options.        Review of Systems         Objective    /84   Pulse 77   Temp 98.1  F (36.7  C)   Ht 1.6 m (5' 3\")   Wt 79.8 kg (176 lb)   LMP 09/14/2011   SpO2 99%   BMI 31.18 kg/m    Body mass index is 31.18 kg/m .  Physical Exam   GEN:  no apparent distress  PSYCH:  Appearance/Behavior: patient is appropriately and casually dressed.  Speech:  normal  rate, rhythm, and tone.  Mood/Affect: Bright/congruent.  Insight: good                 .  ..  "

## 2022-08-22 NOTE — PATIENT INSTRUCTIONS
You can use famotidine (Pepcid) over-the-counter as needed for heartburn.  Avoid proton pump inhibitors which include Prilosec, Nexium, Prevacid.    If you ever want to do talk therapy you can schedule with Ronaldo Diaz, Behavioral Health Consultant at [facility].    If you have MyChart:  1) I kindly request that you check your MyChart prior to all appointments with me and complete any assigned questionnaires ahead of time.    2) You may receive auto-released results from our system before I have the opportunity to review and comment.  Be assured I will review and comment on all of your results as soon as I can.        FYI:  1) I do virtual (video) visits exclusively on Wednesdays.  I still do in-person visits at [facility] ([phone]) on Mondays, Tuesdays and Thursdays.  You can schedule a video visit for many conditions.  Please follow this link:  [email or web address]  2) My schedule has been booking out very far in advance (2 months).  I apologize for the lack of timely access.  If you need to be seen for a chronic condition or preventive (wellness) visit, please be sure to schedule that appointment 2-3 months in advance.  If you have a concern that you feel cannot wait until my next available appointment (such as a hospital follow-up or new symptom of concern) please ask to speak to one of the [facility] nurses who may be able to access a sooner appointment.    I do ask that all patients who are taking chronic medications for conditions that I am managing schedule an in-person visit with me at least once a year.     To schedule any ordered imaging studies (including mammogram and/or DEXA scan) you can call [facility] Scheduling at [phone].

## 2022-08-31 ENCOUNTER — THERAPY VISIT (OUTPATIENT)
Dept: PHYSICAL THERAPY | Facility: CLINIC | Age: 62
End: 2022-08-31
Attending: PSYCHIATRY & NEUROLOGY
Payer: COMMERCIAL

## 2022-08-31 DIAGNOSIS — M62.838 MUSCLE SPASM: ICD-10-CM

## 2022-08-31 DIAGNOSIS — R51.9 PRESSURE IN HEAD: ICD-10-CM

## 2022-08-31 DIAGNOSIS — M24.20 EAGLE'S SYNDROME: ICD-10-CM

## 2022-08-31 DIAGNOSIS — I87.1 COMPRESSION OF VEIN: ICD-10-CM

## 2022-08-31 DIAGNOSIS — G54.0 TOS (THORACIC OUTLET SYNDROME): ICD-10-CM

## 2022-08-31 PROCEDURE — 97110 THERAPEUTIC EXERCISES: CPT | Mod: GP | Performed by: PHYSICAL THERAPIST

## 2022-08-31 PROCEDURE — 97140 MANUAL THERAPY 1/> REGIONS: CPT | Mod: GP | Performed by: PHYSICAL THERAPIST

## 2022-08-31 PROCEDURE — 97161 PT EVAL LOW COMPLEX 20 MIN: CPT | Mod: GP | Performed by: PHYSICAL THERAPIST

## 2022-08-31 NOTE — PROGRESS NOTES
Physical Therapy Initial Evaluation  Subjective:  [facility]  Physical Therapy Initial Examination/Evaluation  August 31, 2022    Gisela Pak is a 62 year old  female referred to physical therapy by Dr. Barajas for treatment of TOS with Precautions/Restrictions/MD instructions none    KEY PT FINDINGS:  1.  Hypertonicity bilateral scalenes  2.  Decreased cervical spine ROM      Subjective:    DOI/onset: 2021  Mechanism of injury: insidious onset  DOS none  Previous treatment: none  Imaging: CT scan  Chief Complaint:   Floaters, fogginess   Pain: best 0 /10, worst 0/10 neck Described as: aching, not super painful Alleviated by: rest Frequency: intermittent Progression of symptoms since initial onset: staying the same Time of day when pain is worse: not related  Sleeping: sometimes affected      Occupation: retired  Job duties:  none    Current HEP/exercise regimen: none  Patient's goals are reduce sxs    Pertinent PMH: see epic   General Health Reported by Patient: good  Return to MD:  PRN         Objective:    Gait:  Per patient, feels unsteady  Gait Type:  Normal                         Cervical/Thoracic Evaluation    AROM:  AROM Cervical:    Flexion:            75%  Extension:       75%  Rotation:         Left: 75%     Right: 75%  Side Bend:      Left: 50%     Right:  50%        Cervical Myotomes:  normal                        Cervical Palpation:    Tenderness present at Left:    Scalenes; Upper Trap and Levator  Tenderness present at Right:    Scalenes; Upper Trap and Levator                                                  General     ROS        Assessment/Plan      Patient is a 62 year old female with cervical complaints.    Patient has the following significant findings with corresponding treatment plan.                Diagnosis 1:  TOS    Pain -  hot/cold therapy, US, electric stimulation, manual therapy, splint/taping/bracing/orthotics, self management,  education, and home program  Decreased ROM/flexibility - manual therapy and therapeutic exercise  Decreased joint mobility - manual therapy and therapeutic exercise  Decreased strength - therapeutic exercise and therapeutic activities  Impaired balance - neuro re-education and therapeutic activities  Decreased proprioception - neuro re-education and therapeutic activities  Inflammation - cold therapy  Edema - vasopneumatics  Impaired gait - gait training  Impaired muscle performance - neuro re-education  Decreased function - therapeutic activities    Therapy Evaluation Codes:   1) History comprised of:   Personal factors that impact the plan of care:      None.    Comorbidity factors that impact the plan of care are:      None.     Medications impacting care: None.  2) Examination of Body Systems comprised of:   Body structures and functions that impact the plan of care:      Cervical spine.   Activity limitations that impact the plan of care are:      Sitting.  3) Clinical presentation characteristics are:   Stable/Uncomplicated.  4) Decision-Making    Low complexity using standardized patient assessment instrument and/or measureable assessment of functional outcome.  Cumulative Therapy Evaluation is: Low complexity.    Previous and current functional limitations:  (See Goal Flow Sheet for this information)    Short term and Long term goals: (See Goal Flow Sheet for this information)     Communication ability:  Patient appears to be able to clearly communicate and understand verbal and written communication and follow directions correctly.  Treatment Explanation - The following has been discussed with the patient:   RX ordered/plan of care  Anticipated outcomes  Possible risks and side effects  This patient would benefit from PT intervention to resume normal activities.   Rehab potential is good.    Frequency:  1 X week, once daily  Duration:  for 6 weeks  Discharge Plan:  Achieve all LTG.  Independent in home  treatment program.  Reach maximal therapeutic benefit.    Please refer to the daily flowsheet for treatment today, total treatment time and time spent performing 1:1 timed codes.

## 2022-09-02 ENCOUNTER — HOSPITAL ENCOUNTER (OUTPATIENT)
Facility: AMBULATORY SURGERY CENTER | Age: 62
End: 2022-09-02
Attending: INTERNAL MEDICINE
Payer: COMMERCIAL

## 2022-09-02 ENCOUNTER — TELEPHONE (OUTPATIENT)
Dept: GASTROENTEROLOGY | Facility: CLINIC | Age: 62
End: 2022-09-02

## 2022-09-02 NOTE — TELEPHONE ENCOUNTER
Screening Questions    BlueKIND OF PREP RedLOCATION [review exclusion criteria] GreenSEDATION TYPE    Have you had a positive covid test in the last 90 days? N  If yes, what date?     Do you have a legal guardian or medical Power of ?  Are you able to give consent for your medical care? Y SELF (Sedation review/consideration needed)    1. Are you active on mychart? Y    2. What insurance is in the chart? BCBS     3.   Ordering/Referring Provider: ALONZO    4. BMI 30.7 [BMI OVER 40-EXTENDED PREP]  If greater than 40 review exclusion criteria [PAC APPT IF (MAC) @ [facility]]      5.  Respiratory Screening:  [If yes to any of the following HOSPITAL setting only]     N  Do you use daily home oxygen?   N  Do you have mod to severe Obstructive Sleep Apnea?  [(OKAY @ MG if pt is not on OXYGEN) [facility] SH PH RI]   N   Do you have Pulmonary Hypertension?    N   Do you have UNCONTROLLED asthma?         6.   N Have you had a heart or lung transplant?    _____________________________    7.   N Are you currently on dialysis? [ If yes, G-PREP & HOSPITAL setting only]     8.   N  Do you have chronic kidney disease? [ If yes, G-PREP ]    9.   N  Do you have a diagnosis of diabetes? [ If yes, G-PREP ]    10. N  On a regular basis do you go 3-5 days between bowel movements?  [ If yes, EXTENDED PREP.]  _____________________________    11.    N  Have you had a stroke or Transient ischemic attack (TIA - aka  mini stroke ) within 6 months? (If yes, please review exclusion criteria)          N In the past 6 months, have you had any heart related issues including cardiomyopathy or heart attack?           N If yes, did it require cardiac stenting or other implantable device?       12.   N  Do you have any implantable devices in your body (pacemaker, defib, LVAD)? (If yes, please review exclusion criteria)    13.   N Do you take nitroglycerin?            N If yes, how often?  (if yes, HOSPITAL setting ONLY)    14.  N  Are you currently  taking any blood thinners?           [IF YES, INFORM PATIENT TO FOLLOW UP W/ ORDERING PROVIDER FOR BRIDGING INSTRUCTIONS]     15.   N Do you take Phentermine?      Yes-> Hold for 7 days before procedure.  Please consult your prescribing provider if you have questions about holding this medication.      16.   [FEMALES] Are you currently pregnant?     If yes, how many weeks?   ____________________________    17.   N  Are you taking any prescription pain medications on a routine schedule?  [ If yes, EXTENDED PREP.] [If yes, MAC]    18.   N Do you have any chemical dependencies such as alcohol, street drugs, or methadone?  [If yes, MAC]    19.   N  Do you have any history of post-traumatic stress syndrome, severe anxiety or history of psychosis?  [If yes, MAC]  ____________________________    20.   Do you transfer independently? (If NO, please HOSPITAL setting  only)  Y      21.   Preferred LOCAL Pharmacy for Pre Prescription:                            [facility] - [place], [place] - [id number]    Scheduling Details      Caller: Gisela Pak    (Please ask for phone number if not scheduled by patient)    Type of Procedure Scheduled: Lower Endoscopy [Colonoscopy]    Which Colonoscopy Prep was Sent?: MARCELINO LÓPEZ CF PATIENTS & [facility]S PATIENTS NEEDS EXTENDED PREP    Surgeon: LEVENTHAL  Date of Procedure: 11/9  Location: [facility]    Sedation Type: CS  per screening   Conscious Sedation- Needs  for 6 hours after the procedure  MAC/General-Needs  for 24 hours after procedure    Pre-op Required at [facility] [place], [place] and OR for MAC sedation:  N  (advise patient they will need a pre-op WITH IN 30 DAYS prior to procedure -)      Informed patient they will need an adult  Y  Cannot take any type of public or medical transportation alone    Pre-Procedure Covid test to be completed at [facility] or Externally/Informed of at home test option: HOME    Confirmed Nurse  will call to complete assessment Y    Additional comments:

## 2022-09-27 ENCOUNTER — TELEPHONE (OUTPATIENT)
Dept: GASTROENTEROLOGY | Facility: CLINIC | Age: 62
End: 2022-09-27

## 2022-09-27 NOTE — TELEPHONE ENCOUNTER
Caller: Gisela    Procedure: Colon    Date, Location, and Surgeon of Procedure Cancelled: 11/9/22 [facility] [place]    Ordering Provider:Ruthann    Reason for cancel (please be detailed, any staff messages or encounters to note?): Patient        Rescheduled: Yes     If rescheduled:    Date: 12/7/22   Location: [facility]   Prep Resent: No changes (changes to prep?)   Covid Test Rescheduled: No   Note any change or update to original order/sedation: Changed to MAC sedation due to date

## 2022-10-16 ENCOUNTER — HEALTH MAINTENANCE LETTER (OUTPATIENT)
Age: 62
End: 2022-10-16

## 2022-10-20 NOTE — LETTER
[facility]    06/05/17    Patient: Gisela Pak  YOB: 1960  Medical Record Number: [id number]                                                                  Controlled Substance Agreement  I understand that my care provider has prescribed controlled substances (narcotics, tranquilizers, and/or stimulants) to help manage my condition(s).  I am taking this medicine to help me function or work.  I know that this is strong medicine.  It could have serious side effects and even cause a dependency on the drug.  If I stop these medicines suddenly, I could have severe withdrawal symptoms.    The risks, benefits, and side effects of these medication(s) were explained to me.  I agree that:  1. I will take part in other treatments as advised by my provider.  This may be psychiatry or counseling, physical therapy, behavioral therapy, group treatment, or a referral to a pain clinic.  I will reduce or stop my medicine when my provider tells me to do so.   2. I will take my medicines as prescribed.  I will not change the dose or schedule unless my provider tells me to.  There will be no refills if I  run out early.   I may be contacted at any time without warning and asked to complete a drug test or pill count.   3. I will keep all my appointments at the clinic.  If I miss appointments or fail to follow instructions, my provider may stop my medicine.  4. I will not ask other providers to prescribe controlled substances. And I will not accept controlled substances from other people. If I need another prescribed controlled substance for a new reason, I will notify my provider within one business day.  5. If I enroll in the [place] Medical Marijuana program, I will tell my provider.  I will also sign an agreement to share my medical records with my provider.  6. I will use one pharmacy to fill all of my controlled substance prescriptions.  If my prescription is mailed to my pharmacy, it may take  5 to 7 days for my medicine to be ready.  7. I understand that my provider, clinic care team, and pharmacy can track controlled substance prescriptions from other providers through a central database (prescription monitoring program).  8. I will bring in my list of medications (or my medicine bottles) each time I come to the clinic.  REV- 04/2016                                                                                                                                            Page 1 of 2      [facility]    06/05/17    Patient: Gisela Pak  YOB: 1960  Medical Record Number: [id number]    9. Refills of controlled substances will be made only during office visits with my prescriber.  It is up to me to make sure that I get my routine appointments scheduled well in advance so that I do not run out.  Refills will not be provided by phone request.    10. I am responsible for my prescriptions.  If the medicine is lost or stolen, it will not be replaced.   I also agree not to share these medicines with anyone.  11. I agree to not use ANY illegal or recreational drugs.  This includes marijuana, cocaine, bath salts or other drugs.  I agree not to use alcohol unless my provider says I may.  I agree to give urine samples whenever asked.  If I fail to give a urine sample, the provider may stop my medicine.     12. I will tell my nurse or provider right away if I become pregnant or have a new medical problem treated outside of [facility].  13. I understand that this medicine can affect my thinking and judgment.  It may be unsafe for me to drive, use machinery and do dangerous tasks.  I will not do any of these things until I know how the medicine affects me.  If my dose changes, I will wait to see how it affects me.  I will contact my provider if I have concerns about medicine side effects.  I understand that if I do not follow any of the conditions above, my prescriptions or  treatment may be stopped.    I agree that my provider, clinic care team, and pharmacy may work with any city, state or federal law enforcement agency that investigates the misuse, sale, or other diversion of my controlled medicine. I will allow my provider to discuss my care with or share a copy of this agreement with any other treating provider, pharmacy or emergency room where I receive care.  I agree to give up (waive) any right of privacy or confidentiality with respect to these authorizations.   I have read this agreement and have asked questions about anything I did not understand.   ___________________________________    ___________________________  Patient Signature                                                           Date and Time                                                           Date and Time  ___________________________________  Mel Beavers MD  REV-  04/2016                                                                                                                                                                 Page 2 of 2     No

## 2022-11-18 ASSESSMENT — ENCOUNTER SYMPTOMS
EYE PAIN: 0
HEMATOCHEZIA: 0
HEARTBURN: 0
JOINT SWELLING: 0
NERVOUS/ANXIOUS: 0
DIARRHEA: 0
WEAKNESS: 0
HEMATURIA: 0
COUGH: 1
PALPITATIONS: 0
ARTHRALGIAS: 1
DIZZINESS: 1
ABDOMINAL PAIN: 0
FREQUENCY: 0
CONSTIPATION: 0
DYSURIA: 0
CHILLS: 0
MYALGIAS: 1
SORE THROAT: 0
SHORTNESS OF BREATH: 0
HEADACHES: 0
PARESTHESIAS: 0
FEVER: 0
NAUSEA: 0

## 2022-11-19 PROBLEM — M24.20 EAGLE'S SYNDROME: Status: ACTIVE | Noted: 2022-11-19

## 2022-11-19 PROBLEM — G54.0 THORACIC OUTLET SYNDROME: Status: ACTIVE | Noted: 2022-11-19

## 2022-11-19 ASSESSMENT — ENCOUNTER SYMPTOMS
MYALGIAS: 1
NERVOUS/ANXIOUS: 0
DYSURIA: 0
HEMATURIA: 0
ARTHRALGIAS: 1
CHILLS: 0
PARESTHESIAS: 0
PALPITATIONS: 0
SHORTNESS OF BREATH: 0
HEARTBURN: 0
DIZZINESS: 1
COUGH: 1
JOINT SWELLING: 0
CONSTIPATION: 0
FEVER: 0
WEAKNESS: 0
HEMATOCHEZIA: 0
FREQUENCY: 0
ABDOMINAL PAIN: 0
DIARRHEA: 0
HEADACHES: 0
NAUSEA: 0
SORE THROAT: 0
EYE PAIN: 0
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
## 2022-11-21 ENCOUNTER — TELEPHONE (OUTPATIENT)
Dept: GASTROENTEROLOGY | Facility: CLINIC | Age: 62
End: 2022-11-21

## 2022-11-21 NOTE — TELEPHONE ENCOUNTER
CANCEL - RESCHEDULE    Ordering Provider: Ruthann  Procedure Cancelled: COLONOSCOPY  Procedure Date Cancelled: 12/07  Surgeon of Procedure Cancelled: LEVENTHAL  Sedation type: MODERATE SEDATION  Location of Procedure Cancelled: [facility]      Rescheduled: NO     If rescheduled:    Date: -   Location:    Sedation Type:    PAC / Pre-op Required     PREP TYPE: STANDARD GOLYTELY    Covid Test [ESSC - PCR IN FACILITY] HOME   Note any change or update to original order/sedation: N   Preferred LOCAL Pharmacy for Pre Prescription  [facility] - [place], [place] - [address]       Reason for cancel (please be detailed, any staff messages or encounters to note? SCHED ERROR? FACILITY/ SEDATION CHANGE? ):   PATIENT DOES NOT HAVE , NEEDS TO TALK TO  AS HIS SCHEDULE VARIES       Details and Prep sent via --    Additional details: -

## 2022-11-22 ENCOUNTER — OFFICE VISIT (OUTPATIENT)
Dept: FAMILY MEDICINE | Facility: CLINIC | Age: 62
End: 2022-11-22
Payer: COMMERCIAL

## 2022-11-22 VITALS
TEMPERATURE: 96.8 F | SYSTOLIC BLOOD PRESSURE: 136 MMHG | OXYGEN SATURATION: 99 % | DIASTOLIC BLOOD PRESSURE: 82 MMHG | BODY MASS INDEX: 29.71 KG/M2 | HEART RATE: 80 BPM | WEIGHT: 174 LBS | HEIGHT: 64 IN | RESPIRATION RATE: 18 BRPM

## 2022-11-22 DIAGNOSIS — Z12.11 SCREEN FOR COLON CANCER: ICD-10-CM

## 2022-11-22 DIAGNOSIS — Z13.220 SCREENING FOR HYPERLIPIDEMIA: ICD-10-CM

## 2022-11-22 DIAGNOSIS — Z13.1 SCREENING FOR DIABETES MELLITUS: ICD-10-CM

## 2022-11-22 DIAGNOSIS — Z00.00 ROUTINE GENERAL MEDICAL EXAMINATION AT A HEALTH CARE FACILITY: Primary | ICD-10-CM

## 2022-11-22 LAB
CHOLEST SERPL-MCNC: 237 MG/DL
FASTING STATUS PATIENT QL REPORTED: YES
GLUCOSE SERPL-MCNC: 98 MG/DL (ref 70–99)
HDLC SERPL-MCNC: 65 MG/DL
LDLC SERPL CALC-MCNC: 148 MG/DL
NONHDLC SERPL-MCNC: 172 MG/DL
TRIGL SERPL-MCNC: 120 MG/DL

## 2022-11-22 PROCEDURE — 82947 ASSAY GLUCOSE BLOOD QUANT: CPT | Performed by: FAMILY MEDICINE

## 2022-11-22 PROCEDURE — 36415 COLL VENOUS BLD VENIPUNCTURE: CPT | Performed by: FAMILY MEDICINE

## 2022-11-22 PROCEDURE — 99396 PREV VISIT EST AGE 40-64: CPT | Performed by: FAMILY MEDICINE

## 2022-11-22 PROCEDURE — 80061 LIPID PANEL: CPT | Performed by: FAMILY MEDICINE

## 2022-11-22 NOTE — PATIENT INSTRUCTIONS
If you have MyChart:  1) I kindly request that you check your MyChart prior to all appointments with me and complete any assigned questionnaires ahead of time.    2) You may receive auto-released results from our system before I have the opportunity to review and comment.  Be assured I will review and comment on all of your results as soon as I can.      If you do not have MyChart:  1) I encourage you to sign up for Mychart ([email or web address]).  This will allow you to review your results, securely communicate with your care team, and schedule virtual visits as well.  2) Please be aware that result letters from the clinic can take up to 2 weeks but urgent results will be called to you.      FYI:  1) I do virtual (video) visits exclusively on Wednesdays.  I still do in-person visits at [facility] ([phone]) on Mondays, Tuesdays and Thursdays.  You can schedule a video visit for many conditions.  Please follow this link:  [email or web address]  2) My schedule has been booking out very far in advance (2 months).  I apologize for the lack of timely access.  If you need to be seen for a chronic condition or preventive (wellness) visit, please be sure to schedule that appointment 2-3 months in advance.  If you have a concern that you feel cannot wait until my next available appointment (such as a hospital follow-up or new symptom of concern) please ask to speak to one of the [facility] nurses who may be able to access a sooner appointment.    I do ask that all patients who are taking chronic medications for conditions that I am managing schedule an in-person visit with me at least once a year.     To schedule any ordered imaging studies (including mammogram and/or DEXA scan) you can call [facility] Scheduling at [phone].      Preventive Health Recommendations  Female Ages 50 - 64    Yearly exam: See your health care provider  every year in order to  Review health changes.   Discuss preventive care.    Review your medicines if your doctor has prescribed any.    Get a Pap test every three years (unless you have an abnormal result and your provider advises testing more often).  If you get Pap tests with HPV test, you only need to test every 5 years, unless you have an abnormal result.   You do not need a Pap test if your uterus was removed (hysterectomy) and you have not had cancer.  You should be tested each year for STDs (sexually transmitted diseases) if you're at risk.   Have a mammogram every 1 to 2 years.  Have a colonoscopy at age 50, or have a yearly FIT test (stool test). These exams screen for colon cancer.    Have a cholesterol test every 5 years, or more often if advised.  Have a diabetes test (fasting glucose) every three years. If you are at risk for diabetes, you should have this test more often.   If you are at risk for osteoporosis (brittle bone disease), think about having a bone density scan (DEXA).    Shots: Get a flu shot each year. Get a tetanus shot every 10 years.    Nutrition:   Eat at least 5 servings of fruits and vegetables each day.  Eat whole-grain bread, whole-wheat pasta and brown rice instead of white grains and rice.  Get adequate Calcium and Vitamin D.     Lifestyle  Exercise at least 150 minutes a week (30 minutes a day, 5 days a week). This will help you control your weight and prevent disease.  Limit alcohol to one drink per day.  No smoking.   Wear sunscreen to prevent skin cancer.   See your dentist every six months for an exam and cleaning.  See your eye doctor every 1 to 2 years.

## 2022-11-23 NOTE — RESULT ENCOUNTER NOTE
Gray Higgins,  Your lipid panel (cholesterol) results are stable and your fasting blood sugar is normal.  Same recommendations: focus on a healthy diet and exercise.      Happy Thanksgiving!    Mel Beavers MD

## 2023-01-13 ENCOUNTER — VIRTUAL VISIT (OUTPATIENT)
Dept: NEUROLOGY | Facility: CLINIC | Age: 63
End: 2023-01-13
Payer: COMMERCIAL

## 2023-01-13 DIAGNOSIS — M24.20 EAGLE'S SYNDROME: ICD-10-CM

## 2023-01-13 DIAGNOSIS — R42 DYSEQUILIBRIUM: ICD-10-CM

## 2023-01-13 DIAGNOSIS — G54.0 TOS (THORACIC OUTLET SYNDROME): Primary | ICD-10-CM

## 2023-01-13 DIAGNOSIS — I87.1 COMPRESSION OF VEIN: ICD-10-CM

## 2023-01-13 PROCEDURE — 99215 OFFICE O/P EST HI 40 MIN: CPT | Mod: 95 | Performed by: PSYCHIATRY & NEUROLOGY

## 2023-01-13 NOTE — LETTER
1/13/2023       RE: Gisela Pak  [address]  [facility] [place] [address]     Dear Colleague,    Thank you for referring your patient, Gisela Pak, to the [facility] [place] at [facility]. Please see a copy of my visit note below.    Follow-up 6-1-22:  Gisela Pak is a 62 year old female with a past medical history of severe head pressure worse being reclined, myofascial pain, and imbalance with veering to the left. The balance issues have been present since Feb 2021. Headaches have been present since her 20's. She had a normal exam on 6-1-22 except for tenderness over the anterior scalenes and sternoclavicular junction, worse on the right side.   Head pressure that wakes her up in the morning and is better with being upright was worrisome for raised intracranial pressure. Given that she has right sided predominant thoracic outlet tenderness, we worked her up for venous compression in the neck and upper shoulder. Non-contrast brain MRI was previously normal.      Interim History 7-29-22:  Ultrasound 6-9-22:  1. RIGHT Subclavian: A. 8.79 velocity ratio from the lateral to mid subclavian vein suggests stenosis.     CTV Head and Neck 6-23-22:  In neutral position, the upper internal jugular veins demonstrate mild narrowing narrowing bilaterally. With neck flexion, there is moderate narrowing of the upper left internal jugular vein. Near complete effacement of the right internal jugular vein in flexion between the digastric and C2 transverse process.     Patient was better in April and May but symptoms have been worse in the last couple of months. Symptoms are worse with weather changes, mostly related with humidity and barometric pressures. She was just in [place] and she was almost asymptomatic. The weather was very even. Going up elevation to 3000 feet, she became nauseated but it didn't make the headache worse. The weather  changes in general can cause head pressure. She does not have a problem with flying. Symptoms can be triggered by forward head flexion.      Pathophysiology reviewed with both internal jugular veins narrowed with forward head flexion and some evidence of right sided subclavian vein narrowing. We discussed the need for ergonomic awareness and the role of physical therapy.      Plan:  1. 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 I've instructed her on how to look for a provider in her area.   2. Physical therapy for the subclavian vein compression. Therapy requested.   3. Follow-up 4 months    INTERIM HISTORY 1/13/2023:  8-31-22 One session of PT:   1.  Hypertonicity bilateral scalenes  2.  Decreased cervical spine ROM     [facility] chiropractic, through [facility], Dr. Fletcher since late October. This has been beneficial for having less shoulder tightness, does not need daily shoulder massages. She had had a 5-week break and the shoulder tightness return.    Headaches are uncommon, not affected by the NUCCA.   Her balance problems are not changed.  She finds that the weather changes make her more imbalanced. The trigger seems to be changing barometric pressure and humidity. She was perfectly fine when she was in [place].     Plan:  1. Should try PT, will refer to area close to her home  2. Care for neck protection especially with travel.   3. Follow-up 4-6 months    DATA:  I personally reviewed the following data.    Last brain imaging:  CTV Head w Contrast  Narrative: EXAM: CTV HEAD NECK W CONTRAST 6/23/2022 10:28 AM   Reconstruction by the Radiologist on 3D workstation    History:  62F with severe head pressure, imbalance, questions venous  stenosis, styloid length.; Dysequilibrium; Compression of vein; TOS  (thoracic outlet syndrome); Muscle spasm; Pressure in head    Comparison: 4/27/2021 brain MR, 6/9/2022 ultrasound    Technique: Post  intravenous contrast imaging was obtained of the head  and neck with thin sections from the vertex through the lung apices  with a delay for venogram purposes. Patient was imaged in the neutral  position(s). Images were reviewed on the 3D workstation and  manipulated.    Contrast Dose: Isovue 370 68cc    Findings:    HEAD:  Head CTV demonstrates patent major dural and deep intracranial venous  sinuses with no evidence of thrombosis.    NECK  Patent major cervical veins with no evidence of thrombosis.     The styloid process  on the RIGHT measures 2.3 cm.     The styloid process on the LEFT measures 2.3 cm.    NEUTRAL POSITION:    The RIGHT upper internal jugular vein, anterior to the C1 transverse  process, demonstrates mild narrowing.     The LEFT upper internal jugular vein, anterior to the C1 transverse  process, demonstrates mild narrowing.     The mid and lower internal jugular veins are unremarkable.    FLEXED POSITION:  The RIGHT upper internal jugular vein, anterior to the C1 transverse  process, demonstrates moderate narrowing.     The LEFT upper internal jugular vein, anterior to the C1 transverse  process, demonstrates moderate narrowing.     Near complete effacement of the cervical segment of the right internal  jugular as it intersperses between the posterior belly of the  digastric and the transverse process of C2. The remaining mid and  lower internal jugular veins are unremarkable.    The visualized lung apices appear unremarkable.   Impression: Impression:    1.  CTV of the head reveals patent major intracranial veins and dural  venous sinuses with no evidence of thrombus.  2.  CTV of the neck reveals patent major cervical veins with no  evidence of thrombus.   3. In neutral position, the upper internal jugular veins demonstrate  mild narrowing narrowing bilaterally. With neck flexion, there is  moderate narrowing of the upper left internal jugular vein.   4. Near complete effacement of the right  internal jugular vein in  flexion between the digastric and C2 transverse process. Findings are  of indeterminate clinical significance.    I have personally reviewed the examination and initial interpretation  and I agree with the findings.    THERESA SAMUEL MD         SYSTEM ID:  [id number]    42-minutes were spent in evaluation and counseling as well as documentation on the date of service. After a review of the patient s situation, this visit was changed from an in-person visit to a video visit to reduce the risk of COVID-19 exposure.        Sincerely,    Alana MARIN Cha, MD

## 2023-01-13 NOTE — Clinical Note
January 13, 2023       TO: Gisela Pak  [address]  [facility] [id number]       DearMs.Pasha,    We are writing to inform you of your test results.    {p results letter list:[id number]}    No results found from the In Basket message.    ***

## 2023-01-13 NOTE — LETTER
1/13/2023       RE: Gisela Pak  [address]  [facility] [address]     Dear Colleague,    Thank you for referring your patient, Gisela Pak, to the [facility] NEUROLOGY [facility]INIC [place] [facility]. Please see a copy of my visit note below.    Follow-up 6-1-22:  Gisela Pak is a 62 year old female with a past medical history of severe head pressure worse being reclined, myofascial pain, and imbalance with veering to the left. The balance issues have been present since Feb 2021. Headaches have been present since her 20's. She had a normal exam on 6-1-22 except for tenderness over the anterior scalenes and sternoclavicular junction, worse on the right side.   Head pressure that wakes her up in the morning and is better with being upright was worrisome for raised intracranial pressure. Given that she has right sided predominant thoracic outlet tenderness, we worked her up for venous compression in the neck and upper shoulder. Non-contrast brain MRI was previously normal.      Interim History 7-29-22:  Ultrasound 6-9-22:  1. RIGHT Subclavian: A. 8.79 velocity ratio from the lateral to mid subclavian vein suggests stenosis.     CTV Head and Neck 6-23-22:  In neutral position, the upper internal jugular veins demonstrate mild narrowing narrowing bilaterally. With neck flexion, there is moderate narrowing of the upper left internal jugular vein. Near complete effacement of the right internal jugular vein in flexion between the digastric and C2 transverse process.     Patient was better in April and May but symptoms have been worse in the last couple of months. Symptoms are worse with weather changes, mostly related with humidity and barometric pressures. She was just in [place] and she was almost asymptomatic. The weather was very even. Going up elevation to 3000 feet, she became nauseated but it didn't make the headache worse. The weather  changes in general can cause head pressure. She does not have a problem with flying. Symptoms can be triggered by forward head flexion.      Pathophysiology reviewed with both internal jugular veins narrowed with forward head flexion and some evidence of right sided subclavian vein narrowing. We discussed the need for ergonomic awareness and the role of physical therapy.      Plan:  1. 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 I've instructed her on how to look for a provider in her area.   2. Physical therapy for the subclavian vein compression. Therapy requested.   3. Follow-up 4 months    INTERIM HISTORY 1/13/2023:  8-31-22 One session of PT:   1.  Hypertonicity bilateral scalenes  2.  Decreased cervical spine ROM     [facility] chiropractic, through [facility], Dr. Fletcher since late October. This has been beneficial for having less shoulder tightness, does not need daily shoulder massages. She had had a 5-week break and the shoulder tightness return.    Headaches are uncommon, not affected by the N[facility]CCA.   Her balance problems are not changed.  She finds that the weather changes make her more imbalanced. The trigger seems to be changing barometric pressure and humidity. She was perfectly fine when she was in [place].     Plan:  1. Should try PT, will refer to area close to her home  2. Care for neck protection especially with travel.   3. Follow-up 4-6 months    DATA:  I personally reviewed the following data.    Last brain imaging:  CTV Head w Contrast  Narrative: EXAM: CTV HEAD NECK W CONTRAST 6/23/2022 10:28 AM   Reconstruction by the Radiologist on 3D workstation    History:  62F with severe head pressure, imbalance, questions venous  stenosis, styloid length.; Dysequilibrium; Compression of vein; TOS  (thoracic outlet syndrome); Muscle spasm; Pressure in head    Comparison: 4/27/2021 brain MR, 6/9/2022 ultrasound    Technique: Post  intravenous contrast imaging was obtained of the head  and neck with thin sections from the vertex through the lung apices  with a delay for venogram purposes. Patient was imaged in the neutral  position(s). Images were reviewed on the 3D workstation and  manipulated.    Contrast Dose: Isovue 370 68cc    Findings:    HEAD:  Head CTV demonstrates patent major dural and deep intracranial venous  sinuses with no evidence of thrombosis.    NECK  Patent major cervical veins with no evidence of thrombosis.     The styloid process  on the RIGHT measures 2.3 cm.     The styloid process on the LEFT measures 2.3 cm.    NEUTRAL POSITION:    The RIGHT upper internal jugular vein, anterior to the C1 transverse  process, demonstrates mild narrowing.     The LEFT upper internal jugular vein, anterior to the C1 transverse  process, demonstrates mild narrowing.     The mid and lower internal jugular veins are unremarkable.    FLEXED POSITION:  The RIGHT upper internal jugular vein, anterior to the C1 transverse  process, demonstrates moderate narrowing.     The LEFT upper internal jugular vein, anterior to the C1 transverse  process, demonstrates moderate narrowing.     Near complete effacement of the cervical segment of the right internal  jugular as it intersperses between the posterior belly of the  digastric and the transverse process of C2. The remaining mid and  lower internal jugular veins are unremarkable.    The visualized lung apices appear unremarkable.   Impression: Impression:    1.  CTV of the head reveals patent major intracranial veins and dural  venous sinuses with no evidence of thrombus.  2.  CTV of the neck reveals patent major cervical veins with no  evidence of thrombus.   3. In neutral position, the upper internal jugular veins demonstrate  mild narrowing narrowing bilaterally. With neck flexion, there is  moderate narrowing of the upper left internal jugular vein.   4. Near complete effacement of the right  internal jugular vein in  flexion between the digastric and C2 transverse process. Findings are  of indeterminate clinical significance.    I have personally reviewed the examination and initial interpretation  and I agree with the findings.    THERESA SAMUEL MD         SYSTEM ID:  [id number]    42-minutes were spent in evaluation and counseling as well as documentation on the date of service. After a review of the patient s situation, this visit was changed from an in-person visit to a video visit to reduce the risk of COVID-19 exposure.        Sincerely,    Alana MARIN Cha, MD

## 2023-01-17 ENCOUNTER — TELEPHONE (OUTPATIENT)
Dept: NEUROLOGY | Facility: CLINIC | Age: 63
End: 2023-01-17

## 2023-01-17 ENCOUNTER — TELEPHONE (OUTPATIENT)
Dept: NEUROLOGY | Facility: CLINIC | Age: 63
End: 2023-01-17
Payer: COMMERCIAL

## 2023-01-17 NOTE — TELEPHONE ENCOUNTER
Received my chart message stated pt wants to go to Orthology in [place] . Called [facility] [place] at [phone] spoke to Francoise and she stated fax # is [phone].

## 2023-01-17 NOTE — TELEPHONE ENCOUNTER
Left message informing pt I have sent her a my chart message and if she could please respond back to me via my chart or call the clinic. Clinic # given.

## 2023-01-18 ENCOUNTER — CARE COORDINATION (OUTPATIENT)
Dept: NEUROLOGY | Facility: CLINIC | Age: 63
End: 2023-01-18
Payer: COMMERCIAL

## 2023-03-27 DIAGNOSIS — R09.81 NASAL CONGESTION: ICD-10-CM

## 2023-03-28 ENCOUNTER — TELEPHONE (OUTPATIENT)
Dept: FAMILY MEDICINE | Facility: CLINIC | Age: 63
End: 2023-03-28

## 2023-03-28 ENCOUNTER — MYC MEDICAL ADVICE (OUTPATIENT)
Dept: FAMILY MEDICINE | Facility: CLINIC | Age: 63
End: 2023-03-28
Payer: COMMERCIAL

## 2023-03-28 DIAGNOSIS — U07.1 INFECTION DUE TO 2019 NOVEL CORONAVIRUS: Primary | ICD-10-CM

## 2023-03-28 NOTE — TELEPHONE ENCOUNTER
Gray Beavers:     I tested positive for Covid this morning.  I'm actually not sure of Covid protocol in relation to my clinic, but I'm letting you know so it can be added to my medical records if appropriate.  .  This is my first bought with Covid, so I lasted pretty long!!  Running a low grade fever, achy, coughing a bit and stuffy head.  That's the main symptoms at this point.    Hope all is well with you and yours.     Gisela Pak      My chart review-  11/22/22 was last OV.  GFR=78.  ALT and AST was normal.  Alk phos normal.    Left message on answering machine for patient to call clinic triage.  EBEN Reyna

## 2023-03-28 NOTE — TELEPHONE ENCOUNTER
RN COVID TREATMENT VISIT  03/28/23      The patient has been triaged and does not require a higher level of care.    Gisela Pak  63 year old  Current weight? 175    Has the patient been seen by a primary care provider at an [facility] or [facility] within the past two years? Yes.   Have you been in close proximity to/do you have a known exposure to a person with a confirmed case of influenza? No.     General treatment eligibility:  Date of positive COVID test (PCR or at home)?  3/28/23    Are you or have you been hospitalized for this COVID-19 infection? No.   Have you received monoclonal antibodies or antiviral treatment for COVID-19 since this positive test? No.   Do you have any of the following conditions that place you at risk of being very sick from COVID-19?   - Age 50 years or older  - Mental health disorders including mood disorders, depression, schizophrenia spectrum disorders   - Overweight or Obesity (BMI >85th percentile or BMI 25 or higher)  Yes, patient has at least one high risk condition as noted above.     Current COVID symptoms:   - fever or chills  - cough  - muscle or body aches  - headache  - sore throat  - congestion or runny nose  - nausea or vomiting  - diarrhea  Yes. Patient has at least one symptom as selected.     How many days since symptoms started? 5 days or less. Established patient, 12 years or older weighing at least 88.2 lbs, who has symptoms that started in the past 5 days, has not been hospitalized nor received treatment already, and is at risk for being very sick from COVID-19.     Treatment eligibility by RN:    Are you currently pregnant or nursing? No    Do you have a clinically significant hypersensitivity to nirmatrelvir or ritonavir, or toxic epidermal necrolysis (TEN) or Salinas-Chance Syndrome? No    Do you have a history of hepatitis, any hepatic impairment on the Problem List (such as Child-Escamilla Class C, cirrhosis, fatty liver  disease, alcoholic liver disease), or was the last liver lab (hepatic panel, ALT, AST, ALK Phos, bilirubin) elevated in the past 6 months? No    Do you have any history of severe renal impairment (eGFR < 30mL/min)? No    Is patient eligible to continue?   Yes, patient meets all eligibility requirements for the RN COVID treatment (as denoted by all no responses above).     Current Outpatient Medications   Medication Sig Dispense Refill     clonazePAM (KLONOPIN) 0.5 MG tablet Take 0.5-1 tablets (0.25-0.5 mg) by mouth nightly as needed for anxiety 30 tablet 0     fluticasone (FLONASE) 50 MCG/ACT nasal spray Spray 1 spray into both nostrils daily 16 g 11     magnesium 250 MG tablet Take 1 tablet by mouth daily (Patient not taking: Reported on 6/1/2022)       potassium aminobenzoate 500 MG CAPS capsule  (Patient not taking: Reported on 6/1/2022)       tretinoin (RETIN-A) 0.05 % external cream APPLY PEA SIZE AMOUNT TO FACE AT BEDTIME       Vitamin D3 (CHOLECALCIFEROL) 25 mcg (1000 units) tablet Take by mouth daily (Patient not taking: Reported on 6/1/2022)         Medications from List 1 of the standing order (on medications that exclude the use of Paxlovid) that patient is taking: NONE. Is patient taking Juan's Wort? No  Is patient taking Juan's Wort or any meds from List 1? No.   Medications from List 2 of the standing order (on meds that provider needs to adjust) that patient is taking: NONE. Is patient on any of the meds from List 2? No.   Medications from List 3 of standing order (on meds that a RN needs to adjust) that patient is taking: clonazepam (Klonopin): Is patient taking as needed and less than daily? Yes, instructed patient to stop taking clonazepam while taking Paxlovid and restart clonazepam 3 days after the completion of Paxlovid. Is patient on any meds from List 3? Yes. Patient is on meds from list 3. No meds require a provider visit and at least one med required RN to adjust.     Paxlovid has an  approximate 90% reduction in hospitalization. Paxlovid can possibly cause altered sense of taste, diarrhea (loose, watery stools), high blood pressure, muscle aches.     Would patient like a Paxlovid prescription?   Yes.   Lab Results   Component Value Date    GFRESTIMATED 78 03/15/2018       Was last eGFR reduced? No, eGFR 60 or greater/ No Result on record. Patient can receive the normal renal function dose. Paxlovid Rx sent to [place] [facility]   [facility]    Temporary change to home medications: stop clonazepam     All medication adjustments (holds, etc) were discussed with the patient and patient was asked to repeat back (teachback) their med adjustment.  Did patient understand med adjustment? Yes, patient repeated back and understood correctly.        Reviewed the following instructions with the patient:    Paxlovid (nimatrelvir and ritonavir)    How it works  Two medicines (nirmatrelvir and ritonavir) are taken together. They stop the virus from growing. Less amount of virus is easier for your body to fight.    How to take    Medicine comes in a daily container with both medicine tablets. Take by mouth twice daily (once in the morning, once at night) for 5 days.    The number of tablets to take varies by patient.    Don't chew or break capsules. Swallow whole.    When to take  Take as soon as possible after positive COVID-19 test result, and within 5 days of your first symptoms.    Possible side effects  Can cause altered sense of taste, diarrhea (loose, watery stools), high blood pressure, muscle aches.    Zuri Mooney RN

## 2023-03-29 RX ORDER — FLUTICASONE PROPIONATE 50 MCG
SPRAY, SUSPENSION (ML) NASAL
Qty: 16 G | Refills: 4 | Status: SHIPPED | OUTPATIENT
Start: 2023-03-29

## 2023-03-29 NOTE — TELEPHONE ENCOUNTER
Patient had office visit with Dr. Beavers on 11/22/22.    Prescription approved per [facility] Refill Protocol.    KEVIN VillafanaN, RN-[facility]

## 2023-06-16 ENCOUNTER — VIRTUAL VISIT (OUTPATIENT)
Dept: NEUROLOGY | Facility: CLINIC | Age: 63
End: 2023-06-16
Payer: COMMERCIAL

## 2023-06-16 DIAGNOSIS — M24.20 EAGLE'S SYNDROME: ICD-10-CM

## 2023-06-16 DIAGNOSIS — R51.9 PRESSURE IN HEAD: ICD-10-CM

## 2023-06-16 DIAGNOSIS — R42 DYSEQUILIBRIUM: Primary | ICD-10-CM

## 2023-06-16 DIAGNOSIS — I87.1 COMPRESSION OF VEIN: ICD-10-CM

## 2023-06-16 DIAGNOSIS — M62.838 MUSCLE SPASM: ICD-10-CM

## 2023-06-16 DIAGNOSIS — G54.0 TOS (THORACIC OUTLET SYNDROME): ICD-10-CM

## 2023-06-16 PROCEDURE — 99215 OFFICE O/P EST HI 40 MIN: CPT | Mod: VID | Performed by: PSYCHIATRY & NEUROLOGY

## 2023-06-16 NOTE — PROGRESS NOTES
"Virtual Visit Details    Type of service:  Video Visit     Originating Location (pt. Location): {video visit patient location:114957::\"Home\"}  {PROVIDER LOCATION On-site should be selected for visits conducted from your clinic location or adjoining [facility] hospital, academic office, or other nearby [facility] building. Off-site should be selected for all other provider locations, including home:145070}  Distant Location (provider location):  {virtual location provider:137427}  Platform used for Video Visit: {Virtual Visit Platforms:567208::\"SOASTA\"}        "

## 2023-06-16 NOTE — PROGRESS NOTES
The patient is being evaluated via a billable video visit.    How would you like to obtain your AVS? MyChart  If the video visit is dropped, the invitation should be resent by: Send to e-mail at: [email or web address]  Will anyone else be joining your video visit? No      Video-Visit Details  Type of service:  Video Visit  Video Start Time:3:14 PM  Video End Time:3:53 PM  Originating Location (pt. Location): Home  Distant Location (provider location):  [facility] NEUROLOGY [facility]   Platform used for Video Visit: [facility]    Follow-up 6-1-22, 7-29-22, 1-13-23:  Gisela Pak is a 63 year old female with a past medical history of severe head pressure worse being reclined, myofascial pain, and imbalance with veering to the left. The balance issues have been present since Feb 2021. Headaches have been present since her 20's. She had a normal exam on 6-1-22 except for tenderness over the anterior scalenes and sternoclavicular junction, worse on the right side.     Head pressure that wakes her up in the morning and is better with being upright was worrisome for raised intracranial pressure. Given that she has right sided predominant thoracic outlet tenderness, we worked her up for venous compression in the neck and upper shoulder. Non-contrast brain MRI was previously normal.      Interim History 7-29-22:  Ultrasound 6-9-22:  1. RIGHT Subclavian: A. 8.79 velocity ratio from the lateral to mid subclavian vein suggests stenosis.     CTV Head and Neck 6-23-22:  In neutral position, the upper internal jugular veins demonstrate mild narrowing narrowing bilaterally. With neck flexion, there is moderate narrowing of the upper left internal jugular vein. Near complete effacement of the right internal jugular vein in flexion between the digastric and C2 transverse process.     Patient was better in April and May but symptoms have been worse in the last couple of months. Symptoms are worse with weather changes,  mostly related with humidity and barometric pressures. She was just in [place] and she was almost asymptomatic. The weather was very even. Going up elevation to 3000 feet, she became nauseated but it didn't make the headache worse. The weather changes in general can cause head pressure. She does not have a problem with flying. Symptoms can be triggered by forward head flexion.      Pathophysiology reviewed with both internal jugular veins narrowed with forward head flexion and some evidence of right sided subclavian vein narrowing. We discussed the need for ergonomic awareness and the role of physical therapy.      Plan:  1. 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 I've instructed her on how to look for a provider in her area.   2. Physical therapy for the subclavian vein compression. Therapy requested.   3. Follow-up 4 months     INTERIM HISTORY 1/13/2023:  8-31-22 One session of PT:   1.  Hypertonicity bilateral scalenes  2.  Decreased cervical spine ROM     [facility] chiropractic, through [facility], Dr. Flethcer since late October. This has been beneficial for having less shoulder tightness, does not need daily shoulder massages. She had had a 5-week break and the shoulder tightness return.     Headaches are uncommon, not affected by the [facility]CCA.   Her balance problems are not changed.  She finds that the weather changes make her more imbalanced. The trigger seems to be changing barometric pressure and humidity. She was perfectly fine when she was in [place].      Plan:  1. Should try PT, will refer to area close to her home  2. Care for neck protection especially with travel.   3. Follow-up 4-6 months    Interim History 6/16/2023:  She has stopped N[facility]CCA after 6-months dues to inefficacy  She saw a PT 3x and was given some balance exercises, incorporated into her everyday routine. Balance is now better but not cured. She does balance exercises,  "like balancing on one foot with movement. She still has difficulty with closing her eyes while doing this.     She still had head pressure with the exercises, still worse with bending head forward. She will have a wave of feeling motion, like a \"whoosh.\" She can be bothered by this feeling of motion all day, worse at the end of the day. She feels wobbly and unsteady with walking, not noticeable to others.     She does have a benign tremor in her right hand some in the left. She also has trouble falling asleep. When she is driving a car, holding onto her a steering wheel makes her feel better. She can ride her bike and feel better.    After discussing about half a dozen possibilities to treat residual symptoms while potentially having some benefit for her other problems, we landed on three choices in three medication classes:    1. Topiramate 25mg: May be a good for tremor and insomnia (taken at night to start then twice a day)  2. Nortriptyline 10mg: May be a good choice for anxiety and insomnia (taken at night)  3. Propranolol 60mg ER: May be a good choice for anxiety and tremor (taken at night)    Plan:   1. Continue with daily stretching and balance exercises  2. Patient to think about pros and cons of the medication choices and let me know what she decides  3. Follow-up 6-months      Current Outpatient Medications:      clonazePAM (KLONOPIN) 0.5 MG tablet, Take 0.5-1 tablets (0.25-0.5 mg) by mouth nightly as needed for anxiety, Disp: 30 tablet, Rfl: 0     fluticasone (FLONASE) 50 MCG/ACT nasal spray, APPLY ONE SPRAY IN EACH NOSTRIL ONCE DAILY, Disp: 16 g, Rfl: 4     tretinoin (RETIN-A) 0.05 % external cream, APPLY PEA SIZE AMOUNT TO FACE AT BEDTIME, Disp: , Rfl:      magnesium 250 MG tablet, Take 1 tablet by mouth daily (Patient not taking: Reported on 6/1/2022), Disp: , Rfl:      potassium aminobenzoate 500 MG CAPS capsule, , Disp: , Rfl:      Vitamin D3 (CHOLECALCIFEROL) 25 mcg (1000 units) tablet, Take by " mouth daily (Patient not taking: Reported on 6/1/2022), Disp: , Rfl:     DATA:  I personally reviewed the following data.    Last brain imaging:  CTV Head w Contrast  Narrative: EXAM: CTV HEAD NECK W CONTRAST 6/23/2022 10:28 AM   Reconstruction by the Radiologist on 3D workstation    History:  62F with severe head pressure, imbalance, questions venous  stenosis, styloid length.; Dysequilibrium; Compression of vein; TOS  (thoracic outlet syndrome); Muscle spasm; Pressure in head    Comparison: 4/27/2021 brain MR, 6/9/2022 ultrasound    Technique: Post intravenous contrast imaging was obtained of the head  and neck with thin sections from the vertex through the lung apices  with a delay for venogram purposes. Patient was imaged in the neutral  position(s). Images were reviewed on the 3D workstation and  manipulated.    Contrast Dose: Isovue 370 68cc    Findings:    HEAD:  Head CTV demonstrates patent major dural and deep intracranial venous  sinuses with no evidence of thrombosis.    NECK  Patent major cervical veins with no evidence of thrombosis.     The styloid process  on the RIGHT measures 2.3 cm.     The styloid process on the LEFT measures 2.3 cm.    NEUTRAL POSITION:    The RIGHT upper internal jugular vein, anterior to the C1 transverse  process, demonstrates mild narrowing.     The LEFT upper internal jugular vein, anterior to the C1 transverse  process, demonstrates mild narrowing.     The mid and lower internal jugular veins are unremarkable.    FLEXED POSITION:  The RIGHT upper internal jugular vein, anterior to the C1 transverse  process, demonstrates moderate narrowing.     The LEFT upper internal jugular vein, anterior to the C1 transverse  process, demonstrates moderate narrowing.     Near complete effacement of the cervical segment of the right internal  jugular as it intersperses between the posterior belly of the  digastric and the transverse process of C2. The remaining mid and  lower internal  jugular veins are unremarkable.    The visualized lung apices appear unremarkable.   Impression: Impression:    1.  CTV of the head reveals patent major intracranial veins and dural  venous sinuses with no evidence of thrombus.  2.  CTV of the neck reveals patent major cervical veins with no  evidence of thrombus.   3. In neutral position, the upper internal jugular veins demonstrate  mild narrowing narrowing bilaterally. With neck flexion, there is  moderate narrowing of the upper left internal jugular vein.   4. Near complete effacement of the right internal jugular vein in  flexion between the digastric and C2 transverse process. Findings are  of indeterminate clinical significance.    I have personally reviewed the examination and initial interpretation  and I agree with the findings.    THERESA SAMUEL MD         SYSTEM ID:  [id number]    47-minutes were spent in evaluation, counseling, and documentation on the date of service.

## 2023-06-16 NOTE — LETTER
6/16/2023       RE: Gisela Pak  [address]  [facility] [address]     Dear Colleague,    Thank you for referring your patient, Gisela Pak, to the [facility] NEUROLOGY CLINIC [place] at [facility]. Please see a copy of my visit note below.    The patient is being evaluated via a billable video visit.    How would you like to obtain your AVS? MyChart  If the video visit is dropped, the invitation should be resent by: Send to e-mail at: [email or web address]  Will anyone else be joining your video visit? No      Video-Visit Details  Type of service:  Video Visit  Video Start Time:3:14 PM  Video End Time:3:53 PM  Originating Location (pt. Location): Home  Distant Location (provider location):  [facility] NEUROLOGY [facility]   Platform used for Video Visit: [facility]    Follow-up 6-1-22, 7-29-22, 1-13-23:  Gisela Pak is a 63 year old female with a past medical history of severe head pressure worse being reclined, myofascial pain, and imbalance with veering to the left. The balance issues have been present since Feb 2021. Headaches have been present since her 20's. She had a normal exam on 6-1-22 except for tenderness over the anterior scalenes and sternoclavicular junction, worse on the right side.     Head pressure that wakes her up in the morning and is better with being upright was worrisome for raised intracranial pressure. Given that she has right sided predominant thoracic outlet tenderness, we worked her up for venous compression in the neck and upper shoulder. Non-contrast brain MRI was previously normal.      Interim History 7-29-22:  Ultrasound 6-9-22:  1. RIGHT Subclavian: A. 8.79 velocity ratio from the lateral to mid subclavian vein suggests stenosis.     CTV Head and Neck 6-23-22:  In neutral position, the upper internal jugular veins demonstrate mild narrowing narrowing bilaterally. With neck flexion, there  is moderate narrowing of the upper left internal jugular vein. Near complete effacement of the right internal jugular vein in flexion between the digastric and C2 transverse process.     Patient was better in April and May but symptoms have been worse in the last couple of months. Symptoms are worse with weather changes, mostly related with humidity and barometric pressures. She was just in [place] and she was almost asymptomatic. The weather was very even. Going up elevation to 3000 feet, she became nauseated but it didn't make the headache worse. The weather changes in general can cause head pressure. She does not have a problem with flying. Symptoms can be triggered by forward head flexion.      Pathophysiology reviewed with both internal jugular veins narrowed with forward head flexion and some evidence of right sided subclavian vein narrowing. We discussed the need for ergonomic awareness and the role of physical therapy.      Plan:  1. 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 I've instructed her on how to look for a provider in her area.   2. Physical therapy for the subclavian vein compression. Therapy requested.   3. Follow-up 4 months     INTERIM HISTORY 1/13/2023:  8-31-22 One session of PT:   1.  Hypertonicity bilateral scalenes  2.  Decreased cervical spine ROM     [facility] chiropractic, through [facility], Dr. Fletcher since late October. This has been beneficial for having less shoulder tightness, does not need daily shoulder massages. She had had a 5-week break and the shoulder tightness return.     Headaches are uncommon, not affected by the [facility].   Her balance problems are not changed.  She finds that the weather changes make her more imbalanced. The trigger seems to be changing barometric pressure and humidity. She was perfectly fine when she was in [place].      Plan:  1. Should try PT, will refer to area close to her home  2.  "Care for neck protection especially with travel.   3. Follow-up 4-6 months    Interim History 6/16/2023:  She has stopped [facility] after 6-months dues to inefficacy  She saw a PT 3x and was given some balance exercises, incorporated into her everyday routine. Balance is now better but not cured. She does balance exercises, like balancing on one foot with movement. She still has difficulty with closing her eyes while doing this.     She still had head pressure with the exercises, still worse with bending head forward. She will have a wave of feeling motion, like a \"whoosh.\" She can be bothered by this feeling of motion all day, worse at the end of the day. She feels wobbly and unsteady with walking, not noticeable to others.     She does have a benign tremor in her right hand some in the left. She also has trouble falling asleep. When she is driving a car, holding onto her a steering wheel makes her feel better. She can ride her bike and feel better.    After discussing about half a dozen possibilities to treat residual symptoms while potentially having some benefit for her other problems, we landed on three choices in three medication classes:    1. Topiramate 25mg: May be a good for tremor and insomnia (taken at night to start then twice a day)  2. Nortriptyline 10mg: May be a good choice for anxiety and insomnia (taken at night)  3. Propranolol 60mg ER: May be a good choice for anxiety and tremor (taken at night)    Plan:   1. Continue with daily stretching and balance exercises  2. Patient to think about pros and cons of the medication choices and let me know what she decides  3. Follow-up 6-months      Current Outpatient Medications:     clonazePAM (KLONOPIN) 0.5 MG tablet, Take 0.5-1 tablets (0.25-0.5 mg) by mouth nightly as needed for anxiety, Disp: 30 tablet, Rfl: 0    fluticasone (FLONASE) 50 MCG/ACT nasal spray, APPLY ONE SPRAY IN EACH NOSTRIL ONCE DAILY, Disp: 16 g, Rfl: 4    tretinoin (RETIN-A) 0.05 % " external cream, APPLY PEA SIZE AMOUNT TO FACE AT BEDTIME, Disp: , Rfl:     magnesium 250 MG tablet, Take 1 tablet by mouth daily (Patient not taking: Reported on 6/1/2022), Disp: , Rfl:     potassium aminobenzoate 500 MG CAPS capsule, , Disp: , Rfl:     Vitamin D3 (CHOLECALCIFEROL) 25 mcg (1000 units) tablet, Take by mouth daily (Patient not taking: Reported on 6/1/2022), Disp: , Rfl:     DATA:  I personally reviewed the following data.    Last brain imaging:  CTV Head w Contrast  Narrative: EXAM: CTV HEAD NECK W CONTRAST 6/23/2022 10:28 AM   Reconstruction by the Radiologist on 3D workstation    History:  62F with severe head pressure, imbalance, questions venous  stenosis, styloid length.; Dysequilibrium; Compression of vein; TOS  (thoracic outlet syndrome); Muscle spasm; Pressure in head    Comparison: 4/27/2021 brain MR, 6/9/2022 ultrasound    Technique: Post intravenous contrast imaging was obtained of the head  and neck with thin sections from the vertex through the lung apices  with a delay for venogram purposes. Patient was imaged in the neutral  position(s). Images were reviewed on the 3D workstation and  manipulated.    Contrast Dose: Isovue 370 68cc    Findings:    HEAD:  Head CTV demonstrates patent major dural and deep intracranial venous  sinuses with no evidence of thrombosis.    NECK  Patent major cervical veins with no evidence of thrombosis.     The styloid process  on the RIGHT measures 2.3 cm.     The styloid process on the LEFT measures 2.3 cm.    NEUTRAL POSITION:    The RIGHT upper internal jugular vein, anterior to the C1 transverse  process, demonstrates mild narrowing.     The LEFT upper internal jugular vein, anterior to the C1 transverse  process, demonstrates mild narrowing.     The mid and lower internal jugular veins are unremarkable.    FLEXED POSITION:  The RIGHT upper internal jugular vein, anterior to the C1 transverse  process, demonstrates moderate narrowing.     The LEFT upper  internal jugular vein, anterior to the C1 transverse  process, demonstrates moderate narrowing.     Near complete effacement of the cervical segment of the right internal  jugular as it intersperses between the posterior belly of the  digastric and the transverse process of C2. The remaining mid and  lower internal jugular veins are unremarkable.    The visualized lung apices appear unremarkable.   Impression: Impression:    1.  CTV of the head reveals patent major intracranial veins and dural  venous sinuses with no evidence of thrombus.  2.  CTV of the neck reveals patent major cervical veins with no  evidence of thrombus.   3. In neutral position, the upper internal jugular veins demonstrate  mild narrowing narrowing bilaterally. With neck flexion, there is  moderate narrowing of the upper left internal jugular vein.   4. Near complete effacement of the right internal jugular vein in  flexion between the digastric and C2 transverse process. Findings are  of indeterminate clinical significance.    I have personally reviewed the examination and initial interpretation  and I agree with the findings.    THERESA SAMUEL MD         SYSTEM ID:  [id number]    47-minutes were spent in evaluation, counseling, and documentation on the date of service.        Sincerely,    Alana MARIN Cha, MD

## 2023-06-16 NOTE — NURSING NOTE
Is the patient currently in the state of [place]? YES    Visit mode:VIDEO    If the visit is dropped, the patient can be reconnected by: VIDEO VISIT: Text to cell phone: [phone]    Will anyone else be joining the visit? NO      How would you like to obtain your AVS? MyChart    Are changes needed to the allergy or medication list? NO    Reason for visit: RECHECK and Video Visit

## 2023-06-23 ENCOUNTER — TELEPHONE (OUTPATIENT)
Dept: NEUROLOGY | Facility: CLINIC | Age: 63
End: 2023-06-23
Payer: COMMERCIAL

## 2023-06-23 NOTE — TELEPHONE ENCOUNTER
Left Voicemail (1st Attempt) and Sent Myctatet (1st Attempt) for the patient to call back and schedule the following:    Appointment type: follow up  Provider:   Return date: 6 months -December  Specialty phone number: [phone]  Additional appointment(s) needed: N/A  Additonal Notes:   LVM and sent Irving Buckley on 6/23/2023 at 2:42 PM

## 2023-09-13 ENCOUNTER — ALLIED HEALTH/NURSE VISIT (OUTPATIENT)
Dept: FAMILY MEDICINE | Facility: CLINIC | Age: 63
End: 2023-09-13
Payer: COMMERCIAL

## 2023-09-13 VITALS — OXYGEN SATURATION: 99 % | HEART RATE: 76 BPM | DIASTOLIC BLOOD PRESSURE: 80 MMHG | SYSTOLIC BLOOD PRESSURE: 118 MMHG

## 2023-09-13 DIAGNOSIS — Z01.30 BP CHECK: Primary | ICD-10-CM

## 2023-09-13 PROCEDURE — 99207 PR NO CHARGE NURSE ONLY: CPT

## 2023-09-13 NOTE — PROGRESS NOTES
Gisela Pak is a 63 year old year old patient who comes in today for a Blood Pressure check because of ongoing blood pressure monitoring prior to starting recommended neuro meds.    Vital Signs as repeated by RN - /88 and 118/80, HR 76    Patient is taking medication as prescribed  Patient is tolerating medications well.  Patient is not monitoring Blood Pressure at home.    Current complaints: none  Disposition:  patient to continue with the same medication, patient reminded to call as needed, and follow up with neuro. Seeing Dr. Beavers for annual visit in November.    KEVIN ZavalaN, RN  [facility]

## 2023-09-13 NOTE — Clinical Note
FYI BP check in-clinic WNL - 130/88 and 118/80 upon recheck after sitting quietly for a few moments.

## 2023-11-07 DIAGNOSIS — G43.109 MIGRAINE WITH AURA AND WITHOUT STATUS MIGRAINOSUS, NOT INTRACTABLE: ICD-10-CM

## 2023-11-07 DIAGNOSIS — R25.1 TREMOR: Primary | ICD-10-CM

## 2023-11-07 RX ORDER — PROPRANOLOL HCL 60 MG
60 CAPSULE, EXTENDED RELEASE 24HR ORAL AT BEDTIME
Qty: 30 CAPSULE | Refills: 4 | Status: SHIPPED | OUTPATIENT
Start: 2023-11-07 | End: 2023-11-10

## 2023-11-10 DIAGNOSIS — R25.1 TREMOR: ICD-10-CM

## 2023-11-10 DIAGNOSIS — G43.109 MIGRAINE WITH AURA AND WITHOUT STATUS MIGRAINOSUS, NOT INTRACTABLE: ICD-10-CM

## 2023-11-10 RX ORDER — PROPRANOLOL HCL 60 MG
60 CAPSULE, EXTENDED RELEASE 24HR ORAL AT BEDTIME
Qty: 30 CAPSULE | Refills: 4 | Status: SHIPPED | OUTPATIENT
Start: 2023-11-10 | End: 2024-02-14

## 2023-11-27 ASSESSMENT — ENCOUNTER SYMPTOMS
DYSURIA: 0
MYALGIAS: 0
CHILLS: 0
FREQUENCY: 0
HEARTBURN: 1
COUGH: 1
DIARRHEA: 0
PARESTHESIAS: 0
NERVOUS/ANXIOUS: 0
NAUSEA: 0
CONSTIPATION: 0
HEMATOCHEZIA: 0
ARTHRALGIAS: 1
FEVER: 0
EYE PAIN: 0
HEADACHES: 1
DIZZINESS: 1
ABDOMINAL PAIN: 0
BREAST MASS: 0
SHORTNESS OF BREATH: 0
WEAKNESS: 0
PALPITATIONS: 0
HEMATURIA: 0
SORE THROAT: 0
JOINT SWELLING: 0

## 2023-11-28 ENCOUNTER — OFFICE VISIT (OUTPATIENT)
Dept: FAMILY MEDICINE | Facility: CLINIC | Age: 63
End: 2023-11-28
Payer: COMMERCIAL

## 2023-11-28 VITALS
RESPIRATION RATE: 15 BRPM | HEIGHT: 64 IN | WEIGHT: 180 LBS | SYSTOLIC BLOOD PRESSURE: 124 MMHG | OXYGEN SATURATION: 99 % | TEMPERATURE: 98.8 F | BODY MASS INDEX: 30.73 KG/M2 | DIASTOLIC BLOOD PRESSURE: 76 MMHG | HEART RATE: 71 BPM

## 2023-11-28 DIAGNOSIS — Z13.220 LIPID SCREENING: ICD-10-CM

## 2023-11-28 DIAGNOSIS — F41.1 GENERALIZED ANXIETY DISORDER: ICD-10-CM

## 2023-11-28 DIAGNOSIS — Z78.0 ASYMPTOMATIC POSTMENOPAUSAL STATUS: ICD-10-CM

## 2023-11-28 DIAGNOSIS — Z12.11 SCREEN FOR COLON CANCER: ICD-10-CM

## 2023-11-28 DIAGNOSIS — Z00.00 ROUTINE GENERAL MEDICAL EXAMINATION AT A HEALTH CARE FACILITY: Primary | ICD-10-CM

## 2023-11-28 DIAGNOSIS — Z13.1 SCREENING FOR DIABETES MELLITUS: ICD-10-CM

## 2023-11-28 DIAGNOSIS — Z12.31 ENCOUNTER FOR SCREENING MAMMOGRAM FOR BREAST CANCER: ICD-10-CM

## 2023-11-28 DIAGNOSIS — M72.2 PLANTAR FASCIITIS: ICD-10-CM

## 2023-11-28 LAB
ANION GAP SERPL CALCULATED.3IONS-SCNC: 10 MMOL/L (ref 7–15)
BUN SERPL-MCNC: 14.4 MG/DL (ref 8–23)
CALCIUM SERPL-MCNC: 9.3 MG/DL (ref 8.8–10.2)
CHLORIDE SERPL-SCNC: 103 MMOL/L (ref 98–107)
CHOLEST SERPL-MCNC: 264 MG/DL
CREAT SERPL-MCNC: 0.79 MG/DL (ref 0.51–0.95)
DEPRECATED HCO3 PLAS-SCNC: 26 MMOL/L (ref 22–29)
EGFRCR SERPLBLD CKD-EPI 2021: 84 ML/MIN/1.73M2
GLUCOSE SERPL-MCNC: 98 MG/DL (ref 70–99)
HBA1C MFR BLD: 5.3 % (ref 0–5.6)
HDLC SERPL-MCNC: 80 MG/DL
LDLC SERPL CALC-MCNC: 165 MG/DL
NONHDLC SERPL-MCNC: 184 MG/DL
POTASSIUM SERPL-SCNC: 4.3 MMOL/L (ref 3.4–5.3)
SODIUM SERPL-SCNC: 139 MMOL/L (ref 135–145)
TRIGL SERPL-MCNC: 96 MG/DL

## 2023-11-28 PROCEDURE — 83036 HEMOGLOBIN GLYCOSYLATED A1C: CPT | Performed by: FAMILY MEDICINE

## 2023-11-28 PROCEDURE — 99213 OFFICE O/P EST LOW 20 MIN: CPT | Mod: 25 | Performed by: FAMILY MEDICINE

## 2023-11-28 PROCEDURE — 36415 COLL VENOUS BLD VENIPUNCTURE: CPT | Performed by: FAMILY MEDICINE

## 2023-11-28 PROCEDURE — 80048 BASIC METABOLIC PNL TOTAL CA: CPT | Performed by: FAMILY MEDICINE

## 2023-11-28 PROCEDURE — 99396 PREV VISIT EST AGE 40-64: CPT | Performed by: FAMILY MEDICINE

## 2023-11-28 PROCEDURE — 80061 LIPID PANEL: CPT | Performed by: FAMILY MEDICINE

## 2023-11-28 RX ORDER — CLONAZEPAM 0.5 MG/1
0.25-0.5 TABLET ORAL
Qty: 30 TABLET | Refills: 0 | Status: SHIPPED | OUTPATIENT
Start: 2023-11-28

## 2023-11-28 ASSESSMENT — ENCOUNTER SYMPTOMS
NAUSEA: 0
SHORTNESS OF BREATH: 0
PALPITATIONS: 0
MYALGIAS: 0
WEAKNESS: 0
SORE THROAT: 0
ABDOMINAL PAIN: 0
HEADACHES: 1
NERVOUS/ANXIOUS: 0
JOINT SWELLING: 0
CHILLS: 0
HEMATOCHEZIA: 0
CONSTIPATION: 0
COUGH: 1
PARESTHESIAS: 0
ARTHRALGIAS: 1
DIARRHEA: 0
EYE PAIN: 0
DIZZINESS: 1
HEMATURIA: 0
FREQUENCY: 0
FEVER: 0
DYSURIA: 0
HEARTBURN: 1
BREAST MASS: 0

## 2023-11-28 ASSESSMENT — ANXIETY QUESTIONNAIRES
IF YOU CHECKED OFF ANY PROBLEMS ON THIS QUESTIONNAIRE, HOW DIFFICULT HAVE THESE PROBLEMS MADE IT FOR YOU TO DO YOUR WORK, TAKE CARE OF THINGS AT HOME, OR GET ALONG WITH OTHER PEOPLE: NOT DIFFICULT AT ALL
2. NOT BEING ABLE TO STOP OR CONTROL WORRYING: NOT AT ALL
3. WORRYING TOO MUCH ABOUT DIFFERENT THINGS: NOT AT ALL
GAD7 TOTAL SCORE: 0
6. BECOMING EASILY ANNOYED OR IRRITABLE: NOT AT ALL
1. FEELING NERVOUS, ANXIOUS, OR ON EDGE: NOT AT ALL
GAD7 TOTAL SCORE: 0
4. TROUBLE RELAXING: NOT AT ALL
7. FEELING AFRAID AS IF SOMETHING AWFUL MIGHT HAPPEN: NOT AT ALL
5. BEING SO RESTLESS THAT IT IS HARD TO SIT STILL: NOT AT ALL

## 2023-11-28 ASSESSMENT — PAIN SCALES - GENERAL: PAINLEVEL: NO PAIN (0)
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
## 2023-11-29 NOTE — RESULT ENCOUNTER NOTE
"Gray Higgins,  Your Hemoglobin A1C (a test assessing overall blood sugar control for the last 3 months) is fabulous.  Your fasting blood sugar is also normal so no worries about diabetes!    The rest of your basic metabolic panel including kidney function (creatinine and eGFR) is normal.      Your lipid panel (cholesterol) results, however, are higher than they've been in a while.  Fortunately your HDL (good) cholesterol is also high.  You don't need medication for this but do work on diet and exercise.      Ideally you should get 150 minutes of aerobic exercise per week (which is hard to do when your feet hurt).      A heart-healthy, Mediterranean Diet is low in saturated fat and includes the following:  -Fruits and vegetables (fresh or frozen - especially berries and cruciferous vegetables)  -Whole grains and legumes (whole grain bread, whole grain pasta, whole grain cereal, and brown rice)  -Healthy fats:  Olive oil and Canola oil  -Nuts (especially walnuts and almonds)  -Fish and seafood  -Avocado  -Soy  -Garlic  -Dark chocolate    Foods to limit or avoid include:  -Animal products and animal fats  -Saturated fat (especially fried foods and trans fats)  -Refined carbohydrates (white flour, white bread, white pasta, sugar, and white rice)  -Red meat  -Processed meat  -Processed food including most \"fast food\"     Mel Beavers MD "

## 2023-12-05 NOTE — PROGRESS NOTES
The patient is being evaluated via a billable video visit.    How would you like to obtain your AVS? MyChart  If the video visit is dropped, the invitation should be resent by: Send to e-mail at: [email or web address]  Will anyone else be joining your video visit? No      Video-Visit Details  Type of service:  Video Visit  Video Start Time:3:14 PM  Video End Time:3:36 PM  Originating Location (pt. Location): Home  Distant Location (provider location):  [facility] NEUROLOGY [facility]   Platform used for Video Visit: [facility]    Follow-up 6-1-22, 7-29-22, 1-13-23:  Gisela Pak is a 63 year old female with a past medical history of severe head pressure worse being reclined, myofascial pain, and imbalance with veering to the left. The balance issues have been present since Feb 2021. Headaches have been present since her 20's. She had a normal exam on 6-1-22 except for tenderness over the anterior scalenes and sternoclavicular junction, worse on the right side.      Head pressure that wakes her up in the morning and is better with being upright was worrisome for raised intracranial pressure. Given that she has right sided predominant thoracic outlet tenderness, we worked her up for venous compression in the neck and upper shoulder. Non-contrast brain MRI was previously normal.      Interim History 7-29-22:  Ultrasound 6-9-22:  1. RIGHT Subclavian: A. 8.79 velocity ratio from the lateral to mid subclavian vein suggests stenosis.     CTV Head and Neck 6-23-22:  In neutral position, the upper internal jugular veins demonstrate mild narrowing narrowing bilaterally. With neck flexion, there is moderate narrowing of the upper left internal jugular vein. Near complete effacement of the right internal jugular vein in flexion between the digastric and C2 transverse process.     Patient was better in April and May but symptoms have been worse in the last couple of months. Symptoms are worse with weather  changes, mostly related with humidity and barometric pressures. She was just in [place] and she was almost asymptomatic. The weather was very even. Going up elevation to 3000 feet, she became nauseated but it didn't make the headache worse. The weather changes in general can cause head pressure. She does not have a problem with flying. Symptoms can be triggered by forward head flexion.      Pathophysiology reviewed with both internal jugular veins narrowed with forward head flexion and some evidence of right sided subclavian vein narrowing. We discussed the need for ergonomic awareness and the role of physical therapy.      Plan:  1. 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 I've instructed her on how to look for a provider in her area.   2. Physical therapy for the subclavian vein compression. Therapy requested.   3. Follow-up 4 months     INTERIM HISTORY 1/13/2023:  8-31-22 One session of PT:   1.  Hypertonicity bilateral scalenes  2.  Decreased cervical spine ROM     [facility] chiropractic, through [facility], Dr. Fletcher since late October. This has been beneficial for having less shoulder tightness, does not need daily shoulder massages. She had had a 5-week break and the shoulder tightness return.     Headaches are uncommon, not affected by the NUCCA.   Her balance problems are not changed.  She finds that the weather changes make her more imbalanced. The trigger seems to be changing barometric pressure and humidity. She was perfectly fine when she was in [place].      Plan:  1. Should try PT, will refer to area close to her home  2. Care for neck protection especially with travel.   3. Follow-up 4-6 months     Interim History 6/16/2023:  She has stopped NUCCA after 6-months dues to inefficacy  She saw a PT 3x and was given some balance exercises, incorporated into her everyday routine. Balance is now better but not cured. She does balance  "exercises, like balancing on one foot with movement. She still has difficulty with closing her eyes while doing this.      She still had head pressure with the exercises, still worse with bending head forward. She will have a wave of feeling motion, like a \"whoosh.\" She can be bothered by this feeling of motion all day, worse at the end of the day. She feels wobbly and unsteady with walking, not noticeable to others.      She does have a benign tremor in her right hand some in the left. She also has trouble falling asleep. When she is driving a car, holding onto her a steering wheel makes her feel better. She can ride her bike and feel better.     After discussing about half a dozen possibilities to treat residual symptoms while potentially having some benefit for her other problems, we landed on three choices in three medication classes:     1. Topiramate 25mg: May be a good for tremor and insomnia (taken at night to start then twice a day)  2. Nortriptyline 10mg: May be a good choice for anxiety and insomnia (taken at night)  3. Propranolol 60mg ER: May be a good choice for anxiety and tremor (taken at night)     Plan:   1. Continue with daily stretching and balance exercises  2. Patient to think about pros and cons of the medication choices and let me know what she decides  3. Follow-up 6-months    Interim History 12/22/2023:  On 11-30-23 she started propranolol ER 60mg.   She has had some lower resting heart rate down to the low 50's. She has not had more fatigue. She has noticed some insomnia, but not clearly medicine related. The tremor has not changed. The anxiety has not changed.     She is about the same symptom-wise with the head pressure.   She still has had head pressure, but it's a bit better over time, especially at the skull base.     Doing step aerobics can make her dizzy. She can bike, lift weights, and do the elliptical. No falls. The internal motion is present throughout the day. She has been " doing online vestibular therapy through prerecorded instructions since last week.     PLAN:  Stay on propranolol 60mg ER for a longer observation period  Check in at the end of January about the status of dizziness, anxiety, and tremor at which point we'll consider raising the dose.  Mindfulness of posture, especially head flexion  Follow-up 6-months       DATA:  I personally reviewed the following data.    Last brain imaging:  CTV Head w Contrast  Narrative: EXAM: CTV HEAD NECK W CONTRAST 6/23/2022 10:28 AM   Reconstruction by the Radiologist on 3D workstation    History:  62F with severe head pressure, imbalance, questions venous  stenosis, styloid length.; Dysequilibrium; Compression of vein; TOS  (thoracic outlet syndrome); Muscle spasm; Pressure in head  Findings:    HEAD:  Head CTV demonstrates patent major dural and deep intracranial venous  sinuses with no evidence of thrombosis.    NECK  Patent major cervical veins with no evidence of thrombosis.     The styloid process  on the RIGHT measures 2.3 cm.     The styloid process on the LEFT measures 2.3 cm.    NEUTRAL POSITION:    The RIGHT upper internal jugular vein, anterior to the C1 transverse  process, demonstrates mild narrowing.     The LEFT upper internal jugular vein, anterior to the C1 transverse  process, demonstrates mild narrowing.     The mid and lower internal jugular veins are unremarkable.    FLEXED POSITION:  The RIGHT upper internal jugular vein, anterior to the C1 transverse  process, demonstrates moderate narrowing.     The LEFT upper internal jugular vein, anterior to the C1 transverse  process, demonstrates moderate narrowing.     Near complete effacement of the cervical segment of the right internal  jugular as it intersperses between the posterior belly of the  digastric and the transverse process of C2. The remaining mid and  lower internal jugular veins are unremarkable.    The visualized lung apices appear unremarkable.   Impression:  Impression:    1.  CTV of the head reveals patent major intracranial veins and dural  venous sinuses with no evidence of thrombus.  2.  CTV of the neck reveals patent major cervical veins with no  evidence of thrombus.   3. In neutral position, the upper internal jugular veins demonstrate  mild narrowing narrowing bilaterally. With neck flexion, there is  moderate narrowing of the upper left internal jugular vein.   4. Near complete effacement of the right internal jugular vein in  flexion between the digastric and C2 transverse process. Findings are  of indeterminate clinical significance.    I have personally reviewed the examination and initial interpretation  and I agree with the findings.    THERESA SAMUEL MD     23-minutes were spent in evaluation, counseling, and documentation on the date of service.

## 2023-12-22 ENCOUNTER — VIRTUAL VISIT (OUTPATIENT)
Dept: NEUROLOGY | Facility: CLINIC | Age: 63
End: 2023-12-22
Payer: COMMERCIAL

## 2023-12-22 VITALS — HEIGHT: 64 IN | WEIGHT: 180 LBS | BODY MASS INDEX: 30.73 KG/M2

## 2023-12-22 DIAGNOSIS — M62.838 MUSCLE SPASM: ICD-10-CM

## 2023-12-22 DIAGNOSIS — R25.1 TREMOR: ICD-10-CM

## 2023-12-22 DIAGNOSIS — R51.9 PRESSURE IN HEAD: ICD-10-CM

## 2023-12-22 DIAGNOSIS — G43.109 MIGRAINE WITH AURA AND WITHOUT STATUS MIGRAINOSUS, NOT INTRACTABLE: ICD-10-CM

## 2023-12-22 DIAGNOSIS — I87.1 COMPRESSION OF VEIN: ICD-10-CM

## 2023-12-22 DIAGNOSIS — M24.20 EAGLE'S SYNDROME: ICD-10-CM

## 2023-12-22 DIAGNOSIS — R42 DYSEQUILIBRIUM: Primary | ICD-10-CM

## 2023-12-22 PROCEDURE — 99213 OFFICE O/P EST LOW 20 MIN: CPT | Mod: 95 | Performed by: PSYCHIATRY & NEUROLOGY

## 2023-12-22 ASSESSMENT — PAIN SCALES - GENERAL: PAINLEVEL: NO PAIN (0)

## 2023-12-22 NOTE — LETTER
12/22/2023       RE: Gisela Pak  [address]  [facility] [address]       Dear Colleague,    Thank you for referring your patient, Giseal Pak, to the [facility]UROLOG[facility] [place] [facility]. Please see a copy of my visit note below.    The patient is being evaluated via a billable video visit.    How would you like to obtain your AVS? MyChart  If the video visit is dropped, the invitation should be resent by: Send to e-mail at: [email or web address]  Will anyone else be joining your video visit? No        Follow-up 6-1-22, 7-29-22, 1-13-23:  Gisela Pak is a 63 year old female with a past medical history of severe head pressure worse being reclined, myofascial pain, and imbalance with veering to the left. The balance issues have been present since Feb 2021. Headaches have been present since her 20's. She had a normal exam on 6-1-22 except for tenderness over the anterior scalenes and sternoclavicular junction, worse on the right side.      Head pressure that wakes her up in the morning and is better with being upright was worrisome for raised intracranial pressure. Given that she has right sided predominant thoracic outlet tenderness, we worked her up for venous compression in the neck and upper shoulder. Non-contrast brain MRI was previously normal.      Interim History 7-29-22:  Ultrasound 6-9-22:  1. RIGHT Subclavian: A. 8.79 velocity ratio from the lateral to mid subclavian vein suggests stenosis.     CTV Head and Neck 6-23-22:  In neutral position, the upper internal jugular veins demonstrate mild narrowing narrowing bilaterally. With neck flexion, there is moderate narrowing of the upper left internal jugular vein. Near complete effacement of the right internal jugular vein in flexion between the digastric and C2 transverse process.     Patient was better in April and May but symptoms have been worse in the last  couple of months. Symptoms are worse with weather changes, mostly related with humidity and barometric pressures. She was just in [place] and she was almost asymptomatic. The weather was very even. Going up elevation to 3000 feet, she became nauseated but it didn't make the headache worse. The weather changes in general can cause head pressure. She does not have a problem with flying. Symptoms can be triggered by forward head flexion.      Pathophysiology reviewed with both internal jugular veins narrowed with forward head flexion and some evidence of right sided subclavian vein narrowing. We discussed the need for ergonomic awareness and the role of physical therapy.      Plan:  1. 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 I've instructed her on how to look for a provider in her area.   2. Physical therapy for the subclavian vein compression. Therapy requested.   3. Follow-up 4 months     INTERIM HISTORY 1/13/2023:  8-31-22 One session of PT:   1.  Hypertonicity bilateral scalenes  2.  Decreased cervical spine ROM     [facility] chiropractic, through [facility], Dr. Fletcher since late October. This has been beneficial for having less shoulder tightness, does not need daily shoulder massages. She had had a 5-week break and the shoulder tightness return.     Headaches are uncommon, not affected by the NUCCA.   Her balance problems are not changed.  She finds that the weather changes make her more imbalanced. The trigger seems to be changing barometric pressure and humidity. She was perfectly fine when she was in [place].      Plan:  1. Should try PT, will refer to area close to her home  2. Care for neck protection especially with travel.   3. Follow-up 4-6 months     Interim History 6/16/2023:  She has stopped NU[facility] after 6-months dues to inefficacy  She saw a PT 3x and was given some balance exercises, incorporated into her everyday routine.  "Balance is now better but not cured. She does balance exercises, like balancing on one foot with movement. She still has difficulty with closing her eyes while doing this.      She still had head pressure with the exercises, still worse with bending head forward. She will have a wave of feeling motion, like a \"whoosh.\" She can be bothered by this feeling of motion all day, worse at the end of the day. She feels wobbly and unsteady with walking, not noticeable to others.      She does have a benign tremor in her right hand some in the left. She also has trouble falling asleep. When she is driving a car, holding onto her a steering wheel makes her feel better. She can ride her bike and feel better.     After discussing about half a dozen possibilities to treat residual symptoms while potentially having some benefit for her other problems, we landed on three choices in three medication classes:     1. Topiramate 25mg: May be a good for tremor and insomnia (taken at night to start then twice a day)  2. Nortriptyline 10mg: May be a good choice for anxiety and insomnia (taken at night)  3. Propranolol 60mg ER: May be a good choice for anxiety and tremor (taken at night)     Plan:   1. Continue with daily stretching and balance exercises  2. Patient to think about pros and cons of the medication choices and let me know what she decides  3. Follow-up 6-months    Interim History 12/22/2023:  On 11-30-23 she started propranolol ER 60mg.   She has had some lower resting heart rate down to the low 50's. She has not had more fatigue. She has noticed some insomnia, but not clearly medicine related. The tremor has not changed. The anxiety has not changed.     She is about the same symptom-wise with the head pressure.   She still has had head pressure, but it's a bit better over time, especially at the skull base.     Doing step aerobics can make her dizzy. She can bike, lift weights, and do the elliptical. No falls. The internal " motion is present throughout the day. She has been doing online vestibular therapy through prerecorded instructions since last week.     PLAN:  Stay on propranolol 60mg ER for a longer observation period  Check in at the end of January about the status of dizziness, anxiety, and tremor at which point we'll consider raising the dose.  Mindfulness of posture, especially head flexion  Follow-up 6-months       DATA:  I personally reviewed the following data.    Last brain imaging:  CTV Head w Contrast  Narrative: EXAM: CTV HEAD NECK W CONTRAST 6/23/2022 10:28 AM   Reconstruction by the Radiologist on 3D workstation    History:  62F with severe head pressure, imbalance, questions venous  stenosis, styloid length.; Dysequilibrium; Compression of vein; TOS  (thoracic outlet syndrome); Muscle spasm; Pressure in head  Findings:    HEAD:  Head CTV demonstrates patent major dural and deep intracranial venous  sinuses with no evidence of thrombosis.    NECK  Patent major cervical veins with no evidence of thrombosis.     The styloid process  on the RIGHT measures 2.3 cm.     The styloid process on the LEFT measures 2.3 cm.    NEUTRAL POSITION:    The RIGHT upper internal jugular vein, anterior to the C1 transverse  process, demonstrates mild narrowing.     The LEFT upper internal jugular vein, anterior to the C1 transverse  process, demonstrates mild narrowing.     The mid and lower internal jugular veins are unremarkable.    FLEXED POSITION:  The RIGHT upper internal jugular vein, anterior to the C1 transverse  process, demonstrates moderate narrowing.     The LEFT upper internal jugular vein, anterior to the C1 transverse  process, demonstrates moderate narrowing.     Near complete effacement of the cervical segment of the right internal  jugular as it intersperses between the posterior belly of the  digastric and the transverse process of C2. The remaining mid and  lower internal jugular veins are unremarkable.    The  visualized lung apices appear unremarkable.   Impression: Impression:    1.  CTV of the head reveals patent major intracranial veins and dural  venous sinuses with no evidence of thrombus.  2.  CTV of the neck reveals patent major cervical veins with no  evidence of thrombus.   3. In neutral position, the upper internal jugular veins demonstrate  mild narrowing narrowing bilaterally. With neck flexion, there is  moderate narrowing of the upper left internal jugular vein.   4. Near complete effacement of the right internal jugular vein in  flexion between the digastric and C2 transverse process. Findings are  of indeterminate clinical significance.    I have personally reviewed the examination and initial interpretation  and I agree with the findings.    THERESA SAMUEL MD     23-minutes were spent in evaluation, counseling, and documentation on the date of service.      Again, thank you for allowing me to participate in the care of your patient.      Sincerely,    Alana MARIN Cha, MD

## 2023-12-22 NOTE — NURSING NOTE
Is the patient currently in the state of [place]? YES    Visit mode:VIDEO    If the visit is dropped, the patient can be reconnected by: VIDEO VISIT: Text to cell phone:   Telephone Information:   Mobile [phone]       Will anyone else be joining the visit? NO  (If patient encounters technical issues they should call [phone] :[id number])    How would you like to obtain your AVS? MyChart    Are changes needed to the allergy or medication list? No    Reason for visit: Follow Up    Jesica CABRERA

## 2023-12-28 ENCOUNTER — TELEPHONE (OUTPATIENT)
Dept: NEUROLOGY | Facility: CLINIC | Age: 63
End: 2023-12-28
Payer: COMMERCIAL

## 2023-12-28 NOTE — TELEPHONE ENCOUNTER
Left Voicemail (2nd Attempt) for the patient to call back and schedule the following:    Appointment type: Return Neurology Video Visit  Provider: Jenn  Return date: 6/20/24 or near  Specialty phone number: [phone]  Additional appointment(s) needed: JOVITA  Additonal Notes: JOVITA Atkins on 12/28/2023 at 3:29 PM

## 2023-12-28 NOTE — TELEPHONE ENCOUNTER
Left Voicemail (1st Attempt) and Sent Myctatet (1st Attempt) for the patient to call back and schedule the following:    Appointment type: Follow up  Provider:   Return date: 6 months around June 2024  Specialty phone number: [phone]  Additional appointment(s) needed: N/A  Additonal Notes: LVM and sent Irving Buckley on 12/28/2023 at 3:20 PM

## 2024-01-05 ENCOUNTER — ANCILLARY PROCEDURE (OUTPATIENT)
Dept: BONE DENSITY | Facility: CLINIC | Age: 64
End: 2024-01-05
Attending: FAMILY MEDICINE
Payer: COMMERCIAL

## 2024-01-05 ENCOUNTER — ANCILLARY PROCEDURE (OUTPATIENT)
Dept: MAMMOGRAPHY | Facility: CLINIC | Age: 64
End: 2024-01-05
Attending: FAMILY MEDICINE
Payer: COMMERCIAL

## 2024-01-05 DIAGNOSIS — Z12.31 ENCOUNTER FOR SCREENING MAMMOGRAM FOR BREAST CANCER: ICD-10-CM

## 2024-01-05 DIAGNOSIS — Z78.0 ASYMPTOMATIC POSTMENOPAUSAL STATUS: ICD-10-CM

## 2024-01-05 PROBLEM — M85.851 OSTEOPENIA OF BOTH HIPS: Status: ACTIVE | Noted: 2018-12-14

## 2024-01-05 PROBLEM — M85.852 OSTEOPENIA OF BOTH HIPS: Status: ACTIVE | Noted: 2018-12-14

## 2024-01-05 PROCEDURE — 77067 SCR MAMMO BI INCL CAD: CPT | Mod: GC

## 2024-01-05 PROCEDURE — 77080 DXA BONE DENSITY AXIAL: CPT

## 2024-01-05 PROCEDURE — 77063 BREAST TOMOSYNTHESIS BI: CPT | Mod: GC

## 2024-01-06 NOTE — RESULT ENCOUNTER NOTE
Gray Higgins,  The result of your recent DEXA scan is abnormal.  The density of your bones is thinner than expected. This is called low bone density (osteopenia) when there is mild to moderate thinning and osteoporosis when there  is moderate to severe thinning. This may put you at risk for a fracture.    You have low bone density (osteopenia).    Compared to your previous DEXA scan, these results appear slightly worse but a direct comparison cannot be made when studies are done on different machines.  (Going forward we'll have you repeat your DEXA scans at the [facility].)    I do not recommend prescription bone-preserving medication at this time, but we should continue to monitor this with a repeat DEXA scan in 3-5 years.    To help prevent further loss of bone, the following is recommended:    Take in adequate amounts of Calcium and Vitamin D. These are the building blocks for bones. Most women will need 1500mg of Calcium and 1000 international units of Vitamin D daily.  It is best to get your calcium through your diet; if you get 3 servings of dairy products daily you should not need calcium supplements.      Exercise daily to keep your bones strong. Thirty minutes of moderate walking or other aerobic activity is recommended.    If you are a smoker, make an appointment to discuss smoking cessation.    If you have any questions or concerns, please schedule an appointment with me to further discuss these results.       Mel Beavers MD

## 2024-01-30 ENCOUNTER — TELEPHONE (OUTPATIENT)
Dept: GASTROENTEROLOGY | Facility: CLINIC | Age: 64
End: 2024-01-30
Payer: COMMERCIAL

## 2024-01-30 NOTE — TELEPHONE ENCOUNTER
"Endoscopy Scheduling Screen    Have you had a positive Covid test in the last 14 days?  No    Are you active on MyChart?   Yes    What insurance is in the chart?  Other:  [employer]    Ordering/Referring Provider:     Mel Beavers MD in [facility]      (If ordering provider performs procedure, schedule with ordering provider unless otherwise instructed. )    BMI: Estimated body mass index is 31.11 kg/m  as calculated from the following:    Height as of 12/22/23: 1.62 m (5' 3.78\").    Weight as of 12/22/23: 81.6 kg (180 lb).     Sedation Ordered  moderate sedation.   If patient BMI > 50 do not schedule in [facility].    If patient BMI > 45 do not schedule at [facility].    Are you taking methadone or Suboxone?  No    Have you had difficulties, pain, or discomfort during past endoscopy procedures?  No    Are you taking any prescription medications for pain 3 or more times per week?   NO - No RN review required.    Do you have a history of malignant hyperthermia or adverse reaction to anesthesia?  No    (Females) Are you currently pregnant?   No     Have you been diagnosed or told you have pulmonary hypertension?   No    Do you have an LVAD?  No    Have you been told you have moderate to severe sleep apnea?  No    Have you been told you have COPD, asthma, or any other lung disease?  No    Do you have any heart conditions?  No     Have you ever had an organ transplant?   No    Have you ever had or are you awaiting a heart or lung transplant?   No    Have you had a stroke or transient ischemic attack (TIA aka \"mini stroke\" in the last 6 months?   No    Have you been diagnosed with or been told you have cirrhosis of the liver?   No    Are you currently on dialysis?   No    Do you need assistance transferring?   No    BMI: Estimated body mass index is 31.11 kg/m  as calculated from the following:    Height as of 12/22/23: 1.62 m (5' 3.78\").    Weight as of 12/22/23: 81.6 kg (180 lb).     Is patients BMI > 40 and scheduling " location [facility]?  No    Do you take an injectable medication for weight loss or diabetes (excluding insulin)?  No    Do you take the medication Naltrexone?  No    Do you take blood thinners?  No       Prep   Are you currently on dialysis or do you have chronic kidney disease?  No    Do you have a diagnosis of diabetes?  No    Do you have a diagnosis of cystic fibrosis (CF)?  No    On a regular basis do you go 3 -5 days between bowel movements?  No    BMI > 40?  No    Preferred Pharmacy:    [facility] - [place], [place] - [address] [place] [address]  Phone: [phone] Fax: [phone]      Final Scheduling Details   Colonoscopy prep sent?  Standard MiraLAX    Procedure scheduled  Colonoscopy    Surgeon:  Shabnam     Date of procedure:  06/04/2024     Pre-OP / PAC:   No - Not required for this site.    Location  [facility] - [facility] - Patient preference.    Sedation   Moderate Sedation - Patient preference.      Patient Reminders:   You will receive a call from a Nurse to review instructions and health history.  This assessment must be completed prior to your procedure.  Failure to complete the Nurse assessment may result in the procedure being cancelled.      On the day of your procedure, please designate an adult(s) who can drive you home stay with you for the next 24 hours. The medicines used in the exam will make you sleepy. You will not be able to drive.      You cannot take public transportation, ride share services, or non-medical taxi service without a responsible caregiver.  Medical transport services are allowed with the requirement that a responsible caregiver will receive you at your destination.  We require that drivers and caregivers are confirmed prior to your procedure.

## 2024-02-14 ENCOUNTER — NURSE TRIAGE (OUTPATIENT)
Dept: FAMILY MEDICINE | Facility: CLINIC | Age: 64
End: 2024-02-14

## 2024-02-14 ENCOUNTER — VIRTUAL VISIT (OUTPATIENT)
Dept: FAMILY MEDICINE | Facility: CLINIC | Age: 64
End: 2024-02-14
Payer: COMMERCIAL

## 2024-02-14 DIAGNOSIS — R10.11 RUQ ABDOMINAL PAIN: Primary | ICD-10-CM

## 2024-02-14 PROCEDURE — 99214 OFFICE O/P EST MOD 30 MIN: CPT | Mod: 95 | Performed by: FAMILY MEDICINE

## 2024-02-14 NOTE — TELEPHONE ENCOUNTER
"Call received from patient:    Rib discomfort  2. Sometimes bilateral, but R > L  3. Going on for a while-started around 3 weeks ago  4. It is \"uncomfortable\"  5. No injury/strenuous exercise  6. Not resolving  7. Dull ache  8. Does not always notice the ache but with sitting feels it a little more  9. No SOB  10. Goes out walking and does not notice any breathlessness with walking  11. Sometimes pain radiates down her flank, bilaterally  12. Sees Neurologist for Wilson Syndrome, started patient on Propranolol and was not feeling great; discontinued it and then restarted it, then stopped it one week ago  13. Has dizziness/lightheaded at baseline but no changes  14. Increased amount of bowel movements  15. Feeling full and not wanted to eat very much; this has started to resolve over the past 1-2 days      Writer recommended provider visit and offered video visit with Dr. Beavers today at 1030.  Patient verbalized understanding and in agreement with plan.    Visit date, time and contact number confirmed with patient.    LAURA Villafana, RN-[facility]              Reason for Disposition   Patient wants to be seen    Additional Information   Negative: Followed an injury to chest   Negative: SEVERE difficulty breathing (e.g., struggling for each breath, speaks in single words)   Negative: Difficult to awaken or acting confused (e.g., disoriented, slurred speech)   Negative: Shock suspected (e.g., cold/pale/clammy skin, too weak to stand, low BP, rapid pulse)   Negative: Passed out (i.e., lost consciousness, collapsed and was not responding)   Negative: Chest pain lasting longer than 5 minutes and ANY of the following:         Pain is crushing, pressure-like, or heavy         Over 44 years old          Over 30 years old and one cardiac risk factor (e.g diabetes, high blood pressure, high cholesterol, smoker, or family history of heart disease)         History of heart disease (e.g. angina, " heart attack, heart failure, bypass surgery, takes nitroglycerin)   Negative: Heart beating < 50 beats per minute OR > 140 beats per minute   Negative: Visible sweat on face or sweat dripping down face   Negative: Sounds like a life-threatening emergency to the triager   Negative: SEVERE chest pain   Negative: Pain also in shoulder(s) or arm(s) or jaw   Negative: Difficulty breathing   Negative: Cocaine use within last 3 days   Negative: Major surgery in the past month   Negative: Hip or leg fracture (broken bone) in past month (or had cast on leg or ankle in past month)   Negative: Illness requiring prolonged bedrest in past month (e.g., immobilization, long hospital stay)   Negative: Long-distance travel in past month (e.g., car, bus, train, plane; with trip lasting 6 or more hours)   Negative: History of prior 'blood clot' in leg or lungs (i.e., deep vein thrombosis, pulmonary embolism)   Negative: History of inherited increased risk of blood clots (e.g., Factor 5 Leiden, Anti-thrombin 3, Protein C or Protein S deficiency, Prothrombin mutation)   Negative: Cancer treatment in the past two months (or has cancer now)   Negative: Heart beating irregularly or very rapidly   Negative: Chest pain lasting longer than 5 minutes and occurred in last 3 days (72 hours) (Exception: Feels exactly the same as previously diagnosed heartburn and has accompanying sour taste in mouth.)   Negative: Chest pain or 'angina' comes and goes and is happening more often (increasing in frequency) or getting worse (increasing in severity) (Exception: Chest pains that last only a few seconds.)   Negative: Dizziness or lightheadedness   Negative: Coughing up blood   Negative: Patient sounds very sick or weak to the triager   Negative: Patient says chest pain feels exactly the same as previously diagnosed 'heartburn' and describes burning in chest and accompanying sour taste in mouth   Negative: Fever > 100.4 F (38.0 C)   Negative: Chest  pain(s) lasting a few seconds persists > 3 days   Negative: Rash in same area as pain (may be described as 'small blisters')   Negative: All other patients with chest pain (Exception: Fleeting chest pain lasting a few seconds.)    Protocols used: Chest Pain-A-[place]

## 2024-02-14 NOTE — PROGRESS NOTES
Gisela is a 63 year old who is being evaluated via a billable video visit.            Assessment & Plan     RUQ abdominal pain  RUQ/lower rib/thoracic side pain with no alarm features.  Ddx is broad and includes: GB or biliary disease (stone disease or tumors), liver disease (hepatitis, tumor, abscess), pancreatic disease (pancreatitis or cancer), sphincter of Oddi dysfunction, kidney disease (nephrolithiasis or pyelonephritis), PUD, dyspepsia, appendicitis, RLL pneumonia, MSK pain, spine disease with neuropathic/radicular pain, early zoster, intestinal ischemia.  We'll start work-up with the following labs.  I recommended she take daily H2 blocker (Pepcid) for the next couple of weeks.  She'll see chiropractor as scheduled.  Then follow-up with me as scheduled in 6 weeks - notify clinic sooner if worsening.  Discussed that she should go to ER for any pain that is severe, exertional and not relieved with rest, or is associated with chest pain, shortness of breath, or other worsening/worrisome features.    - CBC with platelets and differential  - Comprehensive metabolic panel (BMP + Alb, Alk Phos, ALT, AST, Total. Bili, TP)  - Lipase      Subjective   Gisela is a 63 year old, presenting for the following health issues:  Rib Pain      2/14/2024     9:39 AM   Additional Questions   Roomed by EBEN SALGADO     History of Present Illness       Reason for visit:  Dull ache primarily underneath breast bone, mostly right, but also on left side  I can feel it down the side of the front at times  Symptom onset:  3-4 weeks ago  Symptoms include:  Dull ache, feeling full  Symptom intensity:  Mild  Symptom progression:  Worsening  Had these symptoms before:  No  What makes it worse:  I can feel it more if I'm sitting. The pressure from my bra bugs me.  What makes it better:  Standing? Not really, it's just there.    She eats 2-3 servings of fruits and vegetables daily.She consumes 0 sweetened beverage(s) daily.She exercises with  "enough effort to increase her heart rate 30 to 60 minutes per day.  She exercises with enough effort to increase her heart rate 6 days per week.   She is taking medications regularly.     Feels \"like a stitch\" in her right lower rib cage.  Sometimes on the left lower rib cage but usually on the right.  Back has flared up and she has upcoming chiropractor appt. with a [facility] provider.  It is intermittent but today she's had the pain consistently - she woke up with it.  It is mild.  She also notes decreased appetite for the past week and has lost 2-3#  No nausea/vomiting.  Some looser stools.  No abdominal pain but maybe some tenderness in RUQ.  Some heartburn.  Took a Pepcid last night.  No rashes.  Better if she's moving around - more noticeable if she's sitting.  Feels better if she stretches.  No shortness of breath, palpitations.  Slight cough.  She did have a hard fall on the ice about 6 weeks ago with no injury.  Current symptoms started a couple weeks after that.  Neuro recommended trial of propranolol for migraines, which she tried for a while twice (for several weeks in Dec and again recently). She just \"didn't feel right\" on it so stopped it both times.  She took last dose 2/8.          Objective           Vitals:  No vitals were obtained today due to virtual visit.    Physical Exam   GENERAL: alert and no distress  EYES: Eyes grossly normal to inspection.  No discharge or erythema, or obvious scleral/conjunctival abnormalities.  RESP: No audible wheeze, cough, or visible cyanosis.    SKIN: Visible skin clear. No significant rash, abnormal pigmentation or lesions.  NEURO: Cranial nerves grossly intact.  Mentation and speech appropriate for age.  PSYCH: Appropriate affect, tone, and pace of words        Video-Visit Details    Type of service:  Video Visit   10:35 AM   10:54 AM   Originating Location (pt. Location): Home    Distant Location (provider location):  Off-site  Platform used for Video Visit: " Alexandre  Signed Electronically by: Mel Beavers MD

## 2024-02-15 ENCOUNTER — LAB (OUTPATIENT)
Dept: LAB | Facility: CLINIC | Age: 64
End: 2024-02-15
Payer: COMMERCIAL

## 2024-02-15 DIAGNOSIS — R10.11 RUQ ABDOMINAL PAIN: ICD-10-CM

## 2024-02-15 LAB
ALBUMIN SERPL BCG-MCNC: 4.6 G/DL (ref 3.5–5.2)
ALP SERPL-CCNC: 55 U/L (ref 40–150)
ALT SERPL W P-5'-P-CCNC: 14 U/L (ref 0–50)
ANION GAP SERPL CALCULATED.3IONS-SCNC: 12 MMOL/L (ref 7–15)
AST SERPL W P-5'-P-CCNC: 16 U/L (ref 0–45)
BASOPHILS # BLD AUTO: 0 10E3/UL (ref 0–0.2)
BASOPHILS NFR BLD AUTO: 1 %
BILIRUB SERPL-MCNC: 0.6 MG/DL
BUN SERPL-MCNC: 15.5 MG/DL (ref 8–23)
CALCIUM SERPL-MCNC: 9.7 MG/DL (ref 8.8–10.2)
CHLORIDE SERPL-SCNC: 104 MMOL/L (ref 98–107)
CREAT SERPL-MCNC: 0.81 MG/DL (ref 0.51–0.95)
DEPRECATED HCO3 PLAS-SCNC: 24 MMOL/L (ref 22–29)
EGFRCR SERPLBLD CKD-EPI 2021: 82 ML/MIN/1.73M2
EOSINOPHIL # BLD AUTO: 0.1 10E3/UL (ref 0–0.7)
EOSINOPHIL NFR BLD AUTO: 1 %
ERYTHROCYTE [DISTWIDTH] IN BLOOD BY AUTOMATED COUNT: 12.3 % (ref 10–15)
GLUCOSE SERPL-MCNC: 97 MG/DL (ref 70–99)
HCT VFR BLD AUTO: 40.8 % (ref 35–47)
HGB BLD-MCNC: 13.5 G/DL (ref 11.7–15.7)
IMM GRANULOCYTES # BLD: 0 10E3/UL
IMM GRANULOCYTES NFR BLD: 0 %
LIPASE SERPL-CCNC: 35 U/L (ref 13–60)
LYMPHOCYTES # BLD AUTO: 1.6 10E3/UL (ref 0.8–5.3)
LYMPHOCYTES NFR BLD AUTO: 26 %
MCH RBC QN AUTO: 31.3 PG (ref 26.5–33)
MCHC RBC AUTO-ENTMCNC: 33.1 G/DL (ref 31.5–36.5)
MCV RBC AUTO: 95 FL (ref 78–100)
MONOCYTES # BLD AUTO: 0.6 10E3/UL (ref 0–1.3)
MONOCYTES NFR BLD AUTO: 9 %
NEUTROPHILS # BLD AUTO: 3.9 10E3/UL (ref 1.6–8.3)
NEUTROPHILS NFR BLD AUTO: 63 %
PLATELET # BLD AUTO: 228 10E3/UL (ref 150–450)
POTASSIUM SERPL-SCNC: 4 MMOL/L (ref 3.4–5.3)
PROT SERPL-MCNC: 7 G/DL (ref 6.4–8.3)
RBC # BLD AUTO: 4.31 10E6/UL (ref 3.8–5.2)
SODIUM SERPL-SCNC: 140 MMOL/L (ref 135–145)
WBC # BLD AUTO: 6.2 10E3/UL (ref 4–11)

## 2024-02-15 PROCEDURE — 85025 COMPLETE CBC W/AUTO DIFF WBC: CPT

## 2024-02-15 PROCEDURE — 36415 COLL VENOUS BLD VENIPUNCTURE: CPT

## 2024-02-15 PROCEDURE — 80053 COMPREHEN METABOLIC PANEL: CPT

## 2024-02-15 PROCEDURE — 83690 ASSAY OF LIPASE: CPT

## 2024-02-18 NOTE — RESULT ENCOUNTER NOTE
Gray Higgins,  Your labs are all normal which is reassuring.  We'll how your symptoms evolve with the chiropractic care and Pepcid (famotidine).      Mel Beavers MD

## 2024-03-28 ENCOUNTER — OFFICE VISIT (OUTPATIENT)
Dept: FAMILY MEDICINE | Facility: CLINIC | Age: 64
End: 2024-03-28
Payer: COMMERCIAL

## 2024-03-28 ENCOUNTER — ANCILLARY PROCEDURE (OUTPATIENT)
Dept: GENERAL RADIOLOGY | Facility: CLINIC | Age: 64
End: 2024-03-28
Attending: FAMILY MEDICINE
Payer: COMMERCIAL

## 2024-03-28 VITALS
HEART RATE: 81 BPM | SYSTOLIC BLOOD PRESSURE: 122 MMHG | TEMPERATURE: 97.5 F | BODY MASS INDEX: 31.71 KG/M2 | OXYGEN SATURATION: 100 % | DIASTOLIC BLOOD PRESSURE: 88 MMHG | WEIGHT: 179 LBS | RESPIRATION RATE: 20 BRPM | HEIGHT: 63 IN

## 2024-03-28 DIAGNOSIS — R10.12 LUQ ABDOMINAL PAIN: ICD-10-CM

## 2024-03-28 DIAGNOSIS — R07.81 PAIN IN RIB: ICD-10-CM

## 2024-03-28 DIAGNOSIS — R07.81 PAIN IN RIB: Primary | ICD-10-CM

## 2024-03-28 DIAGNOSIS — R10.11 RUQ ABDOMINAL PAIN: ICD-10-CM

## 2024-03-28 PROCEDURE — 99213 OFFICE O/P EST LOW 20 MIN: CPT | Performed by: FAMILY MEDICINE

## 2024-03-28 PROCEDURE — G2211 COMPLEX E/M VISIT ADD ON: HCPCS | Performed by: FAMILY MEDICINE

## 2024-03-28 PROCEDURE — 71046 X-RAY EXAM CHEST 2 VIEWS: CPT | Mod: TC | Performed by: RADIOLOGY

## 2024-03-28 NOTE — PROGRESS NOTES
Assessment & Plan       ICD-10-CM    1. Pain in rib  R07.81 XR Chest 2 Views      2. RUQ abdominal pain  R10.11 XR Chest 2 Views      3. LUQ abdominal pain  R10.12 XR Chest 2 Views         Persistent pain (x 2 months) at the juncture of her chest and abdomen bilaterally.  No clear etiology thus far with labs and exam.  Given that movement (e.g., stretching) can alleviate the pain and direct pressure can exacerbate the pain, I suspect that this is MSK in nature.  However, epigastric palpation caused radiation of pain around to her back, so other considerations would be a left thoracic radiculopathy (although she also has similar pain on the right) or potentially a gastric or pancreatic etiology.  I'd like to get a CXR today to rule out any obvious intrathoracic or rib abnormalities.  If her symptoms persist we could consider imaging the abdomen (US or CT), chest CT or thoracic spine CT.  If we become more suspicious of gastric etiology we could consider EGD.  Currently she has no red flag signs or symptoms and she'll continue to monitor her symptoms, notifying me of any worsening.    The longitudinal plan of care for the diagnosis(es)/condition(s) as documented were addressed during this visit. Due to the added complexity in care, I will continue to support Gisela in the subsequent management and with ongoing continuity of care.     Tania Higgins is a 64 year old, presenting for the following health issues:  Abdominal Pain        3/28/2024    12:49 PM   Additional Questions   Roomed by iris   Accompanied by self     History of Present Illness       Reason for visit:  Pain under ribs in front and general pain in front under ribs    She eats 2-3 servings of fruits and vegetables daily.She consumes 0 sweetened beverage(s) daily.She exercises with enough effort to increase her heart rate 30 to 60 minutes per day.  She exercises with enough effort to increase her heart rate 6 days per week.   She is taking  "medications regularly.     Follow-Up bilateral anterior lower rib cage pain  I saw her for this a month ago via virtual visit and we did labs at that time.  CBC, CMP, lipase all WNL.   Lower rib pain persists, still R>L, still intermittent.  Worse when she's in a fixed position (like driving) and better when she's stretching it out.  She previously noted that the band of her bra irritated it.  She wonders if the pain is coming down from her right shoulder or somehow related to her thoracic outlet syndrome.  Back pain gradually improved.  Appetite is back to normal.    Still having looser stools.  Is overdue for routine screening colonoscopy.  Mild generalized abdominal cramping but not necessarily related to having a bowel movement.    Heartburn is improved.  Uses Pepcid occasionally which helps the heartburn but not the rib pain.  Continues to drink 3-5 cups of coffee which she suspects contributes to her heartburn.    No cough, shortness of breath, chest pain  No new numbness, tingling, weakness     Feels better overall since stopping the propranolol.    Wt Readings from Last 5 Encounters:   03/28/24 81.2 kg (179 lb)   12/22/23 81.6 kg (180 lb)   11/28/23 81.6 kg (180 lb)   11/22/22 78.9 kg (174 lb)   08/22/22 79.8 kg (176 lb)      ROS as above       Objective    /88 (BP Location: Right arm, Patient Position: Sitting, Cuff Size: Adult Regular)   Pulse 81   Temp 97.5  F (36.4  C) (Temporal)   Resp 20   Ht 1.61 m (5' 3.39\")   Wt 81.2 kg (179 lb)   LMP 09/14/2011   SpO2 100%   BMI 31.32 kg/m    Body mass index is 31.32 kg/m .  Physical Exam   GENERAL: healthy, alert and no distress  EYES: Eyes grossly normal to inspection, conjunctivae and sclerae normal  RESP: lungs clear to auscultation - no rales, rhonchi or wheezes  CV: regular rate and rhythm, normal S1 S2, no S3 or S4, no murmur, click or rub  CHEST: no tenderness with direct palpation over the lower ribs  ABDOMEN: soft, no hepatosplenomegaly, no " masses, palpation of epigastrium/LUQ causes sudden shooting pain radiating around to her back  MS: no gross musculoskeletal defects noted, no edema  SKIN: no suspicious lesions or rashes, particularly dermatomal rashes of her torso  NEURO: Grossly normal strength and tone, mentation intact and speech normal  PSYCH: mentation appears normal, affect normal/bright    Lab on 02/15/2024   Component Date Value Ref Range Status    Sodium 02/15/2024 140  135 - 145 mmol/L Final    Reference intervals for this test were updated on 09/26/2023 to more accurately reflect our healthy population. There may be differences in the flagging of prior results with similar values performed with this method. Interpretation of those prior results can be made in the context of the updated reference intervals.     Potassium 02/15/2024 4.0  3.4 - 5.3 mmol/L Final    Carbon Dioxide (CO2) 02/15/2024 24  22 - 29 mmol/L Final    Anion Gap 02/15/2024 12  7 - 15 mmol/L Final    Urea Nitrogen 02/15/2024 15.5  8.0 - 23.0 mg/dL Final    Creatinine 02/15/2024 0.81  0.51 - 0.95 mg/dL Final    GFR Estimate 02/15/2024 82  >60 mL/min/1.73m2 Final    Calcium 02/15/2024 9.7  8.8 - 10.2 mg/dL Final    Chloride 02/15/2024 104  98 - 107 mmol/L Final    Glucose 02/15/2024 97  70 - 99 mg/dL Final    Alkaline Phosphatase 02/15/2024 55  40 - 150 U/L Final    Reference intervals for this test were updated on 11/14/2023 to more accurately reflect our healthy population. There may be differences in the flagging of prior results with similar values performed with this method. Interpretation of those prior results can be made in the context of the updated reference intervals.    AST 02/15/2024 16  0 - 45 U/L Final    Reference intervals for this test were updated on 6/12/2023 to more accurately reflect our healthy population. There may be differences in the flagging of prior results with similar values performed with this method. Interpretation of those prior results  can be made in the context of the updated reference intervals.    ALT 02/15/2024 14  0 - 50 U/L Final    Reference intervals for this test were updated on 6/12/2023 to more accurately reflect our healthy population. There may be differences in the flagging of prior results with similar values performed with this method. Interpretation of those prior results can be made in the context of the updated reference intervals.      Protein Total 02/15/2024 7.0  6.4 - 8.3 g/dL Final    Albumin 02/15/2024 4.6  3.5 - 5.2 g/dL Final    Bilirubin Total 02/15/2024 0.6  <=1.2 mg/dL Final    Lipase 02/15/2024 35  13 - 60 U/L Final    WBC Count 02/15/2024 6.2  4.0 - 11.0 10e3/uL Final    RBC Count 02/15/2024 4.31  3.80 - 5.20 10e6/uL Final    Hemoglobin 02/15/2024 13.5  11.7 - 15.7 g/dL Final    Hematocrit 02/15/2024 40.8  35.0 - 47.0 % Final    MCV 02/15/2024 95  78 - 100 fL Final    MCH 02/15/2024 31.3  26.5 - 33.0 pg Final    MCHC 02/15/2024 33.1  31.5 - 36.5 g/dL Final    RDW 02/15/2024 12.3  10.0 - 15.0 % Final    Platelet Count 02/15/2024 228  150 - 450 10e3/uL Final    % Neutrophils 02/15/2024 63  % Final    % Lymphocytes 02/15/2024 26  % Final    % Monocytes 02/15/2024 9  % Final    % Eosinophils 02/15/2024 1  % Final    % Basophils 02/15/2024 1  % Final    % Immature Granulocytes 02/15/2024 0  % Final    Absolute Neutrophils 02/15/2024 3.9  1.6 - 8.3 10e3/uL Final    Absolute Lymphocytes 02/15/2024 1.6  0.8 - 5.3 10e3/uL Final    Absolute Monocytes 02/15/2024 0.6  0.0 - 1.3 10e3/uL Final    Absolute Eosinophils 02/15/2024 0.1  0.0 - 0.7 10e3/uL Final    Absolute Basophils 02/15/2024 0.0  0.0 - 0.2 10e3/uL Final    Absolute Immature Granulocytes 02/15/2024 0.0  <=0.4 10e3/uL Final           Signed Electronically by: Mel Beavers MD

## 2024-03-29 NOTE — RESULT ENCOUNTER NOTE
"Hi Gisela,  Your heart, lungs, ribs, and diaphragm all look normal on chest x-ray.  Please don't worry about the comment about \"tortuous aorta\" - not a clinical concern.      Let me know if the pain continues - particularly if you develop any concerning symptoms such as unexplained weight loss, abdominal pain, nausea/vomiting, shortness of breath, chest pain.  And get that colonoscopy done ;)    Mel Beavers MD "

## 2024-05-22 ENCOUNTER — TELEPHONE (OUTPATIENT)
Dept: GASTROENTEROLOGY | Facility: CLINIC | Age: 64
End: 2024-05-22
Payer: COMMERCIAL

## 2024-05-22 DIAGNOSIS — R11.2 NAUSEA WITH VOMITING: Primary | ICD-10-CM

## 2024-05-23 NOTE — TELEPHONE ENCOUNTER
Pre visit planning completed.      Procedure details:    Patient scheduled for Colonoscopy  on 6/4/24.     Arrival time: 0715. Procedure time 0815    [facility] [address] [place] [address], [address], [place] [address]. Check in location: 5th Floor.    Sedation type: Conscious sedation - patient with CS in the past. Generalized anxiety disorder and uses klonopin sparingly per office visit 11/25/23. KIRSTIN-7 score of 8 (mild).    Pre op exam needed? N/A    Indication for procedure: screening       Chart review:     Electronic implanted devices? No    Recent diagnosis of diverticulitis within the last 6 weeks? No    Diabetic? No      Medication review:    Anticoagulants? No    NSAIDS? No    Other medication HOLDING recommendations:  N/A      Prep for procedure:     Bowel prep recommendation: Standard Miralax  Due to: standard bowel prep.    Prep instructions sent via [employer]         Diane Youngblood RN  Endoscopy Procedure Pre Assessment RN  [phone] option 4

## 2024-05-24 RX ORDER — ONDANSETRON 4 MG/1
TABLET, FILM COATED ORAL
Qty: 3 TABLET | Refills: 0 | Status: SHIPPED | OUTPATIENT
Start: 2024-05-24 | End: 2024-09-04

## 2024-05-24 NOTE — TELEPHONE ENCOUNTER
Pre assessment completed for upcoming procedure.      Procedure details:    Patient scheduled for Colonoscopy  on 6/4/24.     Arrival time: 0715. Procedure time 0815     Facility location: [facility]; [address] [place] [address], [address] Floor, [place] [address]. Check in location: 5th Floor.     Sedation type: Conscious sedation - patient with CS in the past. Generalized anxiety disorder and uses klonopin sparingly per office visit 11/25/23. KIRSTIN-7 score of 8 (mild). - patient ok to proceed with conscious sedation.     Pre op exam needed? N/A    Designated  policy reviewed. Instructed to have someone stay 6 hours post procedure.         Prep for procedure:     Reviewed procedure prep instructions.     Patient verbalized understanding and had no questions or concerns at this time.        Diane Askew RN  Endoscopy Procedure Pre Assessment RN  [phone] option 4

## 2024-06-03 NOTE — TELEPHONE ENCOUNTER
Patient wanted to ensure they can take tylenol today and tomorrow morning if needed.  RN informed pt they can take tylenol if needed.    Pt also mentioned they have an issue with drinking magnesium citrate.  Pt was given a prescription for zofran to use as needed.  RN advised pt take a tablet of zofran 30 min prior to magnesium citrate.    Pt has no further questions or concerns.      Cherise Mccarty RN

## 2024-06-03 NOTE — TELEPHONE ENCOUNTER
"Patient called in stating they had 2 scrambled eggs with milk and a ripe banana at 6 AM today 6/3/24. Patient has a colonoscopy scheduled for tomorrow morning 6/4/24.  Patient wanted to make sure this was OK that they ate those foods. Writer advised patient that they should have been a clear liquid diet for the entire day before procedure. Patient reported they thought that just meant clear liquid diet for 24 hours before procedure. Patient stated they have not had any food since 6 AM and will remain on the clear liquid diet. Patient also stated they already started the Dulcolax tablets and will be starting the prep soon.  Advised patient that deviation from prep instructions may result in less than desired outcomes including inadequate bowel prep and procedure may ultimately need to be rescheduled.  Discussed with patient that the small amount of food at 6 AM is probably not going to be an issue but writer did advise patient of the risk of inadequate bowel prep. Patient agreed to risks, wanted to proceed with colonoscopy procedure and stated \"It must be OK, I was reading on the [facility] and the [facility] and they are not as hardcore\".     Patient had no further questions and plans to proceed with colonoscopy scheduled tomorrow morning, 6/4/24.    Malou Cid RN  Endoscopy Procedure Pre-Assessment RN  [phone], option 4    "

## 2024-06-04 ENCOUNTER — HOSPITAL ENCOUNTER (OUTPATIENT)
Facility: AMBULATORY SURGERY CENTER | Age: 64
Discharge: HOME | End: 2024-06-04
Attending: INTERNAL MEDICINE | Admitting: INTERNAL MEDICINE
Payer: COMMERCIAL

## 2024-06-04 VITALS
RESPIRATION RATE: 16 BRPM | BODY MASS INDEX: 29.37 KG/M2 | WEIGHT: 172 LBS | OXYGEN SATURATION: 98 % | TEMPERATURE: 98 F | HEIGHT: 64 IN | DIASTOLIC BLOOD PRESSURE: 71 MMHG | SYSTOLIC BLOOD PRESSURE: 123 MMHG | HEART RATE: 66 BPM

## 2024-06-04 LAB — COLONOSCOPY: NORMAL

## 2024-06-04 PROCEDURE — 45385 COLONOSCOPY W/LESION REMOVAL: CPT | Mod: 33 | Performed by: INTERNAL MEDICINE

## 2024-06-04 PROCEDURE — 88305 TISSUE EXAM BY PATHOLOGIST: CPT | Mod: TC | Performed by: INTERNAL MEDICINE

## 2024-06-04 PROCEDURE — 88305 TISSUE EXAM BY PATHOLOGIST: CPT | Mod: 26 | Performed by: PATHOLOGY

## 2024-06-04 RX ORDER — ONDANSETRON 2 MG/ML
4 INJECTION INTRAMUSCULAR; INTRAVENOUS EVERY 6 HOURS PRN
Status: DISCONTINUED | OUTPATIENT
Start: 2024-06-04 | End: 2024-06-05 | Stop reason: HOSPADM

## 2024-06-04 RX ORDER — NALOXONE HYDROCHLORIDE 0.4 MG/ML
0.2 INJECTION, SOLUTION INTRAMUSCULAR; INTRAVENOUS; SUBCUTANEOUS
Status: DISCONTINUED | OUTPATIENT
Start: 2024-06-04 | End: 2024-06-05 | Stop reason: HOSPADM

## 2024-06-04 RX ORDER — NALOXONE HYDROCHLORIDE 0.4 MG/ML
0.4 INJECTION, SOLUTION INTRAMUSCULAR; INTRAVENOUS; SUBCUTANEOUS
Status: DISCONTINUED | OUTPATIENT
Start: 2024-06-04 | End: 2024-06-05 | Stop reason: HOSPADM

## 2024-06-04 RX ORDER — ONDANSETRON 2 MG/ML
4 INJECTION INTRAMUSCULAR; INTRAVENOUS
Status: DISCONTINUED | OUTPATIENT
Start: 2024-06-04 | End: 2024-06-04 | Stop reason: HOSPADM

## 2024-06-04 RX ORDER — FENTANYL CITRATE 50 UG/ML
INJECTION, SOLUTION INTRAMUSCULAR; INTRAVENOUS DAILY PRN
Status: DISCONTINUED | OUTPATIENT
Start: 2024-06-04 | End: 2024-06-04 | Stop reason: HOSPADM

## 2024-06-04 RX ORDER — PROCHLORPERAZINE MALEATE 10 MG
10 TABLET ORAL EVERY 6 HOURS PRN
Status: DISCONTINUED | OUTPATIENT
Start: 2024-06-04 | End: 2024-06-05 | Stop reason: HOSPADM

## 2024-06-04 RX ORDER — LIDOCAINE 40 MG/G
CREAM TOPICAL
Status: DISCONTINUED | OUTPATIENT
Start: 2024-06-04 | End: 2024-06-04 | Stop reason: HOSPADM

## 2024-06-04 RX ORDER — ONDANSETRON 4 MG/1
4 TABLET, ORALLY DISINTEGRATING ORAL EVERY 6 HOURS PRN
Status: DISCONTINUED | OUTPATIENT
Start: 2024-06-04 | End: 2024-06-05 | Stop reason: HOSPADM

## 2024-06-04 RX ORDER — SODIUM CHLORIDE, SODIUM LACTATE, POTASSIUM CHLORIDE, CALCIUM CHLORIDE 600; 310; 30; 20 MG/100ML; MG/100ML; MG/100ML; MG/100ML
INJECTION, SOLUTION INTRAVENOUS CONTINUOUS
Status: DISCONTINUED | OUTPATIENT
Start: 2024-06-04 | End: 2024-06-04 | Stop reason: HOSPADM

## 2024-06-04 RX ORDER — FLUMAZENIL 0.1 MG/ML
0.2 INJECTION, SOLUTION INTRAVENOUS
Status: ACTIVE | OUTPATIENT
Start: 2024-06-04 | End: 2024-06-04

## 2024-06-04 RX ADMIN — SODIUM CHLORIDE, SODIUM LACTATE, POTASSIUM CHLORIDE, CALCIUM CHLORIDE: 600; 310; 30; 20 INJECTION, SOLUTION INTRAVENOUS at 07:52
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
## 2024-06-05 LAB
PATH REPORT.COMMENTS IMP SPEC: NORMAL
PATH REPORT.COMMENTS IMP SPEC: NORMAL
PATH REPORT.FINAL DX SPEC: NORMAL
PATH REPORT.GROSS SPEC: NORMAL
PATH REPORT.MICROSCOPIC SPEC OTHER STN: NORMAL
PATH REPORT.RELEVANT HX SPEC: NORMAL
PHOTO IMAGE: NORMAL

## 2024-06-12 DIAGNOSIS — G43.109 MIGRAINE WITH AURA AND WITHOUT STATUS MIGRAINOSUS, NOT INTRACTABLE: Primary | ICD-10-CM

## 2024-06-12 RX ORDER — METHYLPREDNISOLONE 4 MG
TABLET, DOSE PACK ORAL
Qty: 21 TABLET | Refills: 0 | Status: SHIPPED | OUTPATIENT
Start: 2024-06-12

## 2024-06-26 DIAGNOSIS — G43.109 MIGRAINE WITH AURA AND WITHOUT STATUS MIGRAINOSUS, NOT INTRACTABLE: Primary | ICD-10-CM

## 2024-06-26 RX ORDER — RIZATRIPTAN BENZOATE 10 MG/1
10 TABLET, ORALLY DISINTEGRATING ORAL
Qty: 8 TABLET | Refills: 3 | Status: SHIPPED | OUTPATIENT
Start: 2024-06-26

## 2024-07-12 ENCOUNTER — OFFICE VISIT (OUTPATIENT)
Dept: PODIATRY | Facility: CLINIC | Age: 64
End: 2024-07-12
Payer: COMMERCIAL

## 2024-07-12 DIAGNOSIS — G62.9 PERIPHERAL NEURITIS: ICD-10-CM

## 2024-07-12 DIAGNOSIS — M25.871 SESAMOIDITIS OF RIGHT FOOT: Primary | ICD-10-CM

## 2024-07-12 DIAGNOSIS — M25.872 SESAMOIDITIS OF LEFT FOOT: ICD-10-CM

## 2024-07-12 PROCEDURE — 99203 OFFICE O/P NEW LOW 30 MIN: CPT | Performed by: PODIATRIST

## 2024-07-12 NOTE — NURSING NOTE
Gisela Pak's chief complaint for this visit includes:  Chief Complaint   Patient presents with    Consult     Foot pain, bilateral. Left is worse.     PCP: Mel Beavers    Referring Provider:  Referred Self, MD  No address on file    [facility] 09/14/2011   Data Unavailable     Do you need any medication refills at today's visit? NO    Allergies   Allergen Reactions    Lexapro [Escitalopram] Palpitations and Nausea       Di Hi LPN
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
## 2024-08-19 ENCOUNTER — MYC MEDICAL ADVICE (OUTPATIENT)
Dept: FAMILY MEDICINE | Facility: CLINIC | Age: 64
End: 2024-08-19
Payer: COMMERCIAL

## 2024-09-03 NOTE — PROGRESS NOTES
[facility]    Neurology Follow-Up    9/4/2024      Gisela Pak MRN# [id number]   YOB: 1960 Age: 64 year old      Primary care provider:   Mel Beavers      Follow-up 6-1-22, 7-29-22, 1-13-23, 12-22-23:  Gisela Pak is a 64 year old female with a past medical history of severe head pressure worse being reclined, myofascial pain, and imbalance with veering to the left. The balance issues have been present since Feb 2021. Headaches have been present since her 20's. She had a normal exam on 6-1-22 except for tenderness over the anterior scalenes and sternoclavicular junction, worse on the right side.      Head pressure that wakes her up in the morning and is better with being upright was worrisome for raised intracranial pressure. Given that she has right sided predominant thoracic outlet tenderness, we worked her up for venous compression in the neck and upper shoulder. Non-contrast brain MRI was previously normal.      Interim History 7-29-22:  Ultrasound 6-9-22:  1. RIGHT Subclavian: A. 8.79 velocity ratio from the lateral to mid subclavian vein suggests stenosis.     CTV Head and Neck 6-23-22:  In neutral position, the upper internal jugular veins demonstrate mild narrowing narrowing bilaterally. With neck flexion, there is moderate narrowing of the upper left internal jugular vein. Near complete effacement of the right internal jugular vein in flexion between the digastric and C2 transverse process.     Patient was better in April and May but symptoms have been worse in the last couple of months. Symptoms are worse with weather changes, mostly related with humidity and barometric pressures. She was just in [place] and she was almost asymptomatic. The weather was very even. Going up elevation to 3000 feet, she became nauseated but it didn't make the headache worse. The weather changes in general can cause head pressure. She does not have a problem  "with flying. Symptoms can be triggered by forward head flexion.      Pathophysiology reviewed with both internal jugular veins narrowed with forward head flexion and some evidence of right sided subclavian vein narrowing. We discussed the need for ergonomic awareness and the role of physical therapy.      Plan:  1. 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 I've instructed her on how to look for a provider in her area.   2. Physical therapy for the subclavian vein compression. Therapy requested.   3. Follow-up 4 months     INTERIM HISTORY 1/13/2023:  8-31-22 One session of PT:   1.  Hypertonicity bilateral scalenes  2.  Decreased cervical spine ROM     [facility] chiropractic, through [facility], Dr. Fletcher since late October. This has been beneficial for having less shoulder tightness, does not need daily shoulder massages. She had had a 5-week break and the shoulder tightness return.     Headaches are uncommon, not affected by the [facility].   Her balance problems are not changed.  She finds that the weather changes make her more imbalanced. The trigger seems to be changing barometric pressure and humidity. She was perfectly fine when she was in [place].      Plan:  1. Should try PT, will refer to area close to her home  2. Care for neck protection especially with travel.   3. Follow-up 4-6 months     Interim History 6/16/2023:  She has stopped [facility] after 6-months dues to inefficacy  She saw a PT 3x and was given some balance exercises, incorporated into her everyday routine. Balance is now better but not cured. She does balance exercises, like balancing on one foot with movement. She still has difficulty with closing her eyes while doing this.      She still had head pressure with the exercises, still worse with bending head forward. She will have a wave of feeling motion, like a \"whoosh.\" She can be bothered by this feeling of motion all day, " worse at the end of the day. She feels wobbly and unsteady with walking, not noticeable to others.      She does have a benign tremor in her right hand some in the left. She also has trouble falling asleep. When she is driving a car, holding onto her a steering wheel makes her feel better. She can ride her bike and feel better.     After discussing about half a dozen possibilities to treat residual symptoms while potentially having some benefit for her other problems, we landed on three choices in three medication classes:     1. Topiramate 25mg: May be a good for tremor and insomnia (taken at night to start then twice a day)  2. Nortriptyline 10mg: May be a good choice for anxiety and insomnia (taken at night)  3. Propranolol 60mg ER: May be a good choice for anxiety and tremor (taken at night)     Plan:   1. Continue with daily stretching and balance exercises  2. Patient to think about pros and cons of the medication choices and let me know what she decides  3. Follow-up 6-months     Interim History 12/22/2023:  On 11-30-23 she started propranolol ER 60mg.   She has had some lower resting heart rate down to the low 50's. She has not had more fatigue. She has noticed some insomnia, but not clearly medicine related. The tremor has not changed. The anxiety has not changed.      She is about the same symptom-wise with the head pressure.   She still has had head pressure, but it's a bit better over time, especially at the skull base.      Doing step aerobics can make her dizzy. She can bike, lift weights, and do the elliptical. No falls. The internal motion is present throughout the day. She has been doing online vestibular therapy through prerecorded instructions since last week.      PLAN:  Stay on propranolol 60mg ER for a longer observation period  Check in at the end of January about the status of dizziness, anxiety, and tremor at which point we'll consider raising the dose.  Mindfulness of posture, especially  "head flexion  Follow-up 6-months    Interim History 9/4/2024:  She restarted propranolol in 1-2024 but she did not feel good on it so she stopped. It reduced appetite and increased bowel movements. She would be interested in retrying the propranolol because there was a confounding shoulder myofacial pain from lifting something.      Note from patient 6-11-24:  \"I am experiencing painful migraines again. Only two so far, but they are a week apart and cause vomiting, low body temperature and neck pain. I also have low grade headaches in between the big headaches. Over the counter pain meds do not address the pain. Is there some pain med that addresses migraines? Something I can take \"as needed''? Please advise.\"    The headache lasted about 2 days. She had not had a headache that severe for 2022. I had sent prescriptions for a medrol dose rick and rizatriptan but they were not needed because by day #3 she was better. In the last week, she had two minor headaches.     She has had worsening hand tremors in the last two months, R>L, triggered by using the hand, affecting handwriting somewhat but mostly with drinking and eating. She sometimes has to brace herself to use the hand. She does not drink ETOH enough to know its effect.      She has developed burning in both feet, bilateral, L>R. There is some numbness on the toes and the lateral aspects of both feet.      ALLERGIES:     Allergies   Allergen Reactions    Lexapro [Escitalopram] Palpitations and Nausea        MEDICATIONS:    Current Outpatient Medications:     clonazePAM (KLONOPIN) 0.5 MG tablet, Take 0.5-1 tablets (0.25-0.5 mg) by mouth nightly as needed for anxiety, Disp: 30 tablet, Rfl: 0    fluticasone (FLONASE) 50 MCG/ACT nasal spray, APPLY ONE SPRAY IN EACH NOSTRIL ONCE DAILY, Disp: 16 g, Rfl: 4    magnesium 250 MG tablet, Take 1 tablet by mouth daily, Disp: , Rfl:     methylPREDNISolone (MEDROL DOSEPAK) 4 MG tablet therapy pack, Take all of each day's " tablets as one single dose in the morning., Disp: 21 tablet, Rfl: 0    ondansetron (ZOFRAN) 4 MG tablet, Take one tablet every six hours for nausea during colonoscopy bowel prepping, Disp: 3 tablet, Rfl: 0    potassium aminobenzoate 500 MG CAPS capsule, , Disp: , Rfl:     rizatriptan (MAXALT-MLT) 10 MG ODT, Take 1 tablet (10 mg) by mouth at onset of headache for migraine May repeat in 2 hours. Max 3 tablets/24 hours., Disp: 8 tablet, Rfl: 3    tretinoin (RETIN-A) 0.05 % external cream, APPLY PEA SIZE AMOUNT TO FACE AT BEDTIME, Disp: , Rfl:     Vitamin D3 (CHOLECALCIFEROL) 25 mcg (1000 units) tablet, Take by mouth daily, Disp: , Rfl:      PHYSICAL EXAM:  Vitals:  /84   Pulse 84   Resp 16   LMP 09/14/2011   SpO2 99%     General: Patient is well-nourished, well-groomed, in no apparent distress    HEENT: Head is atraumatic, eyes look normal exteriorly, throat clear, neck supple.  Ext: Warm, well-perfused. No edema.    Neurologic:  Mental Status: Alert and oriented to person, place, time, and situation.  Able to provide an excellent history.     Cranial Nerves: Visual fields full to confrontation. Pupils equal and reactive to light.  Extraocular movements full.  Face sensation normal.  Normal head impulse testing.  Normal hearing to finger rub. Face symmetric with normal movements. Tongue and palate midline.  Normal shoulder elevation.      Motor: Normal bulk and tone.  No pronator drift.  Normal foot taps.  Full strength to confrontational testing.    Sensory: Reduced sensation to pinprick in the left foot on lateral aspect of her bilateral feet more dense on the right side. This looks like the lateral plantar nerve (tibial branch) distribution. There is some mild tenderness L>R over the peroneal nerve at the knee, however.     Reflexes: Biceps, Brachioradialis, Triceps, Knees, Ankles 2/4.     Coordination: Normal finger to nose, rapid alternating movements but has a ~7Hz postural tremor, much reduced with  rest. Archimedes spiral shows tremor. No change with distraction. Does not entrain.     Gait: Normal stance width.  Negative Romberg.  Good gait initiation.  Good stride length.  Good arm swing.  Normal turn. Able to walk 5 steps in tandem.       PLAN:  Check TSH for worsening tremor, elevated blood pressure  Restart propranolol 60mg ER--reviewed side effects. Could help tremor, migraine, blood pressure. Side effects (fatigue, low exercise tolerance, depression reviewed)  Discussed role of checking neuropathy labs and doing a nerve conduction study but might have low yield at this point since symptoms are only sensory and seem to follow a peripheral branch nerve distribution. Will watch.   Patient has rizatriptan and Medrol dose rick at home for breakthrough migraines    The longitudinal plan of care for the condition(s) below were addressed during this visit. Due to the added complexity in care, I will continue to support Gisela in the subsequent management of this condition(s) and with the ongoing continuity of care of this condition(s).    37-minutes spent in evaluation, examination, and documentation

## 2024-09-04 ENCOUNTER — OFFICE VISIT (OUTPATIENT)
Dept: NEUROLOGY | Facility: CLINIC | Age: 64
End: 2024-09-04
Payer: COMMERCIAL

## 2024-09-04 VITALS
RESPIRATION RATE: 16 BRPM | OXYGEN SATURATION: 99 % | SYSTOLIC BLOOD PRESSURE: 136 MMHG | DIASTOLIC BLOOD PRESSURE: 84 MMHG | HEART RATE: 84 BPM

## 2024-09-04 DIAGNOSIS — R42 DYSEQUILIBRIUM: ICD-10-CM

## 2024-09-04 DIAGNOSIS — G57.61 LATERAL PLANTAR NEUROPATHY, RIGHT: ICD-10-CM

## 2024-09-04 DIAGNOSIS — R25.1 TREMOR: Primary | ICD-10-CM

## 2024-09-04 DIAGNOSIS — G43.009 MIGRAINE WITHOUT AURA AND WITHOUT STATUS MIGRAINOSUS, NOT INTRACTABLE: ICD-10-CM

## 2024-09-04 DIAGNOSIS — G57.62: ICD-10-CM

## 2024-09-04 PROCEDURE — 99214 OFFICE O/P EST MOD 30 MIN: CPT | Performed by: PSYCHIATRY & NEUROLOGY

## 2024-09-04 PROCEDURE — G2211 COMPLEX E/M VISIT ADD ON: HCPCS | Performed by: PSYCHIATRY & NEUROLOGY

## 2024-09-04 RX ORDER — PROPRANOLOL HCL 60 MG
60 CAPSULE, EXTENDED RELEASE 24HR ORAL AT BEDTIME
Qty: 30 CAPSULE | Refills: 3 | Status: SHIPPED | OUTPATIENT
Start: 2024-09-04

## 2024-09-04 ASSESSMENT — ANXIETY QUESTIONNAIRES
GAD7 TOTAL SCORE: 4
5. BEING SO RESTLESS THAT IT IS HARD TO SIT STILL: SEVERAL DAYS
4. TROUBLE RELAXING: SEVERAL DAYS
GAD7 TOTAL SCORE: 4
2. NOT BEING ABLE TO STOP OR CONTROL WORRYING: NOT AT ALL
7. FEELING AFRAID AS IF SOMETHING AWFUL MIGHT HAPPEN: NOT AT ALL
6. BECOMING EASILY ANNOYED OR IRRITABLE: SEVERAL DAYS
3. WORRYING TOO MUCH ABOUT DIFFERENT THINGS: NOT AT ALL
1. FEELING NERVOUS, ANXIOUS, OR ON EDGE: SEVERAL DAYS

## 2024-09-04 ASSESSMENT — PAIN SCALES - GENERAL: PAINLEVEL: MILD PAIN (3)

## 2024-09-04 NOTE — LETTER
9/4/2024       RE: Gisela Pak  [address]  [facility] [address]     Dear Colleague,    Thank you for referring your patient, Gisela Pak, to the [facility] [place] at [facility]. Please see a copy of my visit note below.    [facility]    Neurology Follow-Up    9/4/2024      Gisela Pak MRN# [id number]   YOB: 1960 Age: 64 year old      Primary care provider:   Mel Beavers      Follow-up 6-1-22, 7-29-22, 1-13-23, 12-22-23:  Gisela Pak is a 64 year old female with a past medical history of severe head pressure worse being reclined, myofascial pain, and imbalance with veering to the left. The balance issues have been present since Feb 2021. Headaches have been present since her 20's. She had a normal exam on 6-1-22 except for tenderness over the anterior scalenes and sternoclavicular junction, worse on the right side.      Head pressure that wakes her up in the morning and is better with being upright was worrisome for raised intracranial pressure. Given that she has right sided predominant thoracic outlet tenderness, we worked her up for venous compression in the neck and upper shoulder. Non-contrast brain MRI was previously normal.      Interim History 7-29-22:  Ultrasound 6-9-22:  1. RIGHT Subclavian: A. 8.79 velocity ratio from the lateral to mid subclavian vein suggests stenosis.     CTV Head and Neck 6-23-22:  In neutral position, the upper internal jugular veins demonstrate mild narrowing narrowing bilaterally. With neck flexion, there is moderate narrowing of the upper left internal jugular vein. Near complete effacement of the right internal jugular vein in flexion between the digastric and C2 transverse process.     Patient was better in April and May but symptoms have been worse in the last couple of months. Symptoms are worse with weather  changes, mostly related with humidity and barometric pressures. She was just in [place] and she was almost asymptomatic. The weather was very even. Going up elevation to 3000 feet, she became nauseated but it didn't make the headache worse. The weather changes in general can cause head pressure. She does not have a problem with flying. Symptoms can be triggered by forward head flexion.      Pathophysiology reviewed with both internal jugular veins narrowed with forward head flexion and some evidence of right sided subclavian vein narrowing. We discussed the need for ergonomic awareness and the role of physical therapy.      Plan:  1. 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 I've instructed her on how to look for a provider in her area.   2. Physical therapy for the subclavian vein compression. Therapy requested.   3. Follow-up 4 months     INTERIM HISTORY 1/13/2023:  8-31-22 One session of PT:   1.  Hypertonicity bilateral scalenes  2.  Decreased cervical spine ROM     [facility] chiropractic, through [facility], Dr. Fletcher since late October. This has been beneficial for having less shoulder tightness, does not need daily shoulder massages. She had had a 5-week break and the shoulder tightness return.     Headaches are uncommon, not affected by the [facility].   Her balance problems are not changed.  She finds that the weather changes make her more imbalanced. The trigger seems to be changing barometric pressure and humidity. She was perfectly fine when she was in [place].      Plan:  1. Should try PT, will refer to area close to her home  2. Care for neck protection especially with travel.   3. Follow-up 4-6 months     Interim History 6/16/2023:  She has stopped [facility] after 6-months dues to inefficacy  She saw a PT 3x and was given some balance exercises, incorporated into her everyday routine. Balance is now better but not cured. She does balance  "exercises, like balancing on one foot with movement. She still has difficulty with closing her eyes while doing this.      She still had head pressure with the exercises, still worse with bending head forward. She will have a wave of feeling motion, like a \"whoosh.\" She can be bothered by this feeling of motion all day, worse at the end of the day. She feels wobbly and unsteady with walking, not noticeable to others.      She does have a benign tremor in her right hand some in the left. She also has trouble falling asleep. When she is driving a car, holding onto her a steering wheel makes her feel better. She can ride her bike and feel better.     After discussing about half a dozen possibilities to treat residual symptoms while potentially having some benefit for her other problems, we landed on three choices in three medication classes:     1. Topiramate 25mg: May be a good for tremor and insomnia (taken at night to start then twice a day)  2. Nortriptyline 10mg: May be a good choice for anxiety and insomnia (taken at night)  3. Propranolol 60mg ER: May be a good choice for anxiety and tremor (taken at night)     Plan:   1. Continue with daily stretching and balance exercises  2. Patient to think about pros and cons of the medication choices and let me know what she decides  3. Follow-up 6-months     Interim History 12/22/2023:  On 11-30-23 she started propranolol ER 60mg.   She has had some lower resting heart rate down to the low 50's. She has not had more fatigue. She has noticed some insomnia, but not clearly medicine related. The tremor has not changed. The anxiety has not changed.      She is about the same symptom-wise with the head pressure.   She still has had head pressure, but it's a bit better over time, especially at the skull base.      Doing step aerobics can make her dizzy. She can bike, lift weights, and do the elliptical. No falls. The internal motion is present throughout the day. She has been " "doing online vestibular therapy through prerecorded instructions since last week.      PLAN:  Stay on propranolol 60mg ER for a longer observation period  Check in at the end of January about the status of dizziness, anxiety, and tremor at which point we'll consider raising the dose.  Mindfulness of posture, especially head flexion  Follow-up 6-months    Interim History 9/4/2024:  She restarted propranolol in 1-2024 but she did not feel good on it so she stopped. It reduced appetite and increased bowel movements. She would be interested in retrying the propranolol because there was a confounding shoulder myofacial pain from lifting something.      Note from patient 6-11-24:  \"I am experiencing painful migraines again. Only two so far, but they are a week apart and cause vomiting, low body temperature and neck pain. I also have low grade headaches in between the big headaches. Over the counter pain meds do not address the pain. Is there some pain med that addresses migraines? Something I can take \"as needed''? Please advise.\"    The headache lasted about 2 days. She had not had a headache that severe for 2022. I had sent prescriptions for a medrol dose rick and rizatriptan but they were not needed because by day #3 she was better. In the last week, she had two minor headaches.     She has had worsening hand tremors in the last two months, R>L, triggered by using the hand, affecting handwriting somewhat but mostly with drinking and eating. She sometimes has to brace herself to use the hand. She does not drink ETOH enough to know its effect.      She has developed burning in both feet, bilateral, L>R. There is some numbness on the toes and the lateral aspects of both feet.      ALLERGIES:     Allergies   Allergen Reactions     Lexapro [Escitalopram] Palpitations and Nausea        MEDICATIONS:    Current Outpatient Medications:      clonazePAM (KLONOPIN) 0.5 MG tablet, Take 0.5-1 tablets (0.25-0.5 mg) by mouth nightly as " needed for anxiety, Disp: 30 tablet, Rfl: 0     fluticasone (FLONASE) 50 MCG/ACT nasal spray, APPLY ONE SPRAY IN EACH NOSTRIL ONCE DAILY, Disp: 16 g, Rfl: 4     magnesium 250 MG tablet, Take 1 tablet by mouth daily, Disp: , Rfl:      methylPREDNISolone (MEDROL DOSEPAK) 4 MG tablet therapy pack, Take all of each day's tablets as one single dose in the morning., Disp: 21 tablet, Rfl: 0     ondansetron (ZOFRAN) 4 MG tablet, Take one tablet every six hours for nausea during colonoscopy bowel prepping, Disp: 3 tablet, Rfl: 0     potassium aminobenzoate 500 MG CAPS capsule, , Disp: , Rfl:      rizatriptan (MAXALT-MLT) 10 MG ODT, Take 1 tablet (10 mg) by mouth at onset of headache for migraine May repeat in 2 hours. Max 3 tablets/24 hours., Disp: 8 tablet, Rfl: 3     tretinoin (RETIN-A) 0.05 % external cream, APPLY PEA SIZE AMOUNT TO FACE AT BEDTIME, Disp: , Rfl:      Vitamin D3 (CHOLECALCIFEROL) 25 mcg (1000 units) tablet, Take by mouth daily, Disp: , Rfl:      PHYSICAL EXAM:  Vitals:  /84   Pulse 84   Resp 16   LMP 09/14/2011   SpO2 99%     General: Patient is well-nourished, well-groomed, in no apparent distress    HEENT: Head is atraumatic, eyes look normal exteriorly, throat clear, neck supple.  Ext: Warm, well-perfused. No edema.    Neurologic:  Mental Status: Alert and oriented to person, place, time, and situation.  Able to provide an excellent history.     Cranial Nerves: Visual fields full to confrontation. Pupils equal and reactive to light.  Extraocular movements full.  Face sensation normal.  Normal head impulse testing.  Normal hearing to finger rub. Face symmetric with normal movements. Tongue and palate midline.  Normal shoulder elevation.      Motor: Normal bulk and tone.  No pronator drift.  Normal foot taps.  Full strength to confrontational testing.    Sensory: Reduced sensation to pinprick in the left foot on lateral aspect of her bilateral feet more dense on the right side. This looks like  the lateral plantar nerve (tibial branch) distribution. There is some mild tenderness L>R over the peroneal nerve at the knee, however.     Reflexes: Biceps, Brachioradialis, Triceps, Knees, Ankles 2/4.     Coordination: Normal finger to nose, rapid alternating movements but has a ~7Hz postural tremor, much reduced with rest. Archimedes spiral shows tremor. No change with distraction. Does not entrain.     Gait: Normal stance width.  Negative Romberg.  Good gait initiation.  Good stride length.  Good arm swing.  Normal turn. Able to walk 5 steps in tandem.       PLAN:  Check TSH for worsening tremor, elevated blood pressure  Restart propranolol 60mg ER--reviewed side effects. Could help tremor, migraine, blood pressure. Side effects (fatigue, low exercise tolerance, depression reviewed)  Discussed role of checking neuropathy labs and doing a nerve conduction study but might have low yield at this point since symptoms are only sensory and seem to follow a peripheral branch nerve distribution. Will watch.   Patient has rizatriptan and Medrol dose rick at home for breakthrough migraines    The longitudinal plan of care for the condition(s) below were addressed during this visit. Due to the added complexity in care, I will continue to support Gisela in the subsequent management of this condition(s) and with the ongoing continuity of care of this condition(s).    37-minutes spent in evaluation, examination, and documentation          Again, thank you for allowing me to participate in the care of your patient.      Sincerely,    Alana MARIN Cha, MD

## 2024-09-10 ENCOUNTER — LAB (OUTPATIENT)
Dept: LAB | Facility: CLINIC | Age: 64
End: 2024-09-10
Payer: COMMERCIAL

## 2024-09-10 DIAGNOSIS — R25.1 TREMOR: ICD-10-CM

## 2024-09-10 LAB — TSH SERPL DL<=0.005 MIU/L-ACNC: 1.04 UIU/ML (ref 0.3–4.2)

## 2024-09-10 PROCEDURE — 36415 COLL VENOUS BLD VENIPUNCTURE: CPT

## 2024-09-10 PROCEDURE — 84443 ASSAY THYROID STIM HORMONE: CPT

## 2024-09-16 ENCOUNTER — TELEPHONE (OUTPATIENT)
Dept: NEUROLOGY | Facility: CLINIC | Age: 64
End: 2024-09-16
Payer: COMMERCIAL

## 2024-09-16 NOTE — TELEPHONE ENCOUNTER
Left Voicemail (1st Attempt) and Sent Mychart (1st Attempt) for the patient to call back and schedule the following:    Appointment type: 6 month follow up (virtual)  Provider: Dr. Barajas  Return date: March 2025  Specialty phone number: [phone]  Additional appointment(s) needed:   Additonal Notes:

## 2024-09-18 NOTE — TELEPHONE ENCOUNTER
Patient confirmed scheduled appointment:  Date: 3/12/2025  Time: 1:00pm  Visit type: Return Neurology  Provider: Jenn  Location: [facility]  Testing/imaging: NA  Additional notes: 6 month follow up    Fallon Phelan on 9/18/2024 at 3:50 PM

## 2024-10-18 ENCOUNTER — IMMUNIZATION (OUTPATIENT)
Dept: FAMILY MEDICINE | Facility: CLINIC | Age: 64
End: 2024-10-18
Payer: COMMERCIAL

## 2024-10-18 PROCEDURE — 90656 IIV3 VACC NO PRSV 0.5 ML IM: CPT

## 2024-10-18 PROCEDURE — 90471 IMMUNIZATION ADMIN: CPT

## 2024-12-03 SDOH — HEALTH STABILITY: PHYSICAL HEALTH: ON AVERAGE, HOW MANY DAYS PER WEEK DO YOU ENGAGE IN MODERATE TO STRENUOUS EXERCISE (LIKE A BRISK WALK)?: 7 DAYS

## 2024-12-03 SDOH — HEALTH STABILITY: PHYSICAL HEALTH: ON AVERAGE, HOW MANY MINUTES DO YOU ENGAGE IN EXERCISE AT THIS LEVEL?: 40 MIN

## 2024-12-03 ASSESSMENT — ANXIETY QUESTIONNAIRES
8. IF YOU CHECKED OFF ANY PROBLEMS, HOW DIFFICULT HAVE THESE MADE IT FOR YOU TO DO YOUR WORK, TAKE CARE OF THINGS AT HOME, OR GET ALONG WITH OTHER PEOPLE?: NOT DIFFICULT AT ALL
7. FEELING AFRAID AS IF SOMETHING AWFUL MIGHT HAPPEN: NOT AT ALL
2. NOT BEING ABLE TO STOP OR CONTROL WORRYING: NOT AT ALL
GAD7 TOTAL SCORE: 0
IF YOU CHECKED OFF ANY PROBLEMS ON THIS QUESTIONNAIRE, HOW DIFFICULT HAVE THESE PROBLEMS MADE IT FOR YOU TO DO YOUR WORK, TAKE CARE OF THINGS AT HOME, OR GET ALONG WITH OTHER PEOPLE: NOT DIFFICULT AT ALL
3. WORRYING TOO MUCH ABOUT DIFFERENT THINGS: NOT AT ALL
6. BECOMING EASILY ANNOYED OR IRRITABLE: NOT AT ALL
GAD7 TOTAL SCORE: 0
1. FEELING NERVOUS, ANXIOUS, OR ON EDGE: NOT AT ALL
4. TROUBLE RELAXING: NOT AT ALL
7. FEELING AFRAID AS IF SOMETHING AWFUL MIGHT HAPPEN: NOT AT ALL
GAD7 TOTAL SCORE: 0
5. BEING SO RESTLESS THAT IT IS HARD TO SIT STILL: NOT AT ALL

## 2024-12-03 ASSESSMENT — SOCIAL DETERMINANTS OF HEALTH (SDOH): HOW OFTEN DO YOU GET TOGETHER WITH FRIENDS OR RELATIVES?: ONCE A WEEK

## 2024-12-05 ENCOUNTER — OFFICE VISIT (OUTPATIENT)
Dept: FAMILY MEDICINE | Facility: CLINIC | Age: 64
End: 2024-12-05
Attending: FAMILY MEDICINE
Payer: COMMERCIAL

## 2024-12-05 VITALS
HEIGHT: 63 IN | HEART RATE: 61 BPM | BODY MASS INDEX: 31.89 KG/M2 | OXYGEN SATURATION: 98 % | WEIGHT: 180 LBS | DIASTOLIC BLOOD PRESSURE: 79 MMHG | SYSTOLIC BLOOD PRESSURE: 118 MMHG | TEMPERATURE: 97.7 F | RESPIRATION RATE: 16 BRPM

## 2024-12-05 DIAGNOSIS — Z12.31 VISIT FOR SCREENING MAMMOGRAM: ICD-10-CM

## 2024-12-05 DIAGNOSIS — Z23 NEED FOR VACCINATION: ICD-10-CM

## 2024-12-05 DIAGNOSIS — Z00.00 ROUTINE GENERAL MEDICAL EXAMINATION AT A HEALTH CARE FACILITY: Primary | ICD-10-CM

## 2024-12-05 DIAGNOSIS — H91.93 BILATERAL HEARING LOSS, UNSPECIFIED HEARING LOSS TYPE: ICD-10-CM

## 2024-12-05 DIAGNOSIS — F41.1 GENERALIZED ANXIETY DISORDER: ICD-10-CM

## 2024-12-05 DIAGNOSIS — R42 DYSEQUILIBRIUM: ICD-10-CM

## 2024-12-05 RX ORDER — CLONAZEPAM 0.5 MG/1
0.25-0.5 TABLET ORAL
Qty: 30 TABLET | Refills: 0 | Status: SHIPPED | OUTPATIENT
Start: 2024-12-05

## 2024-12-05 ASSESSMENT — PAIN SCALES - GENERAL: PAINLEVEL_OUTOF10: NO PAIN (0)

## 2024-12-05 NOTE — PATIENT INSTRUCTIONS
Advanced colorectal polyps include:   Adenomas >=1 cm, or any adenoma with villous features or with high-grade dysplasia   Sessile serrated polyps >=1 cm, or any serrated lesion with any grade of cytologic dysplasia   Traditional serrated adenomas, regardless of size    If you've been diagnosed with an advanced colorectal polyp:   Your first degree relatives should start colorectal cancer screening at age 40, or 10 years before the youngest affected relative, whichever is earlier.    Screening modalities of choice:  If the advanced polyp was diagnosed at <60 years of age:   Colonoscopy is the recommended screening test for all first-degree relatives.   Begin at age 40, or 10 years before the youngest affected relative, whichever is earlier.   Intervals of every 5 years    If the advanced polyp was found at >=60 years of age:   Average-risk screening tests and surveillance intervals are recommended, but screening should begin at age 40.      Patient Education   Preventive Care Advice   This is general advice given by our system to help you stay healthy. However, your care team may have specific advice just for you. Please talk to your care team about your preventive care needs.  Nutrition  Eat 5 or more servings of fruits and vegetables each day.  Try wheat bread, brown rice and whole grain pasta (instead of white bread, rice, and pasta).  Get enough calcium and vitamin D. Check the label on foods and aim for 100% of the RDA (recommended daily allowance).  Lifestyle  Exercise at least 150 minutes each week  (30 minutes a day, 5 days a week).  Do muscle strengthening activities 2 days a week. These help control your weight and prevent disease.  No smoking.  Wear sunscreen to prevent skin cancer.  Have a dental exam and cleaning every 6 months.  Yearly exams  See your health care team every year to talk about:  Any changes in your health.  Any medicines your care team has prescribed.  Preventive care, family planning,  and ways to prevent chronic diseases.  Shots (vaccines)   HPV shots (up to age 26), if you've never had them before.  Hepatitis B shots (up to age 59), if you've never had them before.  COVID-19 shot: Get this shot when it's due.  Flu shot: Get a flu shot every year.  Tetanus shot: Get a tetanus shot every 10 years.  Pneumococcal, hepatitis A, and RSV shots: Ask your care team if you need these based on your risk.  Shingles shot (for age 50 and up)  General health tests  Diabetes screening:  Starting at age 35, Get screened for diabetes at least every 3 years.  If you are younger than age 35, ask your care team if you should be screened for diabetes.  Cholesterol test: At age 39, start having a cholesterol test every 5 years, or more often if advised.  Bone density scan (DEXA): At age 50, ask your care team if you should have this scan for osteoporosis (brittle bones).  Hepatitis C: Get tested at least once in your life.  STIs (sexually transmitted infections)  Before age 24: Ask your care team if you should be screened for STIs.  After age 24: Get screened for STIs if you're at risk. You are at risk for STIs (including HIV) if:  You are sexually active with more than one person.  You don't use condoms every time.  You or a partner was diagnosed with a sexually transmitted infection.  If you are at risk for HIV, ask about PrEP medicine to prevent HIV.  Get tested for HIV at least once in your life, whether you are at risk for HIV or not.  Cancer screening tests  Cervical cancer screening: If you have a cervix, begin getting regular cervical cancer screening tests starting at age 21.  Breast cancer scan (mammogram): If you've ever had breasts, begin having regular mammograms starting at age 40. This is a scan to check for breast cancer.  Colon cancer screening: It is important to start screening for colon cancer at age 45.  Have a colonoscopy test every 10 years (or more often if you're at risk) Or, ask your provider  about stool tests like a FIT test every year or Cologuard test every 3 years.  To learn more about your testing options, visit:   .  For help making a decision, visit:   https://bit.ly/fg12739.  Prostate cancer screening test: If you have a prostate, ask your care team if a prostate cancer screening test (PSA) at age 55 is right for you.  Lung cancer screening: If you are a current or former smoker ages 50 to 80, ask your care team if ongoing lung cancer screenings are right for you.  For informational purposes only. Not to replace the advice of your health care provider. Copyright   2023 [employer]. All rights reserved. Clinically reviewed by the [facility]. [employer] 791909 - REV 01/24.  Preventing Falls: Care Instructions  Injuries and health problems such as trouble walking or poor eyesight can increase your risk of falling. So can some medicines. But there are things you can do to help prevent falls. You can exercise to get stronger. You can also arrange your home to make it safer.    Talk to your doctor about the medicines you take. Ask if any of them increase the risk of falls and whether they can be changed or stopped.   Try to exercise regularly. It can help improve your strength and balance. This can help lower your risk of falling.         Practice fall safety and prevention.   Wear low-heeled shoes that fit well and give your feet good support. Talk to your doctor if you have foot problems that make this hard.  Carry a cellphone or wear a medical alert device that you can use to call for help.  Use stepladders instead of chairs to reach high objects. Don't climb if you're at risk for falls. Ask for help, if needed.  Wear the correct eyeglasses, if you need them.        Make your home safer.   Remove rugs, cords, clutter, and furniture from walkways.  Keep your house well lit. Use night-lights in hallways and bathrooms.  Install and use sturdy handrails on  "stairways.  Wear nonskid footwear, even inside. Don't walk barefoot or in socks without shoes.        Be safe outside.   Use handrails, curb cuts, and ramps whenever possible.  Keep your hands free by using a shoulder bag or backpack.  Try to walk in well-lit areas. Watch out for uneven ground, changes in pavement, and debris.  Be careful in the winter. Walk on the grass or gravel when sidewalks are slippery. Use de-icer on steps and walkways. Add non-slip devices to shoes.    Put grab bars and nonskid mats in your shower or tub and near the toilet. Try to use a shower chair or bath bench when bathing.   Get into a tub or shower by putting in your weaker leg first. Get out with your strong side first. Have a phone or medical alert device in the bathroom with you.   Where can you learn more?  Go to https://www.[employer].net/patiented  Enter G117 in the search box to learn more about \"Preventing Falls: Care Instructions.\"  Current as of: July 17, 2023  Content Version: 14.2 2024 [facility].   Care instructions adapted under license by your healthcare professional. If you have questions about a medical condition or this instruction, always ask your healthcare professional. [employer] disclaims any warranty or liability for your use of this information.       "

## 2024-12-05 NOTE — PROGRESS NOTES
"Preventive Care Visit  [facility]  Mel Beavers MD, Family Medicine  Dec 5, 2024      Assessment & Plan     Routine general medical examination at a health care facility  Reviewed/updated Health Maintenance     Visit for screening mammogram  - MA Screening Bilateral w/ Juan    Generalized anxiety disorder  She uses this sparingly - last received #30 a year ago.  I checked the [place] Prescription Monitoring Program website which showed no concerning activity.     - clonazePAM (KLONOPIN) 0.5 MG tablet  Dispense: 30 tablet; Refill: 0    Bilateral hearing loss, unspecified hearing loss type  Dysequilibrium  Referred back to [facility]  - Adult Audiology  Referral  - Adult ENT  Referral    Need for vaccination  - COVID-19 12+ (PFIZER)      BMI  Estimated body mass index is 31.5 kg/m  as calculated from the following:    Height as of this encounter: 1.61 m (5' 3.39\").    Weight as of this encounter: 81.6 kg (180 lb).   Weight management plan: Discussed healthy diet and exercise guidelines       Counseling  Appropriate preventive services were addressed with this patient via screening, questionnaire, or discussion as appropriate for fall prevention, nutrition, physical activity, Tobacco-use cessation, social engagement, weight loss and cognition.  Checklist reviewing preventive services available has been given to the patient.  Reviewed patient's diet, addressing concerns and/or questions.       See Patient Instructions    Tania Higgins is a 64 year old, presenting for the following:  Physical (Physical - Going over meds )        12/5/2024     1:50 PM   Additional Questions   Roomed by Mary Huerta        HEARING FREQUENCY    Right Ear:      1000 Hz RESPONSE- on Level: 40 db (Conditioning sound)   1000 Hz: RESPONSE- on Level:   20 db    2000 Hz: RESPONSE- on Level: tone not heard   4000 Hz: RESPONSE- on Level: tone not " heard    Left Ear:      4000 Hz: RESPONSE- on Level: tone not heard   2000 Hz: RESPONSE- on Level: tone not heard   1000 Hz: RESPONSE- on Level:   20 db     500 Hz: RESPONSE- on Level: tone not heard    Right Ear:    500 Hz: RESPONSE- on Level: tone not heard    Hearing Acuity: REFER    Hearing Assessment: abnormal--refer to audiology     HPI    She continues to have dizziness which she describes as a sense of disequilibrium.  Neuro feels it could be vestibular migraines and has started her on propranolol (also for Benign Essential Tremor).    She feels the propranolol helps with the tremor but rarely gets migraine headaches.  Dizziness is closely related to weather and changes in barometric pressure  She'd like to go back to Dr. Vieyra/ENT.      Health Care Directive  Patient does not have a Health Care Directive: Patient states has Advance Directive and will bring in a copy to clinic.      12/3/2024   General Health   How would you rate your overall physical health? (!) FAIR   Feel stress (tense, anxious, or unable to sleep) Not at all            12/3/2024   Nutrition   Three or more servings of calcium each day? (!) NO   Diet: Regular (no restrictions)   How many servings of fruit and vegetables per day? (!) 2-3   How many sweetened beverages each day? 0-1            12/3/2024   Exercise   Days per week of moderate/strenous exercise 7 days   Average minutes spent exercising at this level 40 min            12/3/2024   Social Factors   Frequency of gathering with friends or relatives Once a week   Worry food won't last until get money to buy more No   Food not last or not have enough money for food? No   Do you have housing? (Housing is defined as stable permanent housing and does not include staying ouside in a car, in a tent, in an abandoned building, in an overnight shelter, or couch-surfing.) Yes   Are you worried about losing your housing? No   Lack of transportation? No   Unable to get utilities  (heat,electricity)? No            12/5/2024   Fall Risk   Gait Speed Test (Document in seconds) 3.41   Gait Speed Test Interpretation Less than or equal to 5.00 seconds - PASS               12/3/2024   Dental   Dentist two times every year? Yes            12/3/2024   TB Screening   Were you born outside of the [place]? No          Today's PHQ-2 Score:       12/3/2024     9:51 AM   PHQ-2 ( 1999 Pfizer)   Q1: Little interest or pleasure in doing things 0    Q2: Feeling down, depressed or hopeless 0    PHQ-2 Score 0    Q1: Little interest or pleasure in doing things Not at all   Q2: Feeling down, depressed or hopeless Not at all   PHQ-2 Score 0       Patient-reported           12/3/2024   Substance Use   Alcohol more than 3/day or more than 7/wk No   Do you use any other substances recreationally? No        Social History     Tobacco Use    Smoking status: Never    Smokeless tobacco: Never    Tobacco comments:     a little in college   Vaping Use    Vaping status: Never Used   Substance Use Topics    Alcohol use: Yes     Alcohol/week: 0.0 standard drinks of alcohol     Comment: a glass of wine with dinner every once in a while    Drug use: No           1/5/2024   LAST FHS-7 RESULTS   1st degree relative breast or ovarian cancer Yes   Any relative bilateral breast cancer No   Any male have breast cancer No   Any ONE woman have BOTH breast AND ovarian cancer No   Any woman with breast cancer before 50yrs No   2 or more relatives with breast AND/OR ovarian cancer Yes   2 or more relatives with breast AND/OR bowel cancer Yes         Mammogram Screening - Mammogram every 1-2 years updated in Health Maintenance based on mutual decision making        12/3/2024   STI Screening   New sexual partner(s) since last STI/HIV test? No        History of abnormal Pap smear: No - age 30- 64 PAP with HPV every 5 years recommended        Latest Ref Rng & Units 4/29/2021     1:10 PM 4/29/2021     1:01 PM 2/24/2016    12:00 AM   PAP / HPV   PAP  (Historical)   NIL  NIL    HPV 16 DNA NEG^Negative Negative      HPV 18 DNA NEG^Negative Negative      Other HR HPV NEG^Negative Negative        ASCVD Risk   The 10-year ASCVD risk score (Twila BAKER, et al., 2019) is: 3.8%    Values used to calculate the score:      Age: 64 years      Sex: Female      Is Non- : No      Diabetic: No      Tobacco smoker: No      Systolic Blood Pressure: 118 mmHg      Is BP treated: No      HDL Cholesterol: 80 mg/dL      Total Cholesterol: 264 mg/dL         Reviewed and updated as needed this visit by Provider   Tobacco  Allergies  Meds  Problems  Med Hx  Surg Hx  Fam Hx            BP Readings from Last 3 Encounters:   12/05/24 118/79   09/04/24 136/84   06/04/24 123/71    Wt Readings from Last 3 Encounters:   12/05/24 81.6 kg (180 lb)   06/04/24 78 kg (172 lb)   03/28/24 81.2 kg (179 lb)               Patient Active Problem List   Diagnosis    Insomnia    Mixed urge and stress incontinence    Generalized anxiety disorder    Classical migraine    Varicose veins of legs    Ganglion of tendon sheath    Palpitations    Osteopenia of both hips    Eagle's syndrome    Thoracic outlet syndrome    Plantar fasciitis     Past Surgical History:   Procedure Laterality Date    COLONOSCOPY  2/5/02    ZAK, Dr. Solomon    COLONOSCOPY  7/9/12    WNL, Dr. Sharma, rpt 10 yrs.    COLONOSCOPY N/A 6/4/2024    Procedure: Colonoscopy with polypectomy;  Surgeon: Zaina Haque MD;  [place] [facility] [place]PPER GI ENDOSCOPY  3/28/02    HH, gastritis, reflux esophagitis, neg bx, Dr. Solomon       Social History     Tobacco Use    Smoking status: Never    Smokeless tobacco: Never    Tobacco comments:     a little in college   Substance Use Topics    Alcohol use: Yes     Alcohol/week: 0.0 standard drinks of alcohol     Comment: a glass of wine with dinner every once in a while     Family History   Problem Relation Age of Onset    Cerebrovascular Disease Maternal Grandmother   "   Colon Cancer Maternal Grandmother     Cerebrovascular Disease Maternal Grandfather     C.A.D. Paternal Grandfather     C.A.D. Paternal Grandmother     Breast Cancer Paternal Grandmother         bilateral    Cerebrovascular Disease Mother     Other - See Comments Mother 80        churg adia syndrome    Eye Disorder Father         \"going blind\"    Coronary Artery Disease Father         CAB at 78    Leukemia Father         CLL    Breast Cancer Paternal Aunt         Inflammatory Breast Cancer    Breast Cancer Maternal Aunt         x3 aunts    Colon Cancer Maternal Uncle               Objective    Exam  /79 (BP Location: Right arm, Patient Position: Sitting, Cuff Size: Adult Large)   Pulse 61   Temp 97.7  F (36.5  C) (Temporal)   Resp 16   Ht 1.61 m (5' 3.39\")   Wt 81.6 kg (180 lb)   LMP 09/14/2011   SpO2 98%   BMI 31.50 kg/m     Estimated body mass index is 31.5 kg/m  as calculated from the following:    Height as of this encounter: 1.61 m (5' 3.39\").    Weight as of this encounter: 81.6 kg (180 lb).    Physical Exam  GENERAL: healthy, alert and no distress  EYES: Eyes grossly normal to inspection, conjunctivae and sclerae normal  HENT: ear canals and TM's normal  NECK: no adenopathy, no asymmetry, masses, or scars and thyroid normal to palpation  RESP: lungs clear to auscultation - no rales, rhonchi or wheezes  CV: regular rate and rhythm, normal S1 S2, no S3 or S4, no murmur, click or rub  ABDOMEN: soft, nontender, no hepatosplenomegaly, no masses  MS: no gross musculoskeletal defects noted, no edema  SKIN: no suspicious lesions or rashes  NEURO: Grossly normal strength and tone, mentation intact and speech normal  PSYCH: mentation appears normal, affect normal/bright    Signed Electronically by: Mel Beavers MD      "

## 2025-01-06 ENCOUNTER — ALLIED HEALTH/NURSE VISIT (OUTPATIENT)
Dept: FAMILY MEDICINE | Facility: CLINIC | Age: 65
End: 2025-01-06
Payer: COMMERCIAL

## 2025-01-06 DIAGNOSIS — Z23 ENCOUNTER FOR IMMUNIZATION: Primary | ICD-10-CM

## 2025-01-06 PROCEDURE — 90471 IMMUNIZATION ADMIN: CPT

## 2025-01-06 PROCEDURE — 90750 HZV VACC RECOMBINANT IM: CPT

## 2025-01-06 PROCEDURE — 99207 PR NO CHARGE NURSE ONLY: CPT

## 2025-01-06 NOTE — PROGRESS NOTES
Prior to immunization administration, verified patients identity using patient s name and date of birth. Please see Immunization Activity for additional information.     Is the patient's temperature normal (100.5 or less)? Yes     Patient MEETS CRITERIA. PROCEED with vaccine administration.          1/6/2025   Zoster   Have you had a serious reaction to the shingles vaccine or something in the shingles vaccine? No   Do you have shingles now? No   Are you getting treatment for cancer, organ transplant, or bone marrow transplant? No   Do you have an autoimmune or inflammatory condition? No   Is the patient immunocompromised and wanting to complete the Shingles vaccine series in less than 2 months? No   Have you had Guillain-Neavitt syndrome within 6 weeks of getting a vaccine? No         Patient MEETS CRITERIA. PROCEED with vaccine administration.      Patient instructed to remain in clinic for 15 minutes afterwards, and to report any adverse reactions.      Link to Ancillary Visit Immunization Standing Orders SmartSet     Screening performed by Sangeetha Mendoza MA on 1/6/2025 at 11:37 AM.

## 2025-01-10 ENCOUNTER — ANCILLARY PROCEDURE (OUTPATIENT)
Dept: MAMMOGRAPHY | Facility: CLINIC | Age: 65
End: 2025-01-10
Attending: FAMILY MEDICINE
Payer: COMMERCIAL

## 2025-01-10 DIAGNOSIS — Z12.31 VISIT FOR SCREENING MAMMOGRAM: ICD-10-CM

## 2025-01-10 PROCEDURE — 77067 SCR MAMMO BI INCL CAD: CPT | Performed by: RADIOLOGY

## 2025-01-10 PROCEDURE — 77063 BREAST TOMOSYNTHESIS BI: CPT | Performed by: RADIOLOGY

## 2025-01-16 ENCOUNTER — TRANSFERRED RECORDS (OUTPATIENT)
Dept: HEALTH INFORMATION MANAGEMENT | Facility: CLINIC | Age: 65
End: 2025-01-16
Payer: COMMERCIAL

## 2025-02-18 NOTE — PROGRESS NOTES
The patient is being evaluated via a billable video visit.    How would you like to obtain your AVS? MyChart  If the video visit is dropped, the invitation should be resent by: Send to e-mail at: [email or web address]  Will anyone else be joining your video visit? No      Video-Visit Details  Type of service:  Video Visit  Video Start Time: 2:02 PM  Video End Time: 2:42 PM  Originating Location (pt. Location): Home  Distant Location (provider location):  [facility] NEUROLOGY [facility]   Platform used for Video Visit: [facility]    Follow-up 6-1-22:  Gisela Pak is a 62 year old female with a past medical history of severe head pressure worse being reclined, myofascial pain, and imbalance with veering to the left. The balance issues have been present since Feb 2021. Headaches have been present since her 20's. She had a normal exam on 6-1-22 except for tenderness over the anterior scalenes and sternoclavicular junction, worse on the right side.   Head pressure that wakes her up in the morning and is better with being upright was worrisome for raised intracranial pressure. Given that she has right sided predominant thoracic outlet tenderness, we worked her up for venous compression in the neck and upper shoulder. Non-contrast brain MRI was previously normal.      Interim History 7-29-22:  Ultrasound 6-9-22:  1. RIGHT Subclavian: A. 8.79 velocity ratio from the lateral to mid subclavian vein suggests stenosis.     CTV Head and Neck 6-23-22:  In neutral position, the upper internal jugular veins demonstrate mild narrowing narrowing bilaterally. With neck flexion, there is moderate narrowing of the upper left internal jugular vein. Near complete effacement of the right internal jugular vein in flexion between the digastric and C2 transverse process.     Patient was better in April and May but symptoms have been worse in the last couple of months. Symptoms are worse with weather changes, mostly related  with humidity and barometric pressures. She was just in [place] and she was almost asymptomatic. The weather was very even. Going up elevation to 3000 feet, she became nauseated but it didn't make the headache worse. The weather changes in general can cause head pressure. She does not have a problem with flying. Symptoms can be triggered by forward head flexion.      Pathophysiology reviewed with both internal jugular veins narrowed with forward head flexion and some evidence of right sided subclavian vein narrowing. We discussed the need for ergonomic awareness and the role of physical therapy.      Plan:  1. 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 I've instructed her on how to look for a provider in her area.   2. Physical therapy for the subclavian vein compression. Therapy requested.   3. Follow-up 4 months    INTERIM HISTORY 1/13/2023:  8-31-22 One session of PT:   1.  Hypertonicity bilateral scalenes  2.  Decreased cervical spine ROM     [facility], through [facility], Dr. Fletcher since late October. This has been beneficial for having less shoulder tightness, does not need daily shoulder massages. She had had a 5-week break and the shoulder tightness return.    Headaches are uncommon, not affected by the [facility].   Her balance problems are not changed.  She finds that the weather changes make her more imbalanced. The trigger seems to be changing barometric pressure and humidity. She was perfectly fine when she was in [place].     Plan:  1. Should try PT, will refer to area close to her home  2. Care for neck protection especially with travel.   3. Follow-up 4-6 months    DATA:  I personally reviewed the following data.    Last brain imaging:  CTV Head w Contrast  Narrative: EXAM: CTV HEAD NECK W CONTRAST 6/23/2022 10:28 AM   Reconstruction by the Radiologist on 3D workstation    History:  62F with severe head pressure, imbalance,  questions venous  stenosis, styloid length.; Dysequilibrium; Compression of vein; TOS  (thoracic outlet syndrome); Muscle spasm; Pressure in head    Comparison: 4/27/2021 brain MR, 6/9/2022 ultrasound    Technique: Post intravenous contrast imaging was obtained of the head  and neck with thin sections from the vertex through the lung apices  with a delay for venogram purposes. Patient was imaged in the neutral  position(s). Images were reviewed on the 3D workstation and  manipulated.    Contrast Dose: Isovue 370 68cc    Findings:    HEAD:  Head CTV demonstrates patent major dural and deep intracranial venous  sinuses with no evidence of thrombosis.    NECK  Patent major cervical veins with no evidence of thrombosis.     The styloid process  on the RIGHT measures 2.3 cm.     The styloid process on the LEFT measures 2.3 cm.    NEUTRAL POSITION:    The RIGHT upper internal jugular vein, anterior to the C1 transverse  process, demonstrates mild narrowing.     The LEFT upper internal jugular vein, anterior to the C1 transverse  process, demonstrates mild narrowing.     The mid and lower internal jugular veins are unremarkable.    FLEXED POSITION:  The RIGHT upper internal jugular vein, anterior to the C1 transverse  process, demonstrates moderate narrowing.     The LEFT upper internal jugular vein, anterior to the C1 transverse  process, demonstrates moderate narrowing.     Near complete effacement of the cervical segment of the right internal  jugular as it intersperses between the posterior belly of the  digastric and the transverse process of C2. The remaining mid and  lower internal jugular veins are unremarkable.    The visualized lung apices appear unremarkable.   Impression: Impression:    1.  CTV of the head reveals patent major intracranial veins and dural  venous sinuses with no evidence of thrombus.  2.  CTV of the neck reveals patent major cervical veins with no  evidence of thrombus.   3. In neutral  position, the upper internal jugular veins demonstrate  mild narrowing narrowing bilaterally. With neck flexion, there is  moderate narrowing of the upper left internal jugular vein.   4. Near complete effacement of the right internal jugular vein in  flexion between the digastric and C2 transverse process. Findings are  of indeterminate clinical significance.    I have personally reviewed the examination and initial interpretation  and I agree with the findings.    THERESA SAMUEL MD         SYSTEM ID:  [id number]    42-minutes were spent in evaluation and counseling as well as documentation on the date of service. After a review of the patient s situation, this visit was changed from an in-person visit to a video visit to reduce the risk of COVID-19 exposure.       Dr. Louise

## 2025-05-19 ENCOUNTER — ALLIED HEALTH/NURSE VISIT (OUTPATIENT)
Dept: FAMILY MEDICINE | Facility: CLINIC | Age: 65
End: 2025-05-19
Payer: COMMERCIAL

## 2025-05-19 VITALS — DIASTOLIC BLOOD PRESSURE: 78 MMHG | SYSTOLIC BLOOD PRESSURE: 128 MMHG | OXYGEN SATURATION: 98 % | HEART RATE: 66 BPM

## 2025-05-19 DIAGNOSIS — Z01.30 BP CHECK: Primary | ICD-10-CM

## 2025-05-19 PROCEDURE — 99207 PR NO CHARGE NURSE ONLY: CPT

## 2025-05-19 PROCEDURE — 3078F DIAST BP <80 MM HG: CPT

## 2025-05-19 PROCEDURE — 3074F SYST BP LT 130 MM HG: CPT

## 2025-05-19 NOTE — NURSING NOTE
Gisela Pak is a 65 year old patient who comes in today for a Blood Pressure check.  Initial BP:  /78 (BP Location: Right arm, Patient Position: Sitting, Cuff Size: Adult Large)   Pulse 66   LMP 09/14/2011   SpO2 98%      66  Disposition: results routed to provider

## 2025-06-09 DIAGNOSIS — G43.009 MIGRAINE WITHOUT AURA AND WITHOUT STATUS MIGRAINOSUS, NOT INTRACTABLE: ICD-10-CM

## 2025-06-09 DIAGNOSIS — R25.1 TREMOR: ICD-10-CM

## 2025-06-09 RX ORDER — PROPRANOLOL HYDROCHLORIDE 60 MG/1
60 CAPSULE, EXTENDED RELEASE ORAL AT BEDTIME
Qty: 30 CAPSULE | Refills: 3 | Status: SHIPPED | OUTPATIENT
Start: 2025-06-09

## 2025-06-09 NOTE — TELEPHONE ENCOUNTER
Neuroscience Clinic Task Note    TASK    Neurology Rx Refill  Medication propranolol ER (INDERAL LA) 60 MG 24 hr capsule    Dose Take 1 capsule (60 mg) by mouth at bedtime.    Last refill ordered (m/d/y) 01/31/2025   Last quantity ordered 30   Last # refills 3   Last clinic visit with ordering provider (m/d/y) 09/04/2024   Next clinic visit with ordering provider (m/d/y) 08/08/2025   All pertinent protocol data (lab date/result)    Pertinent information from patient's message        FOLLOW-UP      ADDITIONAL COMMENTS      Kae Leija

## 2025-08-04 ENCOUNTER — VIRTUAL VISIT (OUTPATIENT)
Dept: FAMILY MEDICINE | Facility: CLINIC | Age: 65
End: 2025-08-04
Payer: COMMERCIAL

## 2025-08-04 ENCOUNTER — LAB (OUTPATIENT)
Dept: LAB | Facility: CLINIC | Age: 65
End: 2025-08-04
Payer: COMMERCIAL

## 2025-08-04 DIAGNOSIS — R22.41 LOCALIZED SWELLING OF RIGHT FOOT: ICD-10-CM

## 2025-08-04 DIAGNOSIS — R20.0 NUMBNESS OF RIGHT FOOT: ICD-10-CM

## 2025-08-04 DIAGNOSIS — M79.661 RIGHT CALF PAIN: ICD-10-CM

## 2025-08-04 DIAGNOSIS — R20.0 NUMBNESS OF RIGHT FOOT: Primary | ICD-10-CM

## 2025-08-04 LAB
D DIMER PPP FEU-MCNC: <0.27 UG/ML FEU (ref 0–0.5)
VIT B12 SERPL-MCNC: 584 PG/ML (ref 232–1245)

## 2025-08-04 PROCEDURE — 99000 SPECIMEN HANDLING OFFICE-LAB: CPT

## 2025-08-04 PROCEDURE — 82607 VITAMIN B-12: CPT

## 2025-08-04 PROCEDURE — 84425 ASSAY OF VITAMIN B-1: CPT | Mod: 90

## 2025-08-04 PROCEDURE — 98005 SYNCH AUDIO-VIDEO EST LOW 20: CPT | Performed by: FAMILY MEDICINE

## 2025-08-04 PROCEDURE — 84207 ASSAY OF VITAMIN B-6: CPT | Mod: 90

## 2025-08-04 PROCEDURE — 85379 FIBRIN DEGRADATION QUANT: CPT

## 2025-08-04 PROCEDURE — 36415 COLL VENOUS BLD VENIPUNCTURE: CPT

## 2025-08-04 ASSESSMENT — ANXIETY QUESTIONNAIRES
6. BECOMING EASILY ANNOYED OR IRRITABLE: NOT AT ALL
1. FEELING NERVOUS, ANXIOUS, OR ON EDGE: NOT AT ALL
3. WORRYING TOO MUCH ABOUT DIFFERENT THINGS: NOT AT ALL
IF YOU CHECKED OFF ANY PROBLEMS ON THIS QUESTIONNAIRE, HOW DIFFICULT HAVE THESE PROBLEMS MADE IT FOR YOU TO DO YOUR WORK, TAKE CARE OF THINGS AT HOME, OR GET ALONG WITH OTHER PEOPLE: NOT DIFFICULT AT ALL
7. FEELING AFRAID AS IF SOMETHING AWFUL MIGHT HAPPEN: NOT AT ALL
GAD7 TOTAL SCORE: 0
8. IF YOU CHECKED OFF ANY PROBLEMS, HOW DIFFICULT HAVE THESE MADE IT FOR YOU TO DO YOUR WORK, TAKE CARE OF THINGS AT HOME, OR GET ALONG WITH OTHER PEOPLE?: NOT DIFFICULT AT ALL
GAD7 TOTAL SCORE: 0
5. BEING SO RESTLESS THAT IT IS HARD TO SIT STILL: NOT AT ALL
GAD7 TOTAL SCORE: 0
7. FEELING AFRAID AS IF SOMETHING AWFUL MIGHT HAPPEN: NOT AT ALL
2. NOT BEING ABLE TO STOP OR CONTROL WORRYING: NOT AT ALL
4. TROUBLE RELAXING: NOT AT ALL

## 2025-08-06 LAB — VIT B1 PYROPHOSHATE BLD-SCNC: 126 NMOL/L

## 2025-08-07 LAB — PYRIDOXAL PHOS SERPL-SCNC: 47.2 NMOL/L

## (undated) DEVICE — GOWN IMPERVIOUS 2XL BLUE

## (undated) DEVICE — ENDO SNARE EXACTO COLD 9MM LOOP 2.4MMX230CM 00711115

## (undated) DEVICE — KIT ENDO TURNOVER/PROCEDURE CARRY-ON 101822

## (undated) DEVICE — SPECIMEN CONTAINER 3OZ W/FORMALIN 59901

## (undated) DEVICE — TUBING SUCTION MEDI-VAC 1/4"X20' N620A

## (undated) DEVICE — SOL WATER IRRIG 500ML BOTTLE 2F7113

## (undated) DEVICE — SUCTION MANIFOLD NEPTUNE 2 SYS 1 PORT 702-025-000

## (undated) RX ORDER — FENTANYL CITRATE 50 UG/ML
INJECTION, SOLUTION INTRAMUSCULAR; INTRAVENOUS
Status: DISPENSED
Start: 2024-06-04

## (undated) RX ORDER — DIPHENHYDRAMINE HYDROCHLORIDE 50 MG/ML
INJECTION INTRAMUSCULAR; INTRAVENOUS
Status: DISPENSED
Start: 2024-06-04

## (undated) RX ORDER — ONDANSETRON 2 MG/ML
INJECTION INTRAMUSCULAR; INTRAVENOUS
Status: DISPENSED
Start: 2024-06-04